# Patient Record
Sex: FEMALE | Race: WHITE | Employment: OTHER | URBAN - METROPOLITAN AREA
[De-identification: names, ages, dates, MRNs, and addresses within clinical notes are randomized per-mention and may not be internally consistent; named-entity substitution may affect disease eponyms.]

---

## 2017-01-01 ENCOUNTER — HOSPITAL ENCOUNTER (INPATIENT)
Age: 82
LOS: 50 days | End: 2017-02-21
Attending: INTERNAL MEDICINE | Admitting: INTERNAL MEDICINE

## 2017-01-01 VITALS
WEIGHT: 125.66 LBS | HEIGHT: 65 IN | DIASTOLIC BLOOD PRESSURE: 46 MMHG | RESPIRATION RATE: 12 BRPM | TEMPERATURE: 96.6 F | HEART RATE: 80 BPM | SYSTOLIC BLOOD PRESSURE: 90 MMHG | BODY MASS INDEX: 20.94 KG/M2

## 2017-01-01 DIAGNOSIS — M79.2 NEURALGIA AND NEURITIS: ICD-10-CM

## 2017-01-01 DIAGNOSIS — M48.061 SPINAL STENOSIS OF LUMBAR REGION: ICD-10-CM

## 2017-01-01 RX ORDER — MORPHINE SULFATE 100 MG/5ML
5 SOLUTION ORAL
Status: DISCONTINUED | OUTPATIENT
Start: 2017-01-01 | End: 2017-01-01

## 2017-01-01 RX ORDER — ACETAMINOPHEN 325 MG/1
650 TABLET ORAL
Status: DISCONTINUED | OUTPATIENT
Start: 2017-01-01 | End: 2017-01-01

## 2017-01-01 RX ORDER — CYCLOBENZAPRINE HCL 5 MG
5 TABLET ORAL
COMMUNITY

## 2017-01-01 RX ORDER — FENTANYL 100 UG/H
2 PATCH TRANSDERMAL
Status: DISCONTINUED | OUTPATIENT
Start: 2017-01-01 | End: 2017-01-01

## 2017-01-01 RX ORDER — LORAZEPAM 1 MG/1
1 TABLET ORAL
Status: DISCONTINUED | OUTPATIENT
Start: 2017-01-01 | End: 2017-01-01

## 2017-01-01 RX ORDER — DIPHENHYDRAMINE HCL 25 MG
25 CAPSULE ORAL
Status: DISCONTINUED | OUTPATIENT
Start: 2017-01-01 | End: 2017-01-01

## 2017-01-01 RX ORDER — FACIAL-BODY WIPES
10 EACH TOPICAL AS NEEDED
Status: DISCONTINUED | OUTPATIENT
Start: 2017-01-01 | End: 2017-01-01 | Stop reason: HOSPADM

## 2017-01-01 RX ORDER — SENNOSIDES 8.6 MG/1
1 TABLET ORAL 2 TIMES DAILY
Status: DISCONTINUED | OUTPATIENT
Start: 2017-01-01 | End: 2017-01-01

## 2017-01-01 RX ORDER — DIPHENHYDRAMINE HYDROCHLORIDE 50 MG/ML
25 INJECTION, SOLUTION INTRAMUSCULAR; INTRAVENOUS
Status: DISCONTINUED | OUTPATIENT
Start: 2017-01-01 | End: 2017-01-01 | Stop reason: HOSPADM

## 2017-01-01 RX ORDER — ACETAMINOPHEN 650 MG/1
650 SUPPOSITORY RECTAL
Status: DISCONTINUED | OUTPATIENT
Start: 2017-01-01 | End: 2017-01-01 | Stop reason: HOSPADM

## 2017-01-01 RX ORDER — TRAMADOL HYDROCHLORIDE 50 MG/1
50 TABLET ORAL
COMMUNITY

## 2017-01-01 RX ORDER — FENTANYL 25 UG/1
1 PATCH TRANSDERMAL
Status: DISCONTINUED | OUTPATIENT
Start: 2017-01-01 | End: 2017-01-01

## 2017-01-01 RX ORDER — FENTANYL 100 UG/H
2 PATCH TRANSDERMAL
Status: DISCONTINUED | OUTPATIENT
Start: 2017-01-01 | End: 2017-01-01 | Stop reason: HOSPADM

## 2017-01-01 RX ORDER — LORAZEPAM 2 MG/ML
1 INJECTION INTRAMUSCULAR
Status: DISCONTINUED | OUTPATIENT
Start: 2017-01-01 | End: 2017-01-01

## 2017-01-01 RX ORDER — IPRATROPIUM BROMIDE AND ALBUTEROL SULFATE 2.5; .5 MG/3ML; MG/3ML
3 SOLUTION RESPIRATORY (INHALATION)
Status: DISCONTINUED | OUTPATIENT
Start: 2017-01-01 | End: 2017-01-01 | Stop reason: HOSPADM

## 2017-01-01 RX ORDER — MORPHINE SULFATE 2 MG/ML
2 INJECTION, SOLUTION INTRAMUSCULAR; INTRAVENOUS
Status: DISCONTINUED | OUTPATIENT
Start: 2017-01-01 | End: 2017-01-01

## 2017-01-01 RX ORDER — HALOPERIDOL 1 MG/1
2 TABLET ORAL
Status: DISCONTINUED | OUTPATIENT
Start: 2017-01-01 | End: 2017-01-01

## 2017-01-01 RX ORDER — AMLODIPINE BESYLATE 5 MG/1
5 TABLET ORAL DAILY
COMMUNITY

## 2017-01-01 RX ORDER — FENTANYL 12.5 UG/1
1 PATCH TRANSDERMAL
Status: DISCONTINUED | OUTPATIENT
Start: 2017-01-01 | End: 2017-01-01

## 2017-01-01 RX ORDER — HALOPERIDOL 5 MG/ML
2 INJECTION INTRAMUSCULAR
Status: DISCONTINUED | OUTPATIENT
Start: 2017-01-01 | End: 2017-01-01 | Stop reason: HOSPADM

## 2017-01-01 RX ORDER — GLYCOPYRROLATE 0.2 MG/ML
0.2 INJECTION INTRAMUSCULAR; INTRAVENOUS
Status: DISCONTINUED | OUTPATIENT
Start: 2017-01-01 | End: 2017-01-01 | Stop reason: HOSPADM

## 2017-01-01 RX ORDER — MORPHINE SULFATE 10 MG/ML
5 INJECTION, SOLUTION INTRAMUSCULAR; INTRAVENOUS
Status: DISCONTINUED | OUTPATIENT
Start: 2017-01-01 | End: 2017-01-01

## 2017-01-01 RX ORDER — ALBUTEROL SULFATE 0.83 MG/ML
2.5 SOLUTION RESPIRATORY (INHALATION)
COMMUNITY

## 2017-01-01 RX ORDER — FENTANYL 75 UG/H
1 PATCH TRANSDERMAL
Status: DISCONTINUED | OUTPATIENT
Start: 2017-01-01 | End: 2017-01-01

## 2017-01-01 RX ORDER — FENTANYL 50 UG/1
1 PATCH TRANSDERMAL
Status: DISCONTINUED | OUTPATIENT
Start: 2017-01-01 | End: 2017-01-01

## 2017-01-01 RX ORDER — SENNOSIDES 8.6 MG/1
2 TABLET ORAL
COMMUNITY

## 2017-01-01 RX ORDER — MORPHINE SULFATE 4 MG/ML
INJECTION, SOLUTION INTRAMUSCULAR; INTRAVENOUS
Status: COMPLETED
Start: 2017-01-01 | End: 2017-01-01

## 2017-01-01 RX ORDER — MORPHINE SULFATE 4 MG/ML
4 INJECTION, SOLUTION INTRAMUSCULAR; INTRAVENOUS
Status: DISCONTINUED | OUTPATIENT
Start: 2017-01-01 | End: 2017-01-01

## 2017-01-01 RX ORDER — FENTANYL 100 UG/H
1 PATCH TRANSDERMAL
Status: DISCONTINUED | OUTPATIENT
Start: 2017-01-01 | End: 2017-01-01

## 2017-01-01 RX ORDER — BENZONATATE 100 MG/1
100 CAPSULE ORAL
COMMUNITY

## 2017-01-01 RX ORDER — FENTANYL 50 UG/1
1 PATCH TRANSDERMAL
Status: DISCONTINUED | OUTPATIENT
Start: 2017-01-01 | End: 2017-01-01 | Stop reason: HOSPADM

## 2017-01-01 RX ORDER — LORAZEPAM 2 MG/ML
1 INJECTION INTRAMUSCULAR
Status: DISCONTINUED | OUTPATIENT
Start: 2017-01-01 | End: 2017-01-01 | Stop reason: HOSPADM

## 2017-01-01 RX ORDER — MORPHINE SULFATE 4 MG/ML
8 INJECTION, SOLUTION INTRAMUSCULAR; INTRAVENOUS
Status: DISCONTINUED | OUTPATIENT
Start: 2017-01-01 | End: 2017-01-01 | Stop reason: HOSPADM

## 2017-01-01 RX ORDER — LORAZEPAM 1 MG/1
1 TABLET ORAL
Status: DISCONTINUED | OUTPATIENT
Start: 2017-01-01 | End: 2017-01-01 | Stop reason: HOSPADM

## 2017-01-01 RX ADMIN — HALOPERIDOL LACTATE 2 MG: 5 INJECTION, SOLUTION INTRAMUSCULAR at 22:53

## 2017-01-01 RX ADMIN — MORPHINE SULFATE 4 MG: 4 INJECTION, SOLUTION INTRAMUSCULAR; INTRAVENOUS at 07:50

## 2017-01-01 RX ADMIN — MORPHINE SULFATE 4 MG: 4 INJECTION, SOLUTION INTRAMUSCULAR; INTRAVENOUS at 22:28

## 2017-01-01 RX ADMIN — MORPHINE SULFATE 5 MG: 100 SOLUTION ORAL at 05:00

## 2017-01-01 RX ADMIN — HALOPERIDOL LACTATE 2 MG: 5 INJECTION, SOLUTION INTRAMUSCULAR at 18:28

## 2017-01-01 RX ADMIN — HALOPERIDOL LACTATE 2 MG: 5 INJECTION, SOLUTION INTRAMUSCULAR at 22:39

## 2017-01-01 RX ADMIN — MORPHINE SULFATE 4 MG: 4 INJECTION, SOLUTION INTRAMUSCULAR; INTRAVENOUS at 01:08

## 2017-01-01 RX ADMIN — HALOPERIDOL LACTATE 2 MG: 5 INJECTION, SOLUTION INTRAMUSCULAR at 17:43

## 2017-01-01 RX ADMIN — LORAZEPAM 1 MG: 2 INJECTION INTRAMUSCULAR; INTRAVENOUS at 03:06

## 2017-01-01 RX ADMIN — MORPHINE SULFATE 8 MG: 4 INJECTION, SOLUTION INTRAMUSCULAR; INTRAVENOUS at 20:56

## 2017-01-01 RX ADMIN — HALOPERIDOL LACTATE 2 MG: 5 INJECTION, SOLUTION INTRAMUSCULAR at 14:03

## 2017-01-01 RX ADMIN — MORPHINE SULFATE 2 MG: 2 INJECTION, SOLUTION INTRAMUSCULAR; INTRAVENOUS at 04:32

## 2017-01-01 RX ADMIN — MORPHINE SULFATE 4 MG: 4 INJECTION, SOLUTION INTRAMUSCULAR; INTRAVENOUS at 09:38

## 2017-01-01 RX ADMIN — MORPHINE SULFATE 4 MG: 4 INJECTION, SOLUTION INTRAMUSCULAR; INTRAVENOUS at 21:42

## 2017-01-01 RX ADMIN — MORPHINE SULFATE 2 MG: 2 INJECTION, SOLUTION INTRAMUSCULAR; INTRAVENOUS at 10:44

## 2017-01-01 RX ADMIN — MORPHINE SULFATE 4 MG: 4 INJECTION, SOLUTION INTRAMUSCULAR; INTRAVENOUS at 16:45

## 2017-01-01 RX ADMIN — MORPHINE SULFATE 8 MG: 4 INJECTION, SOLUTION INTRAMUSCULAR; INTRAVENOUS at 12:54

## 2017-01-01 RX ADMIN — MORPHINE SULFATE 8 MG: 4 INJECTION, SOLUTION INTRAMUSCULAR; INTRAVENOUS at 08:37

## 2017-01-01 RX ADMIN — MORPHINE SULFATE 4 MG: 4 INJECTION, SOLUTION INTRAMUSCULAR; INTRAVENOUS at 15:16

## 2017-01-01 RX ADMIN — MORPHINE SULFATE 4 MG: 4 INJECTION, SOLUTION INTRAMUSCULAR; INTRAVENOUS at 08:51

## 2017-01-01 RX ADMIN — MORPHINE SULFATE 8 MG: 4 INJECTION, SOLUTION INTRAMUSCULAR; INTRAVENOUS at 17:32

## 2017-01-01 RX ADMIN — MORPHINE SULFATE 4 MG: 4 INJECTION, SOLUTION INTRAMUSCULAR; INTRAVENOUS at 22:25

## 2017-01-01 RX ADMIN — MORPHINE SULFATE 2 MG: 2 INJECTION, SOLUTION INTRAMUSCULAR; INTRAVENOUS at 14:03

## 2017-01-01 RX ADMIN — MORPHINE SULFATE 2 MG: 2 INJECTION, SOLUTION INTRAMUSCULAR; INTRAVENOUS at 03:31

## 2017-01-01 RX ADMIN — LORAZEPAM 1 MG: 1 TABLET ORAL at 22:28

## 2017-01-01 RX ADMIN — HALOPERIDOL LACTATE 2 MG: 5 INJECTION, SOLUTION INTRAMUSCULAR at 10:41

## 2017-01-01 RX ADMIN — HALOPERIDOL LACTATE 2 MG: 5 INJECTION, SOLUTION INTRAMUSCULAR at 14:23

## 2017-01-01 RX ADMIN — MORPHINE SULFATE 4 MG: 4 INJECTION, SOLUTION INTRAMUSCULAR; INTRAVENOUS at 19:14

## 2017-01-01 RX ADMIN — MORPHINE SULFATE 4 MG: 4 INJECTION, SOLUTION INTRAMUSCULAR; INTRAVENOUS at 10:04

## 2017-01-01 RX ADMIN — HALOPERIDOL LACTATE 2 MG: 5 INJECTION, SOLUTION INTRAMUSCULAR at 06:58

## 2017-01-01 RX ADMIN — MORPHINE SULFATE 8 MG: 4 INJECTION, SOLUTION INTRAMUSCULAR; INTRAVENOUS at 09:53

## 2017-01-01 RX ADMIN — MORPHINE SULFATE 2 MG: 2 INJECTION, SOLUTION INTRAMUSCULAR; INTRAVENOUS at 08:09

## 2017-01-01 RX ADMIN — LORAZEPAM 1 MG: 2 INJECTION INTRAMUSCULAR; INTRAVENOUS at 23:27

## 2017-01-01 RX ADMIN — MORPHINE SULFATE 8 MG: 4 INJECTION, SOLUTION INTRAMUSCULAR; INTRAVENOUS at 21:09

## 2017-01-01 RX ADMIN — HALOPERIDOL LACTATE 2 MG: 5 INJECTION, SOLUTION INTRAMUSCULAR at 11:42

## 2017-01-01 RX ADMIN — HALOPERIDOL LACTATE 2 MG: 5 INJECTION, SOLUTION INTRAMUSCULAR at 14:16

## 2017-01-01 RX ADMIN — MORPHINE SULFATE 2 MG: 2 INJECTION, SOLUTION INTRAMUSCULAR; INTRAVENOUS at 05:06

## 2017-01-01 RX ADMIN — MORPHINE SULFATE 8 MG: 4 INJECTION, SOLUTION INTRAMUSCULAR; INTRAVENOUS at 18:15

## 2017-01-01 RX ADMIN — MORPHINE SULFATE 8 MG: 4 INJECTION, SOLUTION INTRAMUSCULAR; INTRAVENOUS at 16:34

## 2017-01-01 RX ADMIN — LORAZEPAM 1 MG: 2 INJECTION INTRAMUSCULAR; INTRAVENOUS at 00:33

## 2017-01-01 RX ADMIN — MORPHINE SULFATE 4 MG: 4 INJECTION, SOLUTION INTRAMUSCULAR; INTRAVENOUS at 18:08

## 2017-01-01 RX ADMIN — MORPHINE SULFATE 2 MG: 2 INJECTION, SOLUTION INTRAMUSCULAR; INTRAVENOUS at 11:35

## 2017-01-01 RX ADMIN — MORPHINE SULFATE 2 MG: 2 INJECTION, SOLUTION INTRAMUSCULAR; INTRAVENOUS at 17:03

## 2017-01-01 RX ADMIN — MORPHINE SULFATE 4 MG: 4 INJECTION, SOLUTION INTRAMUSCULAR; INTRAVENOUS at 04:38

## 2017-01-01 RX ADMIN — HALOPERIDOL LACTATE 2 MG: 5 INJECTION, SOLUTION INTRAMUSCULAR at 00:25

## 2017-01-01 RX ADMIN — MORPHINE SULFATE 4 MG: 4 INJECTION, SOLUTION INTRAMUSCULAR; INTRAVENOUS at 08:24

## 2017-01-01 RX ADMIN — MORPHINE SULFATE 4 MG: 4 INJECTION, SOLUTION INTRAMUSCULAR; INTRAVENOUS at 12:19

## 2017-01-01 RX ADMIN — MORPHINE SULFATE 2 MG: 2 INJECTION, SOLUTION INTRAMUSCULAR; INTRAVENOUS at 20:01

## 2017-01-01 RX ADMIN — LORAZEPAM 1 MG: 2 INJECTION INTRAMUSCULAR; INTRAVENOUS at 00:12

## 2017-01-01 RX ADMIN — HALOPERIDOL LACTATE 2 MG: 5 INJECTION, SOLUTION INTRAMUSCULAR at 11:48

## 2017-01-01 RX ADMIN — MORPHINE SULFATE 8 MG: 4 INJECTION, SOLUTION INTRAMUSCULAR; INTRAVENOUS at 16:02

## 2017-01-01 RX ADMIN — MORPHINE SULFATE 4 MG: 4 INJECTION, SOLUTION INTRAMUSCULAR; INTRAVENOUS at 15:25

## 2017-01-01 RX ADMIN — LORAZEPAM 1 MG: 1 TABLET ORAL at 06:31

## 2017-01-01 RX ADMIN — MORPHINE SULFATE 4 MG: 4 INJECTION, SOLUTION INTRAMUSCULAR; INTRAVENOUS at 20:58

## 2017-01-01 RX ADMIN — MORPHINE SULFATE 2 MG: 2 INJECTION, SOLUTION INTRAMUSCULAR; INTRAVENOUS at 05:01

## 2017-01-01 RX ADMIN — BISACODYL 10 MG: 10 SUPPOSITORY RECTAL at 10:05

## 2017-01-01 RX ADMIN — HALOPERIDOL LACTATE 2 MG: 5 INJECTION, SOLUTION INTRAMUSCULAR at 08:10

## 2017-01-01 RX ADMIN — HALOPERIDOL LACTATE 2 MG: 5 INJECTION, SOLUTION INTRAMUSCULAR at 17:54

## 2017-01-01 RX ADMIN — HALOPERIDOL LACTATE 2 MG: 5 INJECTION, SOLUTION INTRAMUSCULAR at 09:37

## 2017-01-01 RX ADMIN — MORPHINE SULFATE 4 MG: 4 INJECTION, SOLUTION INTRAMUSCULAR; INTRAVENOUS at 16:31

## 2017-01-01 RX ADMIN — MORPHINE SULFATE 4 MG: 4 INJECTION, SOLUTION INTRAMUSCULAR; INTRAVENOUS at 11:24

## 2017-01-01 RX ADMIN — MORPHINE SULFATE 4 MG: 4 INJECTION, SOLUTION INTRAMUSCULAR; INTRAVENOUS at 14:13

## 2017-01-01 RX ADMIN — MORPHINE SULFATE 4 MG: 4 INJECTION, SOLUTION INTRAMUSCULAR; INTRAVENOUS at 09:22

## 2017-01-01 RX ADMIN — MORPHINE SULFATE 4 MG: 4 INJECTION, SOLUTION INTRAMUSCULAR; INTRAVENOUS at 22:17

## 2017-01-01 RX ADMIN — MORPHINE SULFATE 8 MG: 4 INJECTION, SOLUTION INTRAMUSCULAR; INTRAVENOUS at 16:10

## 2017-01-01 RX ADMIN — HALOPERIDOL LACTATE 2 MG: 5 INJECTION, SOLUTION INTRAMUSCULAR at 21:42

## 2017-01-01 RX ADMIN — HALOPERIDOL LACTATE 2 MG: 5 INJECTION, SOLUTION INTRAMUSCULAR at 19:41

## 2017-01-01 RX ADMIN — MORPHINE SULFATE 4 MG: 4 INJECTION, SOLUTION INTRAMUSCULAR; INTRAVENOUS at 06:17

## 2017-01-01 RX ADMIN — HALOPERIDOL LACTATE 2 MG: 5 INJECTION, SOLUTION INTRAMUSCULAR at 09:17

## 2017-01-01 RX ADMIN — MORPHINE SULFATE 2 MG: 2 INJECTION, SOLUTION INTRAMUSCULAR; INTRAVENOUS at 19:25

## 2017-01-01 RX ADMIN — MORPHINE SULFATE 2 MG: 2 INJECTION, SOLUTION INTRAMUSCULAR; INTRAVENOUS at 08:51

## 2017-01-01 RX ADMIN — HALOPERIDOL LACTATE 2 MG: 5 INJECTION, SOLUTION INTRAMUSCULAR at 22:09

## 2017-01-01 RX ADMIN — MORPHINE SULFATE 2 MG: 2 INJECTION, SOLUTION INTRAMUSCULAR; INTRAVENOUS at 11:45

## 2017-01-01 RX ADMIN — MORPHINE SULFATE 4 MG: 4 INJECTION, SOLUTION INTRAMUSCULAR; INTRAVENOUS at 17:13

## 2017-01-01 RX ADMIN — MORPHINE SULFATE 2 MG: 2 INJECTION, SOLUTION INTRAMUSCULAR; INTRAVENOUS at 22:15

## 2017-01-01 RX ADMIN — MORPHINE SULFATE 4 MG: 4 INJECTION, SOLUTION INTRAMUSCULAR; INTRAVENOUS at 07:17

## 2017-01-01 RX ADMIN — MORPHINE SULFATE 8 MG: 4 INJECTION, SOLUTION INTRAMUSCULAR; INTRAVENOUS at 19:40

## 2017-01-01 RX ADMIN — MORPHINE SULFATE 4 MG: 4 INJECTION, SOLUTION INTRAMUSCULAR; INTRAVENOUS at 16:42

## 2017-01-01 RX ADMIN — MORPHINE SULFATE 4 MG: 4 INJECTION, SOLUTION INTRAMUSCULAR; INTRAVENOUS at 17:37

## 2017-01-01 RX ADMIN — MORPHINE SULFATE 2 MG: 2 INJECTION, SOLUTION INTRAMUSCULAR; INTRAVENOUS at 12:02

## 2017-01-01 RX ADMIN — LORAZEPAM 1 MG: 1 TABLET ORAL at 08:47

## 2017-01-01 RX ADMIN — MORPHINE SULFATE 4 MG: 4 INJECTION, SOLUTION INTRAMUSCULAR; INTRAVENOUS at 17:54

## 2017-01-01 RX ADMIN — HALOPERIDOL LACTATE 2 MG: 5 INJECTION, SOLUTION INTRAMUSCULAR at 08:24

## 2017-01-01 RX ADMIN — MORPHINE SULFATE 8 MG: 4 INJECTION, SOLUTION INTRAMUSCULAR; INTRAVENOUS at 06:07

## 2017-01-01 RX ADMIN — MORPHINE SULFATE 8 MG: 4 INJECTION, SOLUTION INTRAMUSCULAR; INTRAVENOUS at 07:32

## 2017-01-01 RX ADMIN — HALOPERIDOL LACTATE 2 MG: 5 INJECTION, SOLUTION INTRAMUSCULAR at 01:44

## 2017-01-01 RX ADMIN — MORPHINE SULFATE 2 MG: 2 INJECTION, SOLUTION INTRAMUSCULAR; INTRAVENOUS at 15:56

## 2017-01-01 RX ADMIN — MORPHINE SULFATE 4 MG: 4 INJECTION, SOLUTION INTRAMUSCULAR; INTRAVENOUS at 00:11

## 2017-01-01 RX ADMIN — HALOPERIDOL LACTATE 2 MG: 5 INJECTION, SOLUTION INTRAMUSCULAR at 22:17

## 2017-01-01 RX ADMIN — MORPHINE SULFATE 4 MG: 4 INJECTION, SOLUTION INTRAMUSCULAR; INTRAVENOUS at 22:53

## 2017-01-01 RX ADMIN — HALOPERIDOL LACTATE 2 MG: 5 INJECTION, SOLUTION INTRAMUSCULAR at 17:13

## 2017-01-01 RX ADMIN — HALOPERIDOL LACTATE 2 MG: 5 INJECTION, SOLUTION INTRAMUSCULAR at 12:50

## 2017-01-01 RX ADMIN — MORPHINE SULFATE 4 MG: 4 INJECTION, SOLUTION INTRAMUSCULAR; INTRAVENOUS at 20:39

## 2017-01-01 RX ADMIN — HALOPERIDOL LACTATE 2 MG: 5 INJECTION, SOLUTION INTRAMUSCULAR at 01:19

## 2017-01-01 RX ADMIN — MORPHINE SULFATE 4 MG: 4 INJECTION, SOLUTION INTRAMUSCULAR; INTRAVENOUS at 03:50

## 2017-01-01 RX ADMIN — MORPHINE SULFATE 8 MG: 4 INJECTION, SOLUTION INTRAMUSCULAR; INTRAVENOUS at 16:26

## 2017-01-01 RX ADMIN — MORPHINE SULFATE 2 MG: 2 INJECTION, SOLUTION INTRAMUSCULAR; INTRAVENOUS at 23:24

## 2017-01-01 RX ADMIN — MORPHINE SULFATE 4 MG: 4 INJECTION, SOLUTION INTRAMUSCULAR; INTRAVENOUS at 18:28

## 2017-01-01 RX ADMIN — HALOPERIDOL LACTATE 2 MG: 5 INJECTION, SOLUTION INTRAMUSCULAR at 10:22

## 2017-01-01 RX ADMIN — MORPHINE SULFATE 2 MG: 2 INJECTION, SOLUTION INTRAMUSCULAR; INTRAVENOUS at 13:40

## 2017-01-01 RX ADMIN — HALOPERIDOL LACTATE 2 MG: 5 INJECTION, SOLUTION INTRAMUSCULAR at 15:20

## 2017-01-01 RX ADMIN — MORPHINE SULFATE 2 MG: 2 INJECTION, SOLUTION INTRAMUSCULAR; INTRAVENOUS at 09:17

## 2017-01-01 RX ADMIN — HALOPERIDOL LACTATE 2 MG: 5 INJECTION, SOLUTION INTRAMUSCULAR at 04:01

## 2017-01-01 RX ADMIN — HALOPERIDOL LACTATE 2 MG: 5 INJECTION, SOLUTION INTRAMUSCULAR at 17:15

## 2017-01-01 RX ADMIN — MORPHINE SULFATE 2 MG: 2 INJECTION, SOLUTION INTRAMUSCULAR; INTRAVENOUS at 04:20

## 2017-01-01 RX ADMIN — HALOPERIDOL LACTATE 2 MG: 5 INJECTION, SOLUTION INTRAMUSCULAR at 05:01

## 2017-01-01 RX ADMIN — HALOPERIDOL LACTATE 2 MG: 5 INJECTION, SOLUTION INTRAMUSCULAR at 11:34

## 2017-01-01 RX ADMIN — MORPHINE SULFATE 8 MG: 4 INJECTION, SOLUTION INTRAMUSCULAR; INTRAVENOUS at 01:00

## 2017-01-01 RX ADMIN — HALOPERIDOL LACTATE 2 MG: 5 INJECTION, SOLUTION INTRAMUSCULAR at 12:15

## 2017-01-01 RX ADMIN — MORPHINE SULFATE 4 MG: 4 INJECTION, SOLUTION INTRAMUSCULAR; INTRAVENOUS at 09:18

## 2017-01-01 RX ADMIN — MORPHINE SULFATE 2 MG: 2 INJECTION, SOLUTION INTRAMUSCULAR; INTRAVENOUS at 22:02

## 2017-01-01 RX ADMIN — HALOPERIDOL LACTATE 2 MG: 5 INJECTION, SOLUTION INTRAMUSCULAR at 19:08

## 2017-01-01 RX ADMIN — MORPHINE SULFATE 2 MG: 2 INJECTION, SOLUTION INTRAMUSCULAR; INTRAVENOUS at 10:35

## 2017-01-01 RX ADMIN — MORPHINE SULFATE 4 MG: 4 INJECTION, SOLUTION INTRAMUSCULAR; INTRAVENOUS at 04:21

## 2017-01-01 RX ADMIN — MORPHINE SULFATE 4 MG: 4 INJECTION, SOLUTION INTRAMUSCULAR; INTRAVENOUS at 02:11

## 2017-01-01 RX ADMIN — MORPHINE SULFATE 4 MG: 4 INJECTION, SOLUTION INTRAMUSCULAR; INTRAVENOUS at 22:19

## 2017-01-01 RX ADMIN — MORPHINE SULFATE 8 MG: 4 INJECTION, SOLUTION INTRAMUSCULAR; INTRAVENOUS at 12:50

## 2017-01-01 RX ADMIN — MORPHINE SULFATE 4 MG: 4 INJECTION, SOLUTION INTRAMUSCULAR; INTRAVENOUS at 15:18

## 2017-01-01 RX ADMIN — MORPHINE SULFATE 2 MG: 2 INJECTION, SOLUTION INTRAMUSCULAR; INTRAVENOUS at 02:15

## 2017-01-01 RX ADMIN — HALOPERIDOL LACTATE 2 MG: 5 INJECTION, SOLUTION INTRAMUSCULAR at 20:56

## 2017-01-01 RX ADMIN — MORPHINE SULFATE 8 MG: 4 INJECTION, SOLUTION INTRAMUSCULAR; INTRAVENOUS at 15:39

## 2017-01-01 RX ADMIN — MORPHINE SULFATE 8 MG: 4 INJECTION, SOLUTION INTRAMUSCULAR; INTRAVENOUS at 11:28

## 2017-01-01 RX ADMIN — MORPHINE SULFATE 4 MG: 4 INJECTION, SOLUTION INTRAMUSCULAR; INTRAVENOUS at 06:02

## 2017-01-01 RX ADMIN — MORPHINE SULFATE 4 MG: 4 INJECTION, SOLUTION INTRAMUSCULAR; INTRAVENOUS at 12:50

## 2017-01-01 RX ADMIN — MORPHINE SULFATE 2 MG: 2 INJECTION, SOLUTION INTRAMUSCULAR; INTRAVENOUS at 20:12

## 2017-01-01 RX ADMIN — LORAZEPAM 1 MG: 2 INJECTION INTRAMUSCULAR; INTRAVENOUS at 08:36

## 2017-01-01 RX ADMIN — HALOPERIDOL LACTATE 2 MG: 5 INJECTION, SOLUTION INTRAMUSCULAR at 18:11

## 2017-01-01 RX ADMIN — MORPHINE SULFATE 4 MG: 4 INJECTION, SOLUTION INTRAMUSCULAR; INTRAVENOUS at 19:48

## 2017-01-01 RX ADMIN — MORPHINE SULFATE 4 MG: 4 INJECTION, SOLUTION INTRAMUSCULAR; INTRAVENOUS at 02:39

## 2017-01-01 RX ADMIN — HALOPERIDOL LACTATE 2 MG: 5 INJECTION, SOLUTION INTRAMUSCULAR at 23:02

## 2017-01-01 RX ADMIN — MORPHINE SULFATE 2 MG: 2 INJECTION, SOLUTION INTRAMUSCULAR; INTRAVENOUS at 11:24

## 2017-01-01 RX ADMIN — MORPHINE SULFATE 4 MG: 4 INJECTION, SOLUTION INTRAMUSCULAR; INTRAVENOUS at 10:32

## 2017-01-01 RX ADMIN — MORPHINE SULFATE 8 MG: 4 INJECTION, SOLUTION INTRAMUSCULAR; INTRAVENOUS at 08:22

## 2017-01-01 RX ADMIN — MORPHINE SULFATE 4 MG: 4 INJECTION, SOLUTION INTRAMUSCULAR; INTRAVENOUS at 10:09

## 2017-01-01 RX ADMIN — MORPHINE SULFATE 4 MG: 4 INJECTION, SOLUTION INTRAMUSCULAR; INTRAVENOUS at 07:48

## 2017-01-01 RX ADMIN — MORPHINE SULFATE 4 MG: 4 INJECTION, SOLUTION INTRAMUSCULAR; INTRAVENOUS at 14:23

## 2017-01-01 RX ADMIN — MORPHINE SULFATE 8 MG: 4 INJECTION, SOLUTION INTRAMUSCULAR; INTRAVENOUS at 17:41

## 2017-01-01 RX ADMIN — HALOPERIDOL LACTATE 2 MG: 5 INJECTION, SOLUTION INTRAMUSCULAR at 00:37

## 2017-01-01 RX ADMIN — MORPHINE SULFATE 4 MG: 4 INJECTION, SOLUTION INTRAMUSCULAR; INTRAVENOUS at 14:34

## 2017-01-01 RX ADMIN — MORPHINE SULFATE 4 MG: 4 INJECTION, SOLUTION INTRAMUSCULAR; INTRAVENOUS at 03:45

## 2017-01-01 RX ADMIN — MORPHINE SULFATE 8 MG: 4 INJECTION, SOLUTION INTRAMUSCULAR; INTRAVENOUS at 15:05

## 2017-01-01 RX ADMIN — MORPHINE SULFATE 8 MG: 4 INJECTION, SOLUTION INTRAMUSCULAR; INTRAVENOUS at 05:48

## 2017-01-01 RX ADMIN — HALOPERIDOL LACTATE 2 MG: 5 INJECTION, SOLUTION INTRAMUSCULAR at 22:24

## 2017-01-01 RX ADMIN — MORPHINE SULFATE 4 MG: 4 INJECTION, SOLUTION INTRAMUSCULAR; INTRAVENOUS at 00:29

## 2017-01-01 RX ADMIN — SENNOSIDES 8.6 MG: 8.6 TABLET, FILM COATED ORAL at 09:18

## 2017-01-01 RX ADMIN — MORPHINE SULFATE 4 MG: 4 INJECTION, SOLUTION INTRAMUSCULAR; INTRAVENOUS at 15:51

## 2017-01-01 RX ADMIN — MORPHINE SULFATE 8 MG: 4 INJECTION, SOLUTION INTRAMUSCULAR; INTRAVENOUS at 09:59

## 2017-01-01 RX ADMIN — MORPHINE SULFATE 2 MG: 2 INJECTION, SOLUTION INTRAMUSCULAR; INTRAVENOUS at 10:33

## 2017-01-01 RX ADMIN — MORPHINE SULFATE 4 MG: 4 INJECTION, SOLUTION INTRAMUSCULAR; INTRAVENOUS at 04:33

## 2017-01-01 RX ADMIN — MORPHINE SULFATE 4 MG: 4 INJECTION, SOLUTION INTRAMUSCULAR; INTRAVENOUS at 03:44

## 2017-01-01 RX ADMIN — MORPHINE SULFATE 2 MG: 2 INJECTION, SOLUTION INTRAMUSCULAR; INTRAVENOUS at 16:26

## 2017-01-01 RX ADMIN — MORPHINE SULFATE 2 MG: 2 INJECTION, SOLUTION INTRAMUSCULAR; INTRAVENOUS at 01:14

## 2017-01-01 RX ADMIN — MORPHINE SULFATE 4 MG: 4 INJECTION, SOLUTION INTRAMUSCULAR; INTRAVENOUS at 12:17

## 2017-01-01 RX ADMIN — HALOPERIDOL LACTATE 2 MG: 5 INJECTION, SOLUTION INTRAMUSCULAR at 07:16

## 2017-01-01 RX ADMIN — MORPHINE SULFATE 2 MG: 2 INJECTION, SOLUTION INTRAMUSCULAR; INTRAVENOUS at 23:43

## 2017-01-01 RX ADMIN — MORPHINE SULFATE 8 MG: 4 INJECTION, SOLUTION INTRAMUSCULAR; INTRAVENOUS at 06:05

## 2017-01-01 RX ADMIN — HALOPERIDOL LACTATE 2 MG: 5 INJECTION, SOLUTION INTRAMUSCULAR at 14:50

## 2017-01-01 RX ADMIN — HALOPERIDOL LACTATE 2 MG: 5 INJECTION, SOLUTION INTRAMUSCULAR at 14:53

## 2017-01-01 RX ADMIN — HALOPERIDOL LACTATE 2 MG: 5 INJECTION, SOLUTION INTRAMUSCULAR at 11:21

## 2017-01-01 RX ADMIN — LORAZEPAM 1 MG: 2 INJECTION INTRAMUSCULAR; INTRAVENOUS at 12:38

## 2017-01-01 RX ADMIN — MORPHINE SULFATE 8 MG: 4 INJECTION, SOLUTION INTRAMUSCULAR; INTRAVENOUS at 18:29

## 2017-01-01 RX ADMIN — MORPHINE SULFATE 8 MG: 4 INJECTION, SOLUTION INTRAMUSCULAR; INTRAVENOUS at 10:00

## 2017-01-01 RX ADMIN — HALOPERIDOL LACTATE 2 MG: 5 INJECTION, SOLUTION INTRAMUSCULAR at 04:25

## 2017-01-01 RX ADMIN — BISACODYL 10 MG: 10 SUPPOSITORY RECTAL at 10:55

## 2017-01-01 RX ADMIN — HALOPERIDOL LACTATE 2 MG: 5 INJECTION, SOLUTION INTRAMUSCULAR at 14:20

## 2017-01-01 RX ADMIN — HALOPERIDOL LACTATE 2 MG: 5 INJECTION, SOLUTION INTRAMUSCULAR at 04:44

## 2017-01-01 RX ADMIN — MORPHINE SULFATE 4 MG: 4 INJECTION, SOLUTION INTRAMUSCULAR; INTRAVENOUS at 22:23

## 2017-01-01 RX ADMIN — MORPHINE SULFATE 2 MG: 2 INJECTION, SOLUTION INTRAMUSCULAR; INTRAVENOUS at 10:03

## 2017-01-01 RX ADMIN — MORPHINE SULFATE 8 MG: 4 INJECTION, SOLUTION INTRAMUSCULAR; INTRAVENOUS at 07:55

## 2017-01-01 RX ADMIN — LORAZEPAM 1 MG: 1 TABLET ORAL at 20:22

## 2017-01-01 RX ADMIN — LORAZEPAM 1 MG: 1 TABLET ORAL at 02:39

## 2017-01-01 RX ADMIN — MORPHINE SULFATE 8 MG: 4 INJECTION, SOLUTION INTRAMUSCULAR; INTRAVENOUS at 04:20

## 2017-01-01 RX ADMIN — HALOPERIDOL LACTATE 2 MG: 5 INJECTION, SOLUTION INTRAMUSCULAR at 07:49

## 2017-01-01 RX ADMIN — MORPHINE SULFATE 2 MG: 2 INJECTION, SOLUTION INTRAMUSCULAR; INTRAVENOUS at 05:20

## 2017-01-01 RX ADMIN — HALOPERIDOL LACTATE 2 MG: 5 INJECTION, SOLUTION INTRAMUSCULAR at 10:32

## 2017-01-01 RX ADMIN — MORPHINE SULFATE 4 MG: 4 INJECTION, SOLUTION INTRAMUSCULAR; INTRAVENOUS at 15:56

## 2017-01-01 RX ADMIN — HALOPERIDOL LACTATE 2 MG: 5 INJECTION, SOLUTION INTRAMUSCULAR at 10:36

## 2017-01-01 RX ADMIN — MORPHINE SULFATE 2 MG: 2 INJECTION, SOLUTION INTRAMUSCULAR; INTRAVENOUS at 14:24

## 2017-01-01 RX ADMIN — MORPHINE SULFATE 4 MG: 4 INJECTION, SOLUTION INTRAMUSCULAR; INTRAVENOUS at 00:36

## 2017-01-01 RX ADMIN — MORPHINE SULFATE 2 MG: 2 INJECTION, SOLUTION INTRAMUSCULAR; INTRAVENOUS at 12:21

## 2017-01-01 RX ADMIN — MORPHINE SULFATE 8 MG: 4 INJECTION, SOLUTION INTRAMUSCULAR; INTRAVENOUS at 10:20

## 2017-01-01 RX ADMIN — HALOPERIDOL LACTATE 2 MG: 5 INJECTION, SOLUTION INTRAMUSCULAR at 10:44

## 2017-01-01 RX ADMIN — MORPHINE SULFATE 4 MG: 4 INJECTION, SOLUTION INTRAMUSCULAR; INTRAVENOUS at 11:11

## 2017-01-01 RX ADMIN — MORPHINE SULFATE 8 MG: 4 INJECTION, SOLUTION INTRAMUSCULAR; INTRAVENOUS at 04:52

## 2017-01-01 RX ADMIN — MORPHINE SULFATE 8 MG: 4 INJECTION, SOLUTION INTRAMUSCULAR; INTRAVENOUS at 06:57

## 2017-01-01 RX ADMIN — MORPHINE SULFATE 5 MG: 100 SOLUTION ORAL at 07:58

## 2017-01-01 RX ADMIN — MORPHINE SULFATE 4 MG: 4 INJECTION, SOLUTION INTRAMUSCULAR; INTRAVENOUS at 23:41

## 2017-01-01 RX ADMIN — HALOPERIDOL LACTATE 2 MG: 5 INJECTION, SOLUTION INTRAMUSCULAR at 20:06

## 2017-01-01 RX ADMIN — MORPHINE SULFATE 4 MG: 4 INJECTION, SOLUTION INTRAMUSCULAR; INTRAVENOUS at 03:35

## 2017-01-01 RX ADMIN — HALOPERIDOL LACTATE 2 MG: 5 INJECTION, SOLUTION INTRAMUSCULAR at 17:37

## 2017-01-01 RX ADMIN — MORPHINE SULFATE 4 MG: 4 INJECTION, SOLUTION INTRAMUSCULAR; INTRAVENOUS at 20:56

## 2017-01-01 RX ADMIN — MORPHINE SULFATE 4 MG: 4 INJECTION, SOLUTION INTRAMUSCULAR; INTRAVENOUS at 12:14

## 2017-01-01 RX ADMIN — HALOPERIDOL LACTATE 2 MG: 5 INJECTION, SOLUTION INTRAMUSCULAR at 21:20

## 2017-01-01 RX ADMIN — LORAZEPAM 1 MG: 2 INJECTION INTRAMUSCULAR; INTRAVENOUS at 03:45

## 2017-01-01 RX ADMIN — MORPHINE SULFATE 4 MG: 4 INJECTION, SOLUTION INTRAMUSCULAR; INTRAVENOUS at 04:10

## 2017-01-01 RX ADMIN — MORPHINE SULFATE 8 MG: 4 INJECTION, SOLUTION INTRAMUSCULAR; INTRAVENOUS at 15:15

## 2017-01-01 RX ADMIN — MORPHINE SULFATE 8 MG: 4 INJECTION, SOLUTION INTRAMUSCULAR; INTRAVENOUS at 06:18

## 2017-01-01 RX ADMIN — MORPHINE SULFATE 4 MG: 4 INJECTION, SOLUTION INTRAMUSCULAR; INTRAVENOUS at 10:22

## 2017-01-01 RX ADMIN — HALOPERIDOL LACTATE 2 MG: 5 INJECTION, SOLUTION INTRAMUSCULAR at 00:26

## 2017-01-01 RX ADMIN — HALOPERIDOL LACTATE 2 MG: 5 INJECTION, SOLUTION INTRAMUSCULAR at 18:08

## 2017-01-01 RX ADMIN — MORPHINE SULFATE 2 MG: 2 INJECTION, SOLUTION INTRAMUSCULAR; INTRAVENOUS at 18:57

## 2017-01-01 RX ADMIN — MORPHINE SULFATE 2 MG: 2 INJECTION, SOLUTION INTRAMUSCULAR; INTRAVENOUS at 19:03

## 2017-01-01 RX ADMIN — MORPHINE SULFATE 4 MG: 4 INJECTION, SOLUTION INTRAMUSCULAR; INTRAVENOUS at 21:20

## 2017-01-01 RX ADMIN — MORPHINE SULFATE 5 MG: 100 SOLUTION ORAL at 06:31

## 2017-01-01 RX ADMIN — HALOPERIDOL LACTATE 2 MG: 5 INJECTION, SOLUTION INTRAMUSCULAR at 08:33

## 2017-01-01 RX ADMIN — LORAZEPAM 1 MG: 2 INJECTION INTRAMUSCULAR; INTRAVENOUS at 09:50

## 2017-01-01 RX ADMIN — HALOPERIDOL LACTATE 2 MG: 5 INJECTION, SOLUTION INTRAMUSCULAR at 23:11

## 2017-01-01 RX ADMIN — MORPHINE SULFATE 4 MG: 4 INJECTION, SOLUTION INTRAMUSCULAR; INTRAVENOUS at 18:06

## 2017-01-01 RX ADMIN — MORPHINE SULFATE 4 MG: 4 INJECTION, SOLUTION INTRAMUSCULAR; INTRAVENOUS at 09:50

## 2017-01-01 RX ADMIN — HALOPERIDOL LACTATE 2 MG: 5 INJECTION, SOLUTION INTRAMUSCULAR at 11:58

## 2017-01-01 RX ADMIN — MORPHINE SULFATE 2 MG: 2 INJECTION, SOLUTION INTRAMUSCULAR; INTRAVENOUS at 02:21

## 2017-01-01 RX ADMIN — HALOPERIDOL LACTATE 2 MG: 5 INJECTION, SOLUTION INTRAMUSCULAR at 11:03

## 2017-01-01 RX ADMIN — HALOPERIDOL LACTATE 2 MG: 5 INJECTION, SOLUTION INTRAMUSCULAR at 03:44

## 2017-01-01 RX ADMIN — MORPHINE SULFATE 8 MG: 4 INJECTION, SOLUTION INTRAMUSCULAR; INTRAVENOUS at 18:24

## 2017-01-01 RX ADMIN — MORPHINE SULFATE 4 MG: 4 INJECTION, SOLUTION INTRAMUSCULAR; INTRAVENOUS at 23:12

## 2017-01-01 RX ADMIN — HALOPERIDOL LACTATE 2 MG: 5 INJECTION, SOLUTION INTRAMUSCULAR at 09:18

## 2017-01-01 RX ADMIN — MORPHINE SULFATE 8 MG: 4 INJECTION, SOLUTION INTRAMUSCULAR; INTRAVENOUS at 00:24

## 2017-01-01 RX ADMIN — MORPHINE SULFATE 4 MG: 4 INJECTION, SOLUTION INTRAMUSCULAR; INTRAVENOUS at 15:26

## 2017-01-01 RX ADMIN — HALOPERIDOL LACTATE 2 MG: 5 INJECTION, SOLUTION INTRAMUSCULAR at 04:21

## 2017-01-01 RX ADMIN — MORPHINE SULFATE 4 MG: 4 INJECTION, SOLUTION INTRAMUSCULAR; INTRAVENOUS at 04:00

## 2017-01-01 RX ADMIN — MORPHINE SULFATE 4 MG: 4 INJECTION, SOLUTION INTRAMUSCULAR; INTRAVENOUS at 13:50

## 2017-01-01 RX ADMIN — MORPHINE SULFATE 4 MG: 4 INJECTION, SOLUTION INTRAMUSCULAR; INTRAVENOUS at 17:43

## 2017-01-01 RX ADMIN — HALOPERIDOL LACTATE 2 MG: 5 INJECTION, SOLUTION INTRAMUSCULAR at 11:24

## 2017-01-01 RX ADMIN — MORPHINE SULFATE 2 MG: 2 INJECTION, SOLUTION INTRAMUSCULAR; INTRAVENOUS at 02:23

## 2017-01-01 RX ADMIN — MORPHINE SULFATE 4 MG: 4 INJECTION, SOLUTION INTRAMUSCULAR; INTRAVENOUS at 14:16

## 2017-01-01 RX ADMIN — MORPHINE SULFATE 8 MG: 4 INJECTION, SOLUTION INTRAMUSCULAR; INTRAVENOUS at 05:02

## 2017-01-01 RX ADMIN — MORPHINE SULFATE 8 MG: 4 INJECTION, SOLUTION INTRAMUSCULAR; INTRAVENOUS at 02:44

## 2017-01-01 RX ADMIN — HALOPERIDOL LACTATE 2 MG: 5 INJECTION, SOLUTION INTRAMUSCULAR at 20:39

## 2017-01-01 RX ADMIN — MORPHINE SULFATE 4 MG: 4 INJECTION, SOLUTION INTRAMUSCULAR; INTRAVENOUS at 14:53

## 2017-01-01 RX ADMIN — MORPHINE SULFATE 4 MG: 4 INJECTION, SOLUTION INTRAMUSCULAR; INTRAVENOUS at 10:41

## 2017-01-01 RX ADMIN — MORPHINE SULFATE 4 MG: 4 INJECTION, SOLUTION INTRAMUSCULAR; INTRAVENOUS at 13:38

## 2017-01-01 RX ADMIN — MORPHINE SULFATE 2 MG: 2 INJECTION, SOLUTION INTRAMUSCULAR; INTRAVENOUS at 00:14

## 2017-01-01 RX ADMIN — MORPHINE SULFATE 2 MG: 2 INJECTION, SOLUTION INTRAMUSCULAR; INTRAVENOUS at 19:38

## 2017-01-01 RX ADMIN — MORPHINE SULFATE 4 MG: 4 INJECTION, SOLUTION INTRAMUSCULAR; INTRAVENOUS at 23:03

## 2017-01-01 RX ADMIN — LORAZEPAM 1 MG: 1 TABLET ORAL at 04:02

## 2017-01-01 RX ADMIN — HALOPERIDOL LACTATE 2 MG: 5 INJECTION, SOLUTION INTRAMUSCULAR at 09:32

## 2017-01-01 RX ADMIN — MORPHINE SULFATE 4 MG: 4 INJECTION, SOLUTION INTRAMUSCULAR; INTRAVENOUS at 05:56

## 2017-01-01 RX ADMIN — MORPHINE SULFATE 4 MG: 4 INJECTION, SOLUTION INTRAMUSCULAR; INTRAVENOUS at 12:43

## 2017-01-01 RX ADMIN — LORAZEPAM 1 MG: 2 INJECTION INTRAMUSCULAR; INTRAVENOUS at 06:17

## 2017-01-01 RX ADMIN — MORPHINE SULFATE 8 MG: 4 INJECTION, SOLUTION INTRAMUSCULAR; INTRAVENOUS at 10:31

## 2017-01-01 RX ADMIN — MORPHINE SULFATE 2 MG: 2 INJECTION, SOLUTION INTRAMUSCULAR; INTRAVENOUS at 14:38

## 2017-01-01 RX ADMIN — MORPHINE SULFATE 4 MG: 4 INJECTION, SOLUTION INTRAMUSCULAR; INTRAVENOUS at 20:05

## 2017-01-01 RX ADMIN — HALOPERIDOL LACTATE 2 MG: 5 INJECTION, SOLUTION INTRAMUSCULAR at 20:01

## 2017-01-01 RX ADMIN — MORPHINE SULFATE 4 MG: 4 INJECTION, SOLUTION INTRAMUSCULAR; INTRAVENOUS at 10:43

## 2017-01-01 RX ADMIN — MORPHINE SULFATE 8 MG: 4 INJECTION, SOLUTION INTRAMUSCULAR; INTRAVENOUS at 17:19

## 2017-01-01 RX ADMIN — MORPHINE SULFATE 8 MG: 4 INJECTION, SOLUTION INTRAMUSCULAR; INTRAVENOUS at 11:48

## 2017-01-01 RX ADMIN — HALOPERIDOL LACTATE 2 MG: 5 INJECTION, SOLUTION INTRAMUSCULAR at 05:57

## 2017-01-01 RX ADMIN — HALOPERIDOL LACTATE 2 MG: 5 INJECTION, SOLUTION INTRAMUSCULAR at 09:33

## 2017-01-01 RX ADMIN — MORPHINE SULFATE 2 MG: 2 INJECTION, SOLUTION INTRAMUSCULAR; INTRAVENOUS at 11:29

## 2017-01-01 RX ADMIN — MORPHINE SULFATE 2 MG: 2 INJECTION, SOLUTION INTRAMUSCULAR; INTRAVENOUS at 08:47

## 2017-01-01 RX ADMIN — LORAZEPAM 1 MG: 2 INJECTION INTRAMUSCULAR; INTRAVENOUS at 18:06

## 2017-01-01 RX ADMIN — MORPHINE SULFATE 4 MG: 4 INJECTION, SOLUTION INTRAMUSCULAR; INTRAVENOUS at 19:09

## 2017-01-01 RX ADMIN — HALOPERIDOL LACTATE 2 MG: 5 INJECTION, SOLUTION INTRAMUSCULAR at 10:43

## 2017-01-01 RX ADMIN — HALOPERIDOL LACTATE 2 MG: 5 INJECTION, SOLUTION INTRAMUSCULAR at 01:14

## 2017-01-01 RX ADMIN — MORPHINE SULFATE 4 MG: 4 INJECTION, SOLUTION INTRAMUSCULAR; INTRAVENOUS at 11:18

## 2017-01-01 RX ADMIN — HALOPERIDOL LACTATE 2 MG: 5 INJECTION, SOLUTION INTRAMUSCULAR at 02:23

## 2017-01-01 RX ADMIN — HALOPERIDOL LACTATE 2 MG: 5 INJECTION, SOLUTION INTRAMUSCULAR at 15:49

## 2017-01-01 RX ADMIN — MORPHINE SULFATE 4 MG: 4 INJECTION, SOLUTION INTRAMUSCULAR; INTRAVENOUS at 01:44

## 2017-01-01 RX ADMIN — HALOPERIDOL LACTATE 2 MG: 5 INJECTION, SOLUTION INTRAMUSCULAR at 16:23

## 2017-01-01 RX ADMIN — MORPHINE SULFATE 8 MG: 4 INJECTION, SOLUTION INTRAMUSCULAR; INTRAVENOUS at 08:18

## 2017-01-01 RX ADMIN — MORPHINE SULFATE 4 MG: 4 INJECTION, SOLUTION INTRAMUSCULAR; INTRAVENOUS at 20:23

## 2017-01-01 RX ADMIN — HALOPERIDOL LACTATE 2 MG: 5 INJECTION, SOLUTION INTRAMUSCULAR at 21:08

## 2017-01-01 RX ADMIN — MORPHINE SULFATE 2 MG: 2 INJECTION, SOLUTION INTRAMUSCULAR; INTRAVENOUS at 18:10

## 2017-01-01 RX ADMIN — HALOPERIDOL LACTATE 2 MG: 5 INJECTION, SOLUTION INTRAMUSCULAR at 23:43

## 2017-01-01 RX ADMIN — MORPHINE SULFATE 4 MG: 4 INJECTION, SOLUTION INTRAMUSCULAR; INTRAVENOUS at 22:55

## 2017-01-01 RX ADMIN — MORPHINE SULFATE 4 MG: 4 INJECTION, SOLUTION INTRAMUSCULAR; INTRAVENOUS at 15:20

## 2017-01-01 RX ADMIN — MORPHINE SULFATE 4 MG: 4 INJECTION, SOLUTION INTRAMUSCULAR; INTRAVENOUS at 11:51

## 2017-01-01 RX ADMIN — MORPHINE SULFATE 2 MG: 2 INJECTION, SOLUTION INTRAMUSCULAR; INTRAVENOUS at 12:31

## 2017-01-01 RX ADMIN — MORPHINE SULFATE 8 MG: 4 INJECTION, SOLUTION INTRAMUSCULAR; INTRAVENOUS at 22:23

## 2017-01-01 RX ADMIN — MORPHINE SULFATE 2 MG: 2 INJECTION, SOLUTION INTRAMUSCULAR; INTRAVENOUS at 19:41

## 2017-01-01 RX ADMIN — MORPHINE SULFATE 8 MG: 4 INJECTION, SOLUTION INTRAMUSCULAR; INTRAVENOUS at 05:26

## 2017-01-01 RX ADMIN — MORPHINE SULFATE 4 MG: 4 INJECTION, SOLUTION INTRAMUSCULAR; INTRAVENOUS at 17:15

## 2017-01-01 RX ADMIN — MORPHINE SULFATE 2 MG: 2 INJECTION, SOLUTION INTRAMUSCULAR; INTRAVENOUS at 22:09

## 2017-01-01 RX ADMIN — MORPHINE SULFATE 8 MG: 4 INJECTION, SOLUTION INTRAMUSCULAR; INTRAVENOUS at 09:16

## 2017-01-01 RX ADMIN — GLYCOPYRROLATE 0.2 MG: 0.2 INJECTION INTRAMUSCULAR; INTRAVENOUS at 15:29

## 2017-01-01 RX ADMIN — MORPHINE SULFATE 4 MG: 4 INJECTION, SOLUTION INTRAMUSCULAR; INTRAVENOUS at 12:29

## 2017-01-01 RX ADMIN — MORPHINE SULFATE 4 MG: 4 INJECTION, SOLUTION INTRAMUSCULAR; INTRAVENOUS at 21:12

## 2017-01-01 RX ADMIN — MORPHINE SULFATE 4 MG: 4 INJECTION, SOLUTION INTRAMUSCULAR; INTRAVENOUS at 09:34

## 2017-01-01 RX ADMIN — MORPHINE SULFATE 4 MG: 4 INJECTION, SOLUTION INTRAMUSCULAR; INTRAVENOUS at 05:09

## 2017-01-01 RX ADMIN — MORPHINE SULFATE 2 MG: 2 INJECTION, SOLUTION INTRAMUSCULAR; INTRAVENOUS at 22:04

## 2017-01-01 RX ADMIN — MORPHINE SULFATE 4 MG: 4 INJECTION, SOLUTION INTRAMUSCULAR; INTRAVENOUS at 14:51

## 2017-01-01 RX ADMIN — MORPHINE SULFATE 4 MG: 4 INJECTION, SOLUTION INTRAMUSCULAR; INTRAVENOUS at 14:20

## 2017-01-01 RX ADMIN — MORPHINE SULFATE 4 MG: 4 INJECTION, SOLUTION INTRAMUSCULAR; INTRAVENOUS at 17:51

## 2017-01-01 RX ADMIN — BISACODYL 10 MG: 10 SUPPOSITORY RECTAL at 16:30

## 2017-01-01 RX ADMIN — HALOPERIDOL LACTATE 2 MG: 5 INJECTION, SOLUTION INTRAMUSCULAR at 09:58

## 2017-01-01 RX ADMIN — HALOPERIDOL LACTATE 2 MG: 5 INJECTION, SOLUTION INTRAMUSCULAR at 22:02

## 2017-01-01 RX ADMIN — MORPHINE SULFATE 4 MG: 4 INJECTION, SOLUTION INTRAMUSCULAR; INTRAVENOUS at 04:24

## 2017-01-01 RX ADMIN — MORPHINE SULFATE 2 MG: 2 INJECTION, SOLUTION INTRAMUSCULAR; INTRAVENOUS at 11:20

## 2017-01-01 RX ADMIN — HALOPERIDOL LACTATE 2 MG: 5 INJECTION, SOLUTION INTRAMUSCULAR at 22:28

## 2017-01-01 RX ADMIN — LORAZEPAM 1 MG: 2 INJECTION INTRAMUSCULAR; INTRAVENOUS at 13:41

## 2017-01-01 RX ADMIN — MORPHINE SULFATE 2 MG: 2 INJECTION, SOLUTION INTRAMUSCULAR; INTRAVENOUS at 00:25

## 2017-01-01 RX ADMIN — HALOPERIDOL LACTATE 2 MG: 5 INJECTION, SOLUTION INTRAMUSCULAR at 12:32

## 2017-01-01 RX ADMIN — MORPHINE SULFATE 2 MG: 2 INJECTION, SOLUTION INTRAMUSCULAR; INTRAVENOUS at 14:52

## 2017-01-01 RX ADMIN — MORPHINE SULFATE 5 MG: 100 SOLUTION ORAL at 22:27

## 2017-01-01 RX ADMIN — MORPHINE SULFATE 4 MG: 4 INJECTION, SOLUTION INTRAMUSCULAR; INTRAVENOUS at 03:09

## 2017-01-01 RX ADMIN — GLYCOPYRROLATE 0.2 MG: 0.2 INJECTION INTRAMUSCULAR; INTRAVENOUS at 02:43

## 2017-01-01 RX ADMIN — HALOPERIDOL LACTATE 2 MG: 5 INJECTION, SOLUTION INTRAMUSCULAR at 00:11

## 2017-01-01 RX ADMIN — MORPHINE SULFATE 4 MG: 4 INJECTION, SOLUTION INTRAMUSCULAR; INTRAVENOUS at 08:17

## 2017-01-01 RX ADMIN — MORPHINE SULFATE 2 MG: 2 INJECTION, SOLUTION INTRAMUSCULAR; INTRAVENOUS at 12:06

## 2017-01-01 RX ADMIN — HALOPERIDOL LACTATE 2 MG: 5 INJECTION, SOLUTION INTRAMUSCULAR at 12:43

## 2017-01-01 RX ADMIN — MORPHINE SULFATE 4 MG: 4 INJECTION, SOLUTION INTRAMUSCULAR; INTRAVENOUS at 18:30

## 2017-01-01 RX ADMIN — MORPHINE SULFATE 8 MG: 4 INJECTION, SOLUTION INTRAMUSCULAR; INTRAVENOUS at 23:01

## 2017-01-01 RX ADMIN — MORPHINE SULFATE 4 MG: 4 INJECTION, SOLUTION INTRAMUSCULAR; INTRAVENOUS at 14:19

## 2017-01-01 RX ADMIN — HALOPERIDOL LACTATE 2 MG: 5 INJECTION, SOLUTION INTRAMUSCULAR at 09:59

## 2017-01-01 RX ADMIN — MORPHINE SULFATE 8 MG: 4 INJECTION, SOLUTION INTRAMUSCULAR; INTRAVENOUS at 13:32

## 2017-01-01 RX ADMIN — MORPHINE SULFATE 2 MG: 2 INJECTION, SOLUTION INTRAMUSCULAR; INTRAVENOUS at 02:55

## 2017-01-01 RX ADMIN — MORPHINE SULFATE 4 MG: 4 INJECTION, SOLUTION INTRAMUSCULAR; INTRAVENOUS at 18:11

## 2017-01-01 RX ADMIN — MORPHINE SULFATE 4 MG: 4 INJECTION, SOLUTION INTRAMUSCULAR; INTRAVENOUS at 16:23

## 2017-01-01 RX ADMIN — MORPHINE SULFATE 8 MG: 4 INJECTION, SOLUTION INTRAMUSCULAR; INTRAVENOUS at 19:25

## 2017-01-01 RX ADMIN — MORPHINE SULFATE 8 MG: 4 INJECTION, SOLUTION INTRAMUSCULAR; INTRAVENOUS at 19:31

## 2017-01-01 RX ADMIN — LORAZEPAM 1 MG: 2 INJECTION INTRAMUSCULAR; INTRAVENOUS at 14:06

## 2017-01-01 RX ADMIN — HALOPERIDOL LACTATE 2 MG: 5 INJECTION, SOLUTION INTRAMUSCULAR at 14:39

## 2017-01-01 RX ADMIN — MORPHINE SULFATE 2 MG: 2 INJECTION, SOLUTION INTRAMUSCULAR; INTRAVENOUS at 04:44

## 2017-01-01 RX ADMIN — HALOPERIDOL LACTATE 2 MG: 5 INJECTION, SOLUTION INTRAMUSCULAR at 21:35

## 2017-01-01 RX ADMIN — MORPHINE SULFATE 4 MG: 4 INJECTION, SOLUTION INTRAMUSCULAR; INTRAVENOUS at 08:33

## 2017-01-01 RX ADMIN — LORAZEPAM 1 MG: 2 INJECTION INTRAMUSCULAR; INTRAVENOUS at 05:06

## 2017-01-01 RX ADMIN — HALOPERIDOL LACTATE 2 MG: 5 INJECTION, SOLUTION INTRAMUSCULAR at 02:50

## 2017-01-01 RX ADMIN — LORAZEPAM 1 MG: 1 TABLET ORAL at 13:49

## 2017-01-01 RX ADMIN — MORPHINE SULFATE 5 MG: 100 SOLUTION ORAL at 11:22

## 2017-01-01 RX ADMIN — MORPHINE SULFATE 2 MG: 2 INJECTION, SOLUTION INTRAMUSCULAR; INTRAVENOUS at 12:44

## 2017-01-01 RX ADMIN — LORAZEPAM 1 MG: 1 TABLET ORAL at 10:43

## 2017-01-01 RX ADMIN — MORPHINE SULFATE 5 MG: 100 SOLUTION ORAL at 13:53

## 2017-01-01 RX ADMIN — MORPHINE SULFATE 8 MG: 4 INJECTION, SOLUTION INTRAMUSCULAR; INTRAVENOUS at 18:37

## 2017-01-01 RX ADMIN — HALOPERIDOL LACTATE 2 MG: 5 INJECTION, SOLUTION INTRAMUSCULAR at 12:16

## 2017-01-01 RX ADMIN — MORPHINE SULFATE 4 MG: 4 INJECTION, SOLUTION INTRAMUSCULAR; INTRAVENOUS at 15:50

## 2017-01-01 RX ADMIN — HALOPERIDOL 2 MG: 1 TABLET ORAL at 03:47

## 2017-01-01 RX ADMIN — MORPHINE SULFATE 4 MG: 4 INJECTION, SOLUTION INTRAMUSCULAR; INTRAVENOUS at 12:38

## 2017-01-01 RX ADMIN — MORPHINE SULFATE 8 MG: 4 INJECTION, SOLUTION INTRAMUSCULAR; INTRAVENOUS at 04:25

## 2017-01-01 RX ADMIN — HALOPERIDOL LACTATE 2 MG: 5 INJECTION, SOLUTION INTRAMUSCULAR at 00:14

## 2017-01-01 RX ADMIN — MORPHINE SULFATE 8 MG: 4 INJECTION, SOLUTION INTRAMUSCULAR; INTRAVENOUS at 13:31

## 2017-01-01 RX ADMIN — HALOPERIDOL LACTATE 2 MG: 5 INJECTION, SOLUTION INTRAMUSCULAR at 17:03

## 2017-01-01 RX ADMIN — HALOPERIDOL LACTATE 2 MG: 5 INJECTION, SOLUTION INTRAMUSCULAR at 12:22

## 2017-01-01 RX ADMIN — GLYCOPYRROLATE 0.2 MG: 0.2 INJECTION INTRAMUSCULAR; INTRAVENOUS at 08:40

## 2017-01-01 RX ADMIN — MORPHINE SULFATE 8 MG: 4 INJECTION, SOLUTION INTRAMUSCULAR; INTRAVENOUS at 04:43

## 2017-01-01 RX ADMIN — MORPHINE SULFATE 4 MG: 4 INJECTION, SOLUTION INTRAMUSCULAR; INTRAVENOUS at 06:58

## 2017-01-01 RX ADMIN — HALOPERIDOL LACTATE 2 MG: 5 INJECTION, SOLUTION INTRAMUSCULAR at 01:09

## 2017-01-01 RX ADMIN — HALOPERIDOL LACTATE 2 MG: 5 INJECTION, SOLUTION INTRAMUSCULAR at 04:39

## 2017-01-01 RX ADMIN — HALOPERIDOL LACTATE 2 MG: 5 INJECTION, SOLUTION INTRAMUSCULAR at 13:09

## 2017-01-01 RX ADMIN — MORPHINE SULFATE 2 MG: 2 INJECTION, SOLUTION INTRAMUSCULAR; INTRAVENOUS at 23:42

## 2017-01-01 RX ADMIN — MORPHINE SULFATE 8 MG: 4 INJECTION, SOLUTION INTRAMUSCULAR; INTRAVENOUS at 21:29

## 2017-01-01 RX ADMIN — HALOPERIDOL LACTATE 2 MG: 5 INJECTION, SOLUTION INTRAMUSCULAR at 11:18

## 2017-01-01 RX ADMIN — MORPHINE SULFATE 2 MG: 2 INJECTION, SOLUTION INTRAMUSCULAR; INTRAVENOUS at 22:17

## 2017-01-01 RX ADMIN — HALOPERIDOL LACTATE 2 MG: 5 INJECTION, SOLUTION INTRAMUSCULAR at 19:38

## 2017-01-01 RX ADMIN — HALOPERIDOL LACTATE 2 MG: 5 INJECTION, SOLUTION INTRAMUSCULAR at 08:53

## 2017-01-01 RX ADMIN — MORPHINE SULFATE 4 MG: 4 INJECTION, SOLUTION INTRAMUSCULAR; INTRAVENOUS at 10:38

## 2017-01-01 RX ADMIN — HALOPERIDOL LACTATE 2 MG: 5 INJECTION, SOLUTION INTRAMUSCULAR at 16:42

## 2017-01-01 RX ADMIN — MORPHINE SULFATE 2 MG: 2 INJECTION, SOLUTION INTRAMUSCULAR; INTRAVENOUS at 13:08

## 2017-01-01 RX ADMIN — MORPHINE SULFATE 4 MG: 4 INJECTION, SOLUTION INTRAMUSCULAR; INTRAVENOUS at 09:17

## 2017-01-01 RX ADMIN — MORPHINE SULFATE 2 MG: 2 INJECTION, SOLUTION INTRAMUSCULAR; INTRAVENOUS at 21:50

## 2017-01-01 RX ADMIN — MORPHINE SULFATE 4 MG: 4 INJECTION, SOLUTION INTRAMUSCULAR; INTRAVENOUS at 12:23

## 2017-01-01 RX ADMIN — HALOPERIDOL LACTATE 2 MG: 5 INJECTION, SOLUTION INTRAMUSCULAR at 02:26

## 2017-01-01 RX ADMIN — MORPHINE SULFATE 4 MG: 4 INJECTION, SOLUTION INTRAMUSCULAR; INTRAVENOUS at 02:45

## 2017-01-01 RX ADMIN — HALOPERIDOL 2 MG: 1 TABLET ORAL at 09:17

## 2017-01-01 RX ADMIN — MORPHINE SULFATE 4 MG: 4 INJECTION, SOLUTION INTRAMUSCULAR; INTRAVENOUS at 09:59

## 2017-01-01 RX ADMIN — MORPHINE SULFATE 4 MG: 4 INJECTION, SOLUTION INTRAMUSCULAR; INTRAVENOUS at 21:40

## 2017-01-01 RX ADMIN — LORAZEPAM 1 MG: 2 INJECTION INTRAMUSCULAR; INTRAVENOUS at 01:00

## 2017-01-01 RX ADMIN — MORPHINE SULFATE 4 MG: 4 INJECTION, SOLUTION INTRAMUSCULAR; INTRAVENOUS at 14:10

## 2017-01-01 RX ADMIN — HALOPERIDOL LACTATE 2 MG: 5 INJECTION, SOLUTION INTRAMUSCULAR at 16:45

## 2017-01-01 RX ADMIN — MORPHINE SULFATE 4 MG: 4 INJECTION, SOLUTION INTRAMUSCULAR; INTRAVENOUS at 12:24

## 2017-01-01 RX ADMIN — MORPHINE SULFATE 4 MG: 4 INJECTION, SOLUTION INTRAMUSCULAR; INTRAVENOUS at 21:02

## 2017-01-01 RX ADMIN — MORPHINE SULFATE 4 MG: 4 INJECTION, SOLUTION INTRAMUSCULAR; INTRAVENOUS at 18:21

## 2017-01-02 PROBLEM — J96.01 ACUTE RESPIRATORY FAILURE WITH HYPOXIA (HCC): Status: ACTIVE | Noted: 2017-01-01

## 2017-01-02 NOTE — H&P
History and Physical    Patient: Zandra Land MRN: [id number]  SSN: [id number]    YOB: 1933  Age: 80 y.o. Sex: female      Subjective:      Zandra Land is a 80 y.o. female who was admitted to the hospital with acute hypoxic respiratory failure. She has a past medical history of lumbar stenosis, arthritis, and multiple falls. She was a resident at a rehab facility due to multiple falls and developed HCAP. She was admitted to the hospital with HCAP and SIRS with acute hypoxic respiratory failure. She was treated aggressively and then developed renal insufficiency, CHF and delirium. Blood cultures were negative for growth and urine culture was positive for pseudomonas. She has been unable to take po medications, food or fluids. Due to her recent decline, her family has elected to forgo further medical treatment and pursue comfort measures with hospice care. Patient admitted with acute hypoxic respiratory failure for management of pain, dyspnea and delirium.     Past Medical History   Diagnosis Date    Anemia 8/5/2016    Arthritis 10/8/2016    Atrial fibrillation ([address] 8/5/2016    Benign hypertensive heart and kidney disease 8/5/2016    Benign skin lesion of multiple sites     Bilateral shoulder pain     Candidiasis of breast     Cerumen impaction     CHF (congestive heart failure) ([address] 8/5/2016    Chronic kidney disease 8/5/2016    Corns and callosities     CRI (chronic renal insufficiency) 8/5/2016    Decreased circulation 10/8/2016    Deficiency, lipoprotein 8/5/2016    Dyspnea     Edema     Effusion of knee, left     Elevated sedimentation rate     Encounter for long-term (current) use of medications 8/5/2016    Gout 8/5/2016    High level of uric acid in blood     Hip pain     Hyperlipidemia 8/5/2016    Hypertension 8/5/2016    Hypomagnesemia     Inflamed skin tag     Knee pain 8/5/2016    Leg pain     Leg swelling     Menopausal disorder     Myalgia     Mycotic toenails     Myopathy, toxic     Neuralgia and neuritis 8/5/2016    Neuropathy in diabetes ([facility] 8/5/2016    Obesity 8/5/2016    Osteoarthritis of hip 11/17/2016    Osteoarthritis of shoulder 8/5/2016    Positive D dimer     Primary osteoarthritis of knee 8/5/2016    Seborrheic keratosis     Type 2 diabetes mellitus with hyperosmolar nonketotic hyperglycemia ([facility] 8/5/2016    Uncontrolled diabetes mellitus type 2 without complications ([facility] 7/1/0782    Unspecified injury of right quadriceps muscle, fascia and tendon, initial encounter 10/8/2016    Venous stasis     Wears dentures      upper and lower dentures    Wears glasses      Past Surgical History   Procedure Laterality Date    Hx orthopaedic Bilateral      foot surgery    Hx mastectomy Right      total    Hx appendectomy      Hx cataract removal Bilateral     Hx hysterectomy       BSO    Hx cholecystectomy      Hx knee replacement Right      total      Family History   Problem Relation Age of Onset    Cancer Mother      Colon    Coronary Artery Disease Father     Heart Attack Father     Heart Disease Father     Heart Disease Sister     Breast Cancer Sister     Stroke Sister     Cancer Brother      Colon    Diabetes Paternal Grandmother     Cancer Other      2 sisters with cancer-maybe colon    Diabetes Son      Social History   Substance Use Topics    Smoking status: Never Smoker    Smokeless tobacco: Not on file    Alcohol use No      Prior to Admission medications    Medication Sig Start Date End Date Taking? Authorizing Provider   traMADol (ULTRAM) 50 mg tablet Take 50 mg by mouth every six (6) hours as needed for Pain. Yes Historical Provider   senna (SENOKOT) 8.6 mg tablet Take 2 Tabs by mouth nightly. Yes Historical Provider   cyclobenzaprine (FLEXERIL) 5 mg tablet Take 5 mg by mouth two (2) times daily as needed for Muscle Spasm(s).    Yes Historical Provider   cyclobenzaprine (FLEXERIL) 5 mg tablet Take 5 mg by mouth nightly. Yes Historical Provider   amLODIPine (NORVASC) 5 mg tablet Take 5 mg by mouth daily. Yes Historical Provider   albuterol (PROVENTIL VENTOLIN) 2.5 mg /3 mL (0.083 %) nebulizer solution 2.5 mg by Nebulization route every four (4) hours as needed for Wheezing. Yes Historical Provider   benzonatate (TESSALON PERLES) 100 mg capsule Take 100 mg by mouth four (4) times daily as needed for Cough. Yes Historical Provider   simvastatin (ZOCOR) 40 mg tablet Take 40 mg by mouth daily. Yes Historical Provider   PEN NEEDLE, DIABETIC (BD INSULIN PEN NEEDLE UF SHORT) 31G x 8MM, 1 misc as directed   Yes Historical Provider   glipiZIDE (GLUCOTROL) 10 mg tablet Take 10 mg by mouth two (2) times a day. Yes Historical Provider   metoprolol tartrate (LOPRESSOR) 50 mg tablet Take 50 mg by mouth two (2) times a day. Yes Historical Provider   allopurinol (ZYLOPRIM) 100 mg tablet Take 100 mg by mouth. 2 tablets daily   Yes Historical Provider   metFORMIN ER (GLUCOPHAGE XR) 500 mg tablet Take 500 mg by mouth. 2 tablet ER 2 times daily   Yes Historical Provider   DOCOSAHEXANOIC ACID/EPA (FISH OIL PO) Take 1,200 mg by mouth. Burp-Less, 1 capsule 2 times daily   Yes Historical Provider   CALCIUM CARBONATE (CALCIUM 600 PO) Take 600 mg by mouth. 1 tablet daily   Yes Historical Provider   cholecalciferol (VITAMIN D3) 400 unit tab tablet Take 400 Units by mouth. 2 tablet daily   Yes Historical Provider   acetaminophen (TYLENOL) 500 mg tablet Take 500 mg by mouth every four (4) hours as needed for Pain. 1 tablet up to 6 a day   Indications: ARTHRITIC PAIN   Yes Historical Provider   magnesium oxide (MAG-OX) 400 mg tablet MagOx 400 400 (241.3 Mg), 1 tablet 2 times daily   Yes Historical Provider   lisinopril (PRINIVIL, ZESTRIL) 20 mg tablet Take 20 mg by mouth daily. Yes Historical Provider   CYANOCOBALAMIN, VITAMIN B-12, (VITAMIN B-12 PO) Take 1,000 mcg by mouth.  1 capsule daily   Yes Historical Provider   INSULIN DETEMIR (LEVEMIR FLEXPEN SC) 10 Units by SubCUTAneous route nightly. 100 unit/ml, 25 units at bedtime    Yes Historical Provider   aspirin 81 mg chewable tablet Take 81 mg by mouth daily. Yes Historical Provider   furosemide (LASIX) 40 mg tablet Take 40 mg by mouth daily. Historical Provider   spironolactone (ALDACTONE) 25 mg tablet Take 25 mg by mouth daily. Historical Provider        Allergies   Allergen Reactions    Lortab [Hydrocodone-Acetaminophen] Unknown (comments) and Nausea and Vomiting       Review of Systems:  Review of systems not obtained due to patient factors. Objective:     Vitals:    01/02/17 1235 01/02/17 1353   BP:  115/56   Pulse:  91   Resp:  19   Temp:  98.4 °F (36.9 °C)   Weight: 57 kg (125 lb 10.6 oz)    Height: 5' 5\" (1.651 m)         Physical Exam:  GENERAL: mild distress, appears stated age  LUNG: Coarse, diminished breath sounds with labored respirations  HEART: regular rate and rhythm, S1, S2 normal, no murmur, click, rub or gallop  ABDOMEN: soft, distended, non-tender. Bowel sounds normal. No masses,  no organomegaly  : Prater catheter with manuel urine. EXTREMITIES:  extremities normal with + pedal pulses. Moderate generalized edema. SKIN: Normal. Warm to touch. NEUROLOGIC: Eyes open, nonverbal. Unable to follow commands or answer questions.     Assessment:     Hospital Problems  Date Reviewed: 1/2/2017          Codes Class Noted POA    * (Principal)Acute respiratory failure with hypoxia [facility]) ICD-10-CM: J96.01  ICD-9-CM: 518.81  1/2/2017 Unknown              Plan:     Current Facility-Administered Medications   Medication Dose Route Frequency    morphine injection 2 mg  2 mg SubCUTAneous Q20MIN PRN    Or    morphine injection 2 mg  2 mg IntraVENous Q20MIN PRN    haloperidol lactate (HALDOL) injection 2 mg  2 mg SubCUTAneous Q1H PRN    haloperidol (HALDOL) tablet 2 mg  2 mg Oral Q1H PRN    diphenhydrAMINE (BENADRYL) capsule 25 mg  25 mg Oral Q6H PRN    diphenhydrAMINE (BENADRYL) injection 25 mg  25 mg IntraMUSCular Q6H PRN    acetaminophen (TYLENOL) suppository 650 mg  650 mg Rectal Q3H PRN    acetaminophen (TYLENOL) tablet 650 mg  650 mg Oral Q4H PRN    LORazepam (ATIVAN) tablet 1 mg  1 mg Oral Q4H PRN    LORazepam (ATIVAN) injection 1 mg  1 mg IntraMUSCular Q4H PRN    senna (SENOKOT) tablet 8.6 mg  1 Tab Oral BID    bisacodyl (DULCOLAX) suppository 10 mg  10 mg Rectal PRN    haloperidol (HALDOL) tablet 2 mg  2 mg Oral Q1H PRN    haloperidol lactate (HALDOL) injection 2 mg  2 mg SubCUTAneous Q1H PRN    albuterol-ipratropium (DUO-NEB) 2.5 MG-0.5 MG/3 ML  3 mL Nebulization Q4H PRN    glycopyrrolate (ROBINUL) injection 0.2 mg  0.2 mg SubCUTAneous Q4H PRN    morphine (ROXANOL) concentrated oral syringe 5 mg  5 mg Oral Q30MIN PRN    Or    morphine (ROXANOL) concentrated oral syringe 5 mg  5 mg SubLINGual Q30MIN PRN       Admitted with acute hypoxic respiratory failure for management of dyspnea and delirium. 1. Dyspnea: Morphine as ordered. Glycopyrrolate prn secretions. Duonebs prn.     2. Delirium: Haldol and Ativan as ordered. 3. Family/Pt Support: No family at bedside during exam. Medications and plan of care discussed with nursing staff. Will continue to monitor for symptoms and adjust medications as needed to maintain patient comfort. PPS 20%. Case discussed with Dr. Renetta Whalen and in [facility] meeting today. Patient is having difficulty with swallowing thickened liquids and sounds like she is aspirating per nursing staff.     Signed By: Alex Cain NP     January 3, 2017

## 2017-01-02 NOTE — PROGRESS NOTES
Chaplain Sara Brown made initial spiritual assessment visit this day, shortly after patient arrived at [facility]. Situation:  Patient in bed, eyes open but not focusing and patient not speaking. Patient did not respond verbally or move head or eyes when  spoke, but patient's breath did seem to slow in response to quiet words and gentle touch to upper arm. Patient's daughter and son-in-law present at bedside and active in encounter. Background:  As stated above, patient arrive at [facility] shortly before this encounter. [facility] staff alerted  to patient's arrival.  Josr Peralta learned from patient's daughter that patient comes to [facility] from [facility], and that condition had worsened drastically in the last several days.  offered to pray for patient and family, having been informed by staff prior to visit that patient and family are [employer]. Patient's daughter accepted offer of prayer, but wanted to first share some information with  about patient's recent physical challenges and spiritual history. Patient's son-in-law stated that the patient \"is an old saint,\" and went on to share, along with patient's daughter, patient's salvation story as well as the testimony of how the patient had led the patient's daughter to the [employer] when the patient's daughter was 6years old.  thanked the family for sharing the testimonies of mother and daughter, and then prayed for patient and family. Prior to end of encounter, family also spoke of having had other family members who spent their last days at [facility], and of family's belief that the patient \"is in the right place. \"    Plan of care:  Spiritual/emotional support to be provided as needed, requested, or referred.  anticipates Routine/Low bereavement care needs.

## 2017-01-02 NOTE — PROGRESS NOTES
Pt arrived via EMS. Pt moaned upon movement, but did not open eyes or follow commands. Respirations even and unlabored, breath sounds coarse bilaterally with no distress noted at this time. O2 @3L via NC. Abdomen soft, distended, but non tender, bowel sounds active in all quadrants, pt had large BM upon arrival.  Susana care provided. Prater in place draining yellow, cloudy urine. Skin tear noted to left elbow and scattered ecchymosis to all extremities. S1S2 auscultated with regular rhythm. Bed low and locked, side rails up X3, call light within reach. Daughter arrived shortly after EMS, reviewed and signed consents, as well as provided emotional support. She was grateful to have her mother here and agreed with comfort measures only at this time.

## 2017-01-03 NOTE — HSPC IDG MASTER NOTE
Hospice Interdisciplinary Group Collaborative  Date: 01/03/17  Time: 10:43 AM    ___________________    Patient: Carmen Schneider  ___________________    Diagnoses: There were no encounter diagnoses.     Current Medications:    Current Facility-Administered Medications:     morphine injection 2 mg, 2 mg, SubCUTAneous, Q20MIN PRN, 2 mg at 01/03/17 1145 **OR** morphine injection 2 mg, 2 mg, IntraVENous, Q20MIN PRN, Balbir Bun, NP    haloperidol lactate (HALDOL) injection 2 mg, 2 mg, SubCUTAneous, Q1H PRN, Balbir Bun, NP    haloperidol (HALDOL) tablet 2 mg, 2 mg, Oral, Q1H PRN, Balbir Bun, NP    diphenhydrAMINE (BENADRYL) capsule 25 mg, 25 mg, Oral, Q6H PRN, Balbir Bun, NP    diphenhydrAMINE (BENADRYL) injection 25 mg, 25 mg, IntraMUSCular, Q6H PRN, Balbir Bun, NP    acetaminophen (TYLENOL) suppository 650 mg, 650 mg, Rectal, Q3H PRN, Balbir Bun, NP    acetaminophen (TYLENOL) tablet 650 mg, 650 mg, Oral, Q4H PRN, Balbir Bun, NP    LORazepam (ATIVAN) tablet 1 mg, 1 mg, Oral, Q4H PRN, Balbir Bun, NP, 1 mg at 01/03/17 0631    LORazepam (ATIVAN) injection 1 mg, 1 mg, IntraMUSCular, Q4H PRN, Balbir Bun, NP    [employer]) tablet 8.6 mg, 1 Tab, Oral, BID, Balbir Bun, NP, 8.6 mg at 01/03/17 0677    bisacodyl (DULCOLAX) suppository 10 mg, 10 mg, Rectal, PRN, Balbir Bun, NP    haloperidol (HALDOL) tablet 2 mg, 2 mg, Oral, Q1H PRN, Balbir Bun, NP, 2 mg at 01/03/17 0917    haloperidol lactate (HALDOL) injection 2 mg, 2 mg, SubCUTAneous, Q1H PRN, Balbir Bun, NP, 2 mg at 01/03/17 1142    albuterol-ipratropium (DUO-NEB) 2.5 MG-0.5 MG/3 ML, 3 mL, Nebulization, Q4H PRN, Balbir Bun, NP    glycopyrrolate (ROBINUL) injection 0.2 mg, 0.2 mg, SubCUTAneous, Q4H PRN, Balbir Bun, NP    morphine (ROXANOL) concentrated oral syringe 5 mg, 5 mg, Oral, Q30MIN PRN, 5 mg at 01/03/17 0631 **OR** morphine (ROXANOL) concentrated oral syringe 5 mg, 5 mg, SubLINGual, Q30MIN PRN, Arna Karen, NP    Orders: Allergies: Allergies   Allergen Reactions    Lortab [Hydrocodone-Acetaminophen] Unknown (comments) and Nausea and Vomiting       ___________________    Care Team Notes          POC/IDG Notes      [facility] IDG  Notes by Jules Maza at 01/03/17 1150  Version 1 of 1    Author:  Jules Maza Service:  Jim Orellana Author Type:      Filed:  01/03/17 1217 Date of Service:  01/03/17 1150 Status:  Signed    :  Jules Maza ()           Patient: Hasmukh Wilson    Date: 01/03/17  Time: 11:50 AM    [facility]  Notes  LMSW will visit to complete the initial assessment. The family was curious if they needed to cancel the LTC bed at [facility]. LMSW advised the family to not give up a bed for long term just yet. The family will be calling the facility to check on the patient's belonging's.   She was getting rehab before going to the hospital.         Signed by: [facility] IDG Nurse Notes by Blair Mascorro at 01/03/17 1205  Version 1 of 1    Author:  Blair Mascorro Service:  Jim Orellana Author Type:  Registered Nurse    Filed:  01/03/17 1211 Date of Service:  01/03/17 1205 Status:  Signed    :  Blair Mascorro (Registered Nurse)           Patient: Hasmukh Wilson    Date: 01/03/17  Time: 12:05 PM    [facility] Nurse Notes    1st IDG since GIP admission to [facility] yesterday; patient minimally responsive with the exception of yes/no questions and observations of talking with beings unable to be seen by staff about how she is \"ready to go to heaven\"; noted aspiration with oral intake despite thickened and crushed medications; patient also noted to be dyspneic with speech; receiving PRN haldol for agitation/delirium and PRN morphine for dyspnea - both alternating between crushed and injectables; SW assessment pending    Goals of care: effectiveness of symptom management continuously evaluated to achieve optimum comfort        Signed by: Marsha Marte       [address] IDG  Notes by Haydee Frost at 01/03/17 1038  Version 1 of 1    Author:  Haydee Frost Service:  Spiritual Care Author Type:  Pastoral Care    Filed:  01/03/17 1200 Date of Service:  01/03/17 1038 Status:  Signed    :  Haydee Frost (Pastoral Care)           Patient: Lu Rodriguez    Date: 01/03/17  Time: 10:38 AM    [facility]  Notes    Spiritual Care assessment completed on January 2, 2017 by Alexis Swanson. Patient was initially at [facility] and when she became ill was hospitalized with pneumonia and sepsis. She is now GIP level of care. Patient is of R.RWolfgang Gallagher. During 's visit family shared a touching story of how patient led her daughter to Danielle Bradleyet is very important to this family. Family expressed feelings of peace with decisions for patient to be at [facility].  provide support with active listening, compassion and prayer. [facility] to follow up. Signed by: Haydee Frsot       [address] IDG Volunteer Notes by Levar Berry at 01/03/17 1124  Version 1 of 1    Author:  Levar Berry Service:  Dina Pandey Author Type:  Hospice Volunteer/    Filed:  01/03/17 1124 Date of Service:  01/03/17 1124 Status:  Signed    :  Levar Berry (Hospice Volunteer/)               [facility] Interdisciplinary Plan of Care Review     Status Codes I = Initiated C=Continued R=Revised RS = Resolved     I Volunteer     Goal: Hospice house volunteer (s) enhances the quality of remaining life while patient is at the hospice house. Interventions: Jackelin Dillard Volunteer (s) will provide companionship to the patient and/or family by visiting at the hospice house       . Jackelin Dillard Volunteer (s) will provide respite as needed when requested by patient and/or family. Bambi Mani Linward [place]  Volunteer will provide activities such as music, reading, pet therapy, etc. as requested. Rayshawn Pickerel Sissy Bound  Comfort bag delivered. Any other special requests or information regarding volunteer services:     No further needs identified at this time. These notes have been discussed in [facility] meeting.

## 2017-01-03 NOTE — HSPC IDG NURSE NOTES
Patient: Mounika Rey    Date: 01/03/17  Time: 12:05 PM    [facility] Nurse Notes    1st IDG since [facility]; patient minimally responsive with the exception of yes/no questions and observations of talking with beings unable to be seen by staff about how she is \"ready to go to heaven\"; noted aspiration with oral intake despite thickened and crushed medications; patient also noted to be dyspneic with speech; receiving PRN haldol for agitation/delirium and PRN morphine for dyspnea - both alternating between crushed and injectables; SW assessment pending    Goals of care: effectiveness of symptom management continuously evaluated to achieve optimum comfort        Signed by: Jeanne Cano

## 2017-01-03 NOTE — HSPC IDG VOLUNTEER NOTES
[address] Review     Status Codes I = Initiated C=Continued R=Revised RS = Resolved     I Volunteer     Goal: Hospice house volunteer (s) enhances the quality of remaining life while patient is at the hospice house. Interventions: Danielle Carmona Volunteer (s) will provide companionship to the patient and/or family by visiting at the hospice house       . Danielle Carmona Volunteer (s) will provide respite as needed when requested by patient and/or family. Danielle Bee  Volunteer will provide activities such as music, reading, pet therapy, etc. as requested. Danielle Bee  Comfort bag delivered. Any other special requests or information regarding volunteer services:     No further needs identified at this time. These notes have been discussed in [facility] meeting.

## 2017-01-03 NOTE — PROGRESS NOTES
The patient continues to rest with no signs of distress observed. The family continues to stay at the bedside. Patient has required no other medications this shift.

## 2017-01-03 NOTE — PROGRESS NOTES
Patient is restless, having dyspnea and asking for water. Ativan, morphine prn po given and thicken water given.

## 2017-01-03 NOTE — HSPC IDG CHAPLAIN NOTES
Patient: Malick Triana    Date: 01/03/17  Time: 10:38 AM    [facility]  Notes    Spiritual Care assessment completed on January 2, 2017 by Mitchell Nettles. Patient was initially at [facility] and when she became ill was hospitalized with pneumonia and sepsis. She is now GIP level of care. Patient is of R.RWolfgang Lioney. During 's visit family shared a touching story of how patient led her daughter to [employer] is very important to this family. Family expressed feelings of peace with decisions for patient to be at [facility].  provide support with active listening, compassion and prayer. [employer] to follow up.          Signed by: Mariaa Mullins

## 2017-01-03 NOTE — HSPC IDG SOCIAL WORKER NOTES
Patient: Lars Tucker    Date: 01/03/17  Time: 11:50 AM    [facility]  Notes  LMSW will visit to complete the initial assessment. The family was curious if they needed to cancel the LTC bed at [facility]. LMSW advised the family to not give up a bed for long term just yet. The family will be calling the facility to check on the patient's belonging's.   She was getting rehab before going to the hospital.         Signed by: Missy Cortes

## 2017-01-03 NOTE — PROGRESS NOTES
LMSW visited the pt in her room with a few family members present. Chino Kincaid the daughter and the grandson from [place]. Chino Kincaid is very concerned about giving up the Medicaid bed at [facility]. Pt was there receiving Rehab before going to the hospital.  The family was in the process of applying for Medicaid and moving her to a LTC bed in the facility. LMSW and the daughter had a lorelei conversation today about disease progress and the application for Medicaid and the time requirements needed for approval.  The daughter is focusing her attention on the facility and the bed/ medicaid in order to not have to feel what is happening to her mom. We discussed that aspect of her coping. LMSW provided emotional support and active listening. Family will be using [facility] mortuary in [place]. Pt identifies as Sabianist, but does not have a home Nond[employer]ominational.

## 2017-01-03 NOTE — PROGRESS NOTES
The shift change rounds completed with the off going RN and report was taken. The patient was identified by name and date of birth. The patient is resting quietly in the bed with no signs of distress observed. Pain =0, no signs of anxiety, SOB or other distress observed at this time. The bed is low and locked and the side rails are up times 2 for safety. Family is sleeping on cots in the room. Will continue to monitor.

## 2017-01-04 NOTE — PROGRESS NOTES
End of shift report. Pt. Is lethargic and responds to simple questions, is disoriented. Family was at bedside all night. Early morning pt. Started complaining that she couldn't breath, gave two doses of haldol and one dose of morphine SQ, pt. Was still saying she couldn't breath, but she wasn't in distress, and is now resting with her eyes closed. Prater present draining 700ml of yellow urine. Bed low and locked with siderails up X2.

## 2017-01-04 NOTE — PROGRESS NOTES
Received report from night shift RN. Patient sleeping. Identified by name and date of birth on armband. No s/sx pain, agitation, dyspnea, or N/V. Bed low and locked, side rails x2, tab alerts on, call light within reach, and door closed with daughter at bedside.

## 2017-01-04 NOTE — PROGRESS NOTES
provided a safe place for patient's daughter to tell their recent journey. As she began to open up  learned much more than what the patient had gone through. I learned that the family has had many deaths. Patient is one of 10 siblings. She and only one other are left. Patient's  is . Patient has a daughter who is . In addition to the stress of  patient's grand son in law has a elizabeth tumor. Their most resent turn of events has been the patient's decline. Patients daughter has done all that she knew possible to help her mom improve since she fell in October of last year. The patient went to rehab but while there continued the downward spiral. Daughter was in hopes of getting her mom to a spinal doctor. 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 Her organs began to be compromised and she continued to decline until it was determined she needed comfort rather than curative care. Family is at peace with patient being at Hospice. They are assured of her eternal destination. During this visit  was able to listen and affirm daughters wonderful care, affirm her clifford and offer prayer for patient and family's journey including the grand son in law who has a brain tumor. Following the visit  updated our Bereavement Coordinator, Adelaide Medina. Div of the family's additional stressors.  to visit 2x per week or as needed, requested or referred.

## 2017-01-04 NOTE — PROGRESS NOTES
Pt. Is GIP. Pt. Is lethargic, she will answer short questions and is disoriented. Family is at bedside. Prater is present draining manuel urine. Pt. Has ST on left elbow. TABS alert is on, bed low and locked, no distress noted at this time.

## 2017-01-05 NOTE — PROGRESS NOTES
ID, bedside report. She is restful, eyes closed and her family report her to be restful. FLACC  0. RR 14-16, oxygen on.

## 2017-01-05 NOTE — PROGRESS NOTES
Progress Note    Patient: Radha Timmons MRN: [id number]  SSN: [id number]    YOB: 1933  Age: 80 y.o. Sex: female      Admit Date: 1/2/2017    LOS: 2 days     Subjective:     Obtunded. Unresponsive to pain. Had 120ml of thickened liquid this am per daughter. Has required Morphine SQ x 4 for pain, Haldol x 2 po and x 3 SQ for agitation. Family at bedside. Review of Systems:  Review of systems not obtained due to patient factors. Objective:     Vitals:    01/03/17 1645 01/04/17 0723 01/04/17 1224 01/04/17 1700   BP: 121/56 (!) 112/37     Pulse: 87 88     Resp: 19 15     Temp: 97.9 °F (36.6 °C) 98.6 °F (37 °C) 99.1 °F (37.3 °C) (P) 99 °F (37.2 °C)   Weight:       Height:            Intake and Output:  Current Shift:    Last three shifts: 01/03 0701 - 01/04 1900  In: 0   Out: 1300 [Urine:1300]    Physical Exam:   GENERAL: Obtunded. LUNG: Coarse, diminished breath sounds with labored respirations  HEART: regular rate and rhythm, S1, S2 normal, no murmur, click, rub or gallop  ABDOMEN: soft, distended, non-tender. Bowel sounds normal. No masses, no organomegaly  : Pratre catheter with manuel urine. EXTREMITIES: extremities normal with + pedal pulses. Moderate generalized edema. SKIN: Normal. Warm to touch. NEUROLOGIC: Obtunded. Lab/Data Review:  No new labs resulted in the last 24 hours.     Assessment:     Principal Problem:    Acute respiratory failure with hypoxia ([facility] [address] (1/2/2017)        Plan:     Current Facility-Administered Medications   Medication Dose Route Frequency    morphine injection 2 mg  2 mg SubCUTAneous Q20MIN PRN    haloperidol lactate (HALDOL) injection 2 mg  2 mg SubCUTAneous Q1H PRN    haloperidol (HALDOL) tablet 2 mg  2 mg Oral Q1H PRN    diphenhydrAMINE (BENADRYL) capsule 25 mg  25 mg Oral Q6H PRN    diphenhydrAMINE (BENADRYL) injection 25 mg  25 mg IntraMUSCular Q6H PRN    acetaminophen (TYLENOL) suppository 650 mg  650 mg Rectal Q3H PRN    acetaminophen (TYLENOL) tablet 650 mg  650 mg Oral Q4H PRN    LORazepam (ATIVAN) tablet 1 mg  1 mg Oral Q4H PRN    LORazepam (ATIVAN) injection 1 mg  1 mg IntraMUSCular Q4H PRN    bisacodyl (DULCOLAX) suppository 10 mg  10 mg Rectal PRN    haloperidol (HALDOL) tablet 2 mg  2 mg Oral Q1H PRN    haloperidol lactate (HALDOL) injection 2 mg  2 mg SubCUTAneous Q1H PRN    albuterol-ipratropium (DUO-NEB) 2.5 MG-0.5 MG/3 ML  3 mL Nebulization Q4H PRN    glycopyrrolate (ROBINUL) injection 0.2 mg  0.2 mg SubCUTAneous Q4H PRN    morphine (ROXANOL) concentrated oral syringe 5 mg  5 mg Oral Q30MIN PRN    Or    morphine (ROXANOL) concentrated oral syringe 5 mg  5 mg SubLINGual Q30MIN PRN     Admitted with acute hypoxic respiratory failure for management of dyspnea and delirium.     1. Dyspnea: Morphine as ordered. Glycopyrrolate prn secretions. Duonebs prn.      2. Delirium: Haldol and Ativan as ordered.     3. Family/Pt Support: Family at bedside during exam. Medications and plan of care discussed with nursing staff and family. Will continue to monitor for symptoms and adjust medications as needed to maintain patient comfort. PPS 20%. Case discussed with Dr. Quita Salmon.      Signed By: Reynaldo Rider NP     January 4, 2017

## 2017-01-05 NOTE — PROGRESS NOTES
Pt medicated for pain x2,dyspnea x1, and agitation x3 overnight. Pt talking to daughter overnight at times. Pt unable to tolerate oral intake overnight. Repositioned throughout the night. No BM this night,beard with adequate urine output. Daughter at bedside. Bed in low and locked position with side rails up x2 and call light in reach.

## 2017-01-05 NOTE — PROGRESS NOTES
Restless, moaning and at times cringing, yelling out.  + FLACC grimace, moaning, extremity tension. She does not answer questions, only says \"I love you\" to her daughter. Medicated, repositioned, oral care by her daughter. Rr 20, oxygen on.

## 2017-01-05 NOTE — PROGRESS NOTES
Restless, yelling out, cringing at times. RR 20. Repositioned, without decrease in evidence of pain and is medicated. Room remains quiet, dark. Daughter at the bedside expresses appreciation for her mom's care.

## 2017-01-05 NOTE — PROGRESS NOTES
Assessment, ESAS, fall risk and b raden completed. She does not assist or resist.  She responds minimally with eye opening to persistent verbal stimulation. Repositioned. Family educated on medications available, symptoms we treat, FLACC scale and plan for the day. They ask appropriate questions, use good eye contact and participate in conversation, care. All considerations for level III fall risk initiated/continue. SRs up x 2. Call bell within reach. x__Assess for environmental safety  _x_Ensure frequently used items are in reach of the patient  _x_Place bed in lowest position with wheel locked  __Check footwear for slip resistance and proper fit  _x_Assess effect to patient with medication changes, diuretic and narcotic use  x__Assess need for equipment: bedside commode, urinal, bedpan, etc.  x__Assess and managed pain and symptoms  _x_Assess patients cognition and ability to follow instructions. Byron patient to surroundings.   x__Educate patient and family related to safety techniques  _x_Provide lighting at all times  __Use floor mats while patient in bed  x__Use tab or bed alarm at all times  _x_Position patient for comfort  _x_Assess fall trends  x__Assess for need of physical therapy  _x_Assess need of volunteer or family to sit with patient  x__Evaluate need for change in medications and other treatments  x__Encourage family to provide close supervision while transferring, ambulation, toileting, etc and to request assistance as needed  _x_Assess the need for patient to be close to nurses station  _x_Leave patient door open while unattended by family or staff  _x_Educate patient / family of no restraint policy and interventions to promote safety

## 2017-01-05 NOTE — PROGRESS NOTES
01/04/17 2001   Symptom Management   Anxiety Counseling not adequate to calm patient  (see MAR)   Nausea/Vomiting/Diarrhea (no signs/symptoms at this time)   Pain Control (no signs/symptoms at this time)   Respiratory Failure/Dyspnea/Cough Dyspnea uncontrolled or associated tachypnea and anxiety  (see MAR)   Abdominal    Last Bowel Movement Date 01/02/17

## 2017-01-05 NOTE — PROGRESS NOTES
FLACC 0. Face flushed, skin warm, dry. Family reports her to be restful. Her daughter states she will be talking with her family later today to make a decision about stopping her oxygen. She asks appropriate questions, is supportive of her mom. She is tearful, but uses good eye contact and again verbalizes appreciation for her care.

## 2017-01-05 NOTE — PROGRESS NOTES
Pt yelling \"help me\" in feeble voice. She denies pain but her facial expression is frightful and she jumps yelling out \"help\" at times. Medicated. Daughter is very verbally supportive and interactive.

## 2017-01-05 NOTE — PROGRESS NOTES
Progress Note    Patient: Nicki Velarde MRN: [id number]  SSN: [id number]    YOB: 1933  Age: 80 y.o. Sex: female      Admit Date: 1/2/2017    LOS: 3 days     Subjective:     Obtunded. Able to answer yes when asked if still having pain as RN had just previously medicated patient approx 10 minutes prior. Periods of high pitched squealing and calling out but settles with verbal calming. Dtr and son in law at bedside expressing concern about \"bed hold\" at [facility] and [address] [place] [address]. They also report that pt has expressed her desire to \"go home to [facility]\" and be \"buried with her \". In another more delirious state, she cried out to \"open the lu and let her in\". Patient has required PRN morphine and haldol for pain and agitation. Per nursing, drank small sips of thickened liquids yesterday. Review of Systems:  Review of systems not obtained due to patient factors. Objective:     Vitals:    01/03/17 1645 01/04/17 0723 01/04/17 1224 01/04/17 1700   BP: 121/56 (!) 112/37     Pulse: 87 88     Resp: 19 15     Temp: 97.9 °F (36.6 °C) 98.6 °F (37 °C) 99.1 °F (37.3 °C) 99 °F (37.2 °C)   Weight:       Height:            Intake and Output:  Current Shift:    Last three shifts: 01/03 1901 - 01/05 0700  In: 0   Out: 1250 [Urine:1250]    Physical Exam:   GENERAL: cooperative, delirious, severe distress, appears stated age, moderately obese  LUNG: rhonchi coarse throughout all lung fields. HEART: regular rate and rhythm, S1, S2 normal, no murmur, click, rub or gallop  ABDOMEN: soft, non-tender. Bowel sounds normal. No masses,  no organomegaly  EXTREMITIES: edema to all extremities with some weeping. + pedal pulses. SKIN: Normal. and no rash or abnormalities  NEUROLOGIC: Obtunded at rest.   PSYCHIATRIC: agitated    Lab/Data Review:  No new labs resulted in the last 24 hours.     Assessment:     Principal Problem:    Acute respiratory failure with hypoxia ([address] (1/2/2017)        Plan:     Current Facility-Administered Medications   Medication Dose Route Frequency    fentaNYL (DURAGESIC) 12 mcg/hr patch 1 Patch  1 Patch TransDERmal Q72H    morphine injection 2 mg  2 mg SubCUTAneous Q20MIN PRN    haloperidol lactate (HALDOL) injection 2 mg  2 mg SubCUTAneous Q1H PRN    haloperidol (HALDOL) tablet 2 mg  2 mg Oral Q1H PRN    diphenhydrAMINE (BENADRYL) capsule 25 mg  25 mg Oral Q6H PRN    diphenhydrAMINE (BENADRYL) injection 25 mg  25 mg IntraMUSCular Q6H PRN    acetaminophen (TYLENOL) suppository 650 mg  650 mg Rectal Q3H PRN    acetaminophen (TYLENOL) tablet 650 mg  650 mg Oral Q4H PRN    LORazepam (ATIVAN) tablet 1 mg  1 mg Oral Q4H PRN    LORazepam (ATIVAN) injection 1 mg  1 mg IntraMUSCular Q4H PRN    bisacodyl (DULCOLAX) suppository 10 mg  10 mg Rectal PRN    haloperidol (HALDOL) tablet 2 mg  2 mg Oral Q1H PRN    haloperidol lactate (HALDOL) injection 2 mg  2 mg SubCUTAneous Q1H PRN    albuterol-ipratropium (DUO-NEB) 2.5 MG-0.5 MG/3 ML  3 mL Nebulization Q4H PRN    glycopyrrolate (ROBINUL) injection 0.2 mg  0.2 mg SubCUTAneous Q4H PRN    morphine (ROXANOL) concentrated oral syringe 5 mg  5 mg Oral Q30MIN PRN    Or    morphine (ROXANOL) concentrated oral syringe 5 mg  5 mg SubLINGual Q30MIN PRN     1. Admitted with acute hypoxic respiratory failure for management of dyspnea, delirium, family/pt support.      2. Dyspnea: Morphine as ordered. Glycopyrrolate PRN secretions. Duonebs PRN. Added Fentanyl 12 mcg/hr due to significant PRN morphine use.       3. Delirium: Haldol and Ativan as ordered PRN.     4. Family/Pt Support: Family at bedside during exam. Ongoing plan of care including medications discussed with primary RN, Dr. Janette Cruz, and family. Discussed with family that bed hold likely not needed as pt has continued to decline and will likely pass within the next 3-5 days given her current clinical presentation. SW advised of family concern and advised them of same.  Continue to monitor and palliate symptoms as they arise.  PPS 20%.        Signed By: Angel Moe NP     January 5, 2017

## 2017-01-05 NOTE — PROGRESS NOTES
Remains restless, moaning and at times yelling out. She does tell her daughter her arms both hurt, but is not able to provide any further information. Drops of water are provided in her mouth, but she lets it roll out.   RR 20,

## 2017-01-06 NOTE — PROGRESS NOTES
Report taken from off going nurse, safety round completed, patient identified by name and . Patient resting in bed, no s/s of pain, dyspnea, agitation or n/v. Family at bed side, Fentanyl patch intact, tab alarm on, call light within reach, bed lowered and locked, continue monitoring.

## 2017-01-06 NOTE — [facility] IDG CHAPLAIN NOTES
Patient: Yefri Art    Date: 17  Time: 12:34 PM    Patient admitted on 17. Chaplain Kristy Nuñez and Zakia Powell have both provided spiritual care for patient/family. Se documentation below for Chaplain Frausto's most recent report:     provided a safe place for patient's daughter to tell their recent journey. As she began to open up  learned much more than what the patient had gone through. I learned that the family has had many deaths. Patient is one of 10 siblings. She and only one other are left. Patient's  is . Patient has a daughter who is . In addition to the stress of patient's grand son in law has a elizabeth tumor. Their most resent turn of events has been the patient's decline. Patients daughter has done all that she knew possible to help her mom improve since she fell in October of last year. The patient went to rehab but while there continued the downward spiral. Daughter was in hopes of getting her mom to a spinal doctor. 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 Her organs began to be compromised and she continued to decline until it was determined she needed comfort rather than curative care. Family is at peace with patient being at Hospice. They are assured of her eternal destination. During this visit  was able to listen and affirm daughters wonderful care, affirm her clifford and offer prayer for patient and family's journey including the grand son in law who has a brain tumor.      Following the visit  updated our Bereavement Coordinator, ARIANA Rivero Div of the family's additional stressors.        Signed by: Mirella Spangler

## 2017-01-06 NOTE — [facility] IDG SOCIAL WORKER NOTES
Patient: Rahda Timmons    Date: 01/06/17  Time: 12:31 PM    [facility]  Notes  Spoke to family about giving the \"LTC\" Bed  Up at [facility] [place]. Family is ok with that. Daughter is feeling a little better with the decline of mom.           Signed by: Chato Rasmussen

## 2017-01-06 NOTE — [facility] IDG VOLUNTEER NOTES
[address] Review     Status Codes I = Initiated C=Continued R=Revised RS = Resolved     I Volunteer     Goal: Hospice house volunteer (s) enhances the quality of remaining life while patient is at the hospice house. Interventions: Salam Skelton Volunteer (s) will provide companionship to the patient and/or family by visiting at the hospice house       . Salma Skelton Volunteer (s) will provide respite as needed when requested by patient and/or family. Salma Maravilla  Volunteer will provide activities such as music, reading, pet therapy, etc. as requested. Salma Maravilla  Comfort bag delivered. Any other special requests or information regarding volunteer services: Three visits recorded for prayer and companionship. No further needs identified at this time. These notes have been discussed in [address] [facility] meeting.

## 2017-01-06 NOTE — PROGRESS NOTES
01/05/17 2345   Pain 1   Pain Scale 1 Adult Nonverbal Pain Scale   Pain Intervention(s) 1 Medication (see MAR)   Adult Nonverbal Pain Scale   Face 1   Activity (Movement) 1   Guarding 1   Physiology (Vital Signs) 0   Respiratory 1   Total Score 4

## 2017-01-06 NOTE — [facility] IDG NURSE NOTES
Patient: Joseph Wong    Date: 01/06/17  Time: 12:34 PM    \[facility]\"" Nurse Notes    UPDATE: increased frequency of morphine required more for pain as opposed to dyspnea since the last IDG; fentanyl patch added and PRN morphine continues to be administered to manage pain and dyspnea; delirium presenting more requiring PRN haldol to manage    Goals of care: effectiveness of symptom management continuously evaluated to achieve optimum comfort      Signed by: Sukhwinder Trejo

## 2017-01-06 NOTE — PROGRESS NOTES
Patient resting in bed with eyes closed, FLACC=0, no s/s of dyspnea, agitation or n/v. Tab alarm on, call light within reach, bed lowered and locked, continue monitoring.

## 2017-01-06 NOTE — PROGRESS NOTES
Follow up to PRN, patient resting in bed with eyes closed, FLACC=0, no s/s of dyspnea, agitation or n/v, continue monitoring.

## 2017-01-06 NOTE — [facility] IDG MASTER NOTE
[facility]ospice Interdisciplinary Group Collaborative  Date: 01/09/17  Time: 11:12 AM    ___________________    Patient: Lars Tucker    ___________________    Diagnoses: There were no encounter diagnoses.     Current Medications:    Current Facility-Administered Medications:     fentaNYL (DURAGESIC) 12 mcg/hr patch 1 Patch, 1 Patch, TransDERmal, Q72H, Amberly Castellanos, NP, 1 Patch at 01/08/17 1140    morphine injection 2 mg, 2 mg, SubCUTAneous, Q20MIN PRN, 2 mg at 01/09/17 0501 **OR** [DISCONTINUED] morphine injection 2 mg, 2 mg, IntraVENous, Q20MIN PRN, Alveria Curet, NP    haloperidol lactate (HALDOL) injection 2 mg, 2 mg, SubCUTAneous, Q1H PRN, Alveria Curet, NP, 2 mg at 01/08/17 2217    haloperidol (HALDOL) tablet 2 mg, 2 mg, Oral, Q1H PRN, Alveria Curet, NP    diphenhydrAMINE (BENADRYL) capsule 25 mg, 25 mg, Oral, Q6H PRN, Alveria Curet, NP    diphenhydrAMINE (BENADRYL) injection 25 mg, 25 mg, IntraMUSCular, Q6H PRN, Alveria Curet, NP    acetaminophen (TYLENOL) suppository 650 mg, 650 mg, Rectal, Q3H PRN, Alveria Curet, NP    acetaminophen (TYLENOL) tablet 650 mg, 650 mg, Oral, Q4H PRN, Alveria Curet, NP    LORazepam (ATIVAN) tablet 1 mg, 1 mg, Oral, Q4H PRN, Alveria Curet, NP, 1 mg at 01/03/17 0631    LORazepam (ATIVAN) injection 1 mg, 1 mg, IntraMUSCular, Q4H PRN, Alveria Curet, NP, 1 mg at 01/07/17 2327    bisacodyl (DULCOLAX) suppository 10 mg, 10 mg, Rectal, PRN, Alveria Curet, NP    haloperidol (HALDOL) tablet 2 mg, 2 mg, Oral, Q1H PRN, Alveria Curet, NP, 2 mg at 01/04/17 0347    haloperidol lactate (HALDOL) injection 2 mg, 2 mg, SubCUTAneous, Q1H PRN, Alveria Curet, NP, 2 mg at 01/09/17 0501    albuterol-ipratropium (DUO-NEB) 2.5 MG-0.5 MG/3 ML, 3 mL, Nebulization, Q4H PRN, Alveria Curet, NP    glycopyrrolate (ROBINUL) injection 0.2 mg, 0.2 mg, SubCUTAneous, Q4H PRN, Alveria Curet, NP    morphine (ROXANOL) concentrated oral syringe 5 mg, 5 mg, Oral, Q30MIN PRN, 5 mg at 01/03/17 0631 **OR** morphine (ROXANOL) concentrated oral syringe 5 mg, 5 mg, SubLINGual, Q30MIN PRN, Courtney Denis NP    Orders: Allergies: Allergies   Allergen Reactions    Lortab [Hydrocodone-Acetaminophen] Unknown (comments) and Nausea and Vomiting       ___________________    Care Team Notes          POC/IDG Notes      [facility] IDG Volunteer Notes by Yasir Chow at 01/06/17 1301  Version 1 of 1    Author:  Yasir Chow Service:  Ignacio Farah Author Type:  Hospice Volunteer/    Filed:  01/06/17 1301 Date of Service:  01/06/17 1301 Status:  Signed    :  Yasir Chow (Hospice Volunteer/)               [facility] Interdisciplinary Plan of Care Review     Status Codes I = Initiated C=Continued R=Revised RS = Resolved     I Volunteer     Goal: Hospice house volunteer (s) enhances the quality of remaining life while patient is at the hospice house. Interventions: Issa Porras Volunteer (s) will provide companionship to the patient and/or family by visiting at the hospice house       . Issa Porras Volunteer (s) will provide respite as needed when requested by patient and/or family. Issa Goodson  Volunteer will provide activities such as music, reading, pet therapy, etc. as requested. Issa Goodson  Comfort bag delivered. Any other special requests or information regarding volunteer services: Three visits recorded for prayer and companionship. No further needs identified at this time. These notes have been discussed in [facility] meeting.        [facility] IDG Nurse Notes by Kayla Carlson at 01/06/17 1234  Version 1 of 1    Author:  Kayla Carlson Service:  Ignacio Farah Author Type:  Registered Nurse    Filed:  01/06/17 2209 Date of Service:  01/06/17 1232 Status:  Signed    :  Kayla Carlson (Registered Nurse)           Patient: Mark Moore    Date: 01/06/17  Time: 12:34 PM    [facility] Nurse Notes    UPDATE: increased frequency of morphine required more for pain as opposed to dyspnea since the last IDG; fentanyl patch added and PRN morphine continues to be administered to manage pain and dyspnea; delirium presenting more requiring PRN haldol to manage    Goals of care: effectiveness of symptom management continuously evaluated to achieve optimum comfort      Signed by: Peggy MONET [facility] IDG  Notes by Mitchell Gallo at 17 1234  Version 1 of 1    Author:  Mitchell Gallo Service:  Claire Malik Author Type:  Pastoral Care    Filed:  17 1235 Date of Service:  17 1234 Status:  Signed    :  Mitchell Gallo ([address])           Patient: Kevin Garcia    Date: 17  Time: 12:34 PM    Patient admitted on 17.  Barbara Negro and Aracely Nagy have both provided spiritual care for patient/family. Se documentation below for richmond Frausto's most recent report:     provided a safe place for patient's daughter to tell their recent journey. As she began to open up  learned much more than what the patient had gone through. I learned that the family has had many deaths. Patient is one of 10 siblings. She and only one other are left. Patient's  is . Patient has a daughter who is . In addition to the stress of patient's grand son in law has a elizabeth tumor. Their most resent turn of events has been the patient's decline. Patients daughter has done all that she knew possible to help her mom improve since she fell in October of last year. The patient went to rehab but while there continued the downward spiral. Daughter was in hopes of getting her mom to a spinal doctor. 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 Her organs began to be compromised and she continued to decline until it was determined she needed comfort rather than curative care. Family is at peace with patient being at Hospice.  They are assured of her eternal destination. During this visit  was able to listen and affirm daughters wonderful care, affirm her clifford and offer prayer for patient and family's journey including the grand son in law who has a brain tumor.      Following the visit  updated our Bereavement Coordinator, ARIANA Rome Div of the family's additional stressors. Signed by: Cha Arce       [facility] IDG  Notes by Pushpa Willett at 01/06/17 1231  Version 1 of 1    Author:  Pushpa Willett Service:  Ledon Kocher Author Type:      Filed:  01/06/17 1233 Date of Service:  01/06/17 1231 Status:  Signed    :  Pushpa Willett ()           Patient: Myron Freeman    Date: 01/06/17  Time: 12:31 PM    hospitals  Notes  Spoke to family about giving the \"LTC\" Bed  Up at [facility]. Family is ok with that. Daughter is feeling a little better with the decline of mom. Signed by: Pushpa Willett       [facility] IDG  Notes by Pushpa Willett at 01/03/17 1150  Version 1 of 1    Author:  Pushpa Willett Service:  Ledon Kocher Author Type:      Filed:  01/03/17 1217 Date of Service:  01/03/17 1150 Status:  Signed    :  Pushpa Willett ()           Patient: Myron Freeman    Date: 01/03/17  Time: 11:50 AM    hospitals  Notes  LMSW will visit to complete the initial assessment. The family was curious if they needed to cancel the LTC bed at [facility]. LMSW advised the family to not give up a bed for long term just yet. The family will be calling the facility to check on the patient's belonging's.   She was getting rehab before going to the hospital.         Signed by: Pushpa Willett       [facility] IDG Nurse Notes by Emanuel Rivers at 01/03/17 1205  Version 1 of 1    Author:  Emanuel Rivers Service:  Ledon Kocher Author Type:  Registered Nurse    Filed:  01/03/17 1211 Date of Service: 01/03/17 1205 Status:  Signed    :  Jeanne Cano (Registered Nurse)           Patient: Mounika Rey    Date: 01/03/17  Time: 12:05 PM    [address] Nurse Notes    1st IDG since [facility] admission to [facility] yesterday; patient minimally responsive with the exception of yes/no questions and observations of talking with beings unable to be seen by staff about how she is \"ready to go to heaven\"; noted aspiration with oral intake despite thickened and crushed medications; patient also noted to be dyspneic with speech; receiving PRN haldol for agitation/delirium and PRN morphine for dyspnea - both alternating between crushed and injectables; SW assessment pending    Goals of care: effectiveness of symptom management continuously evaluated to achieve optimum comfort        Signed by: Jeanne Cano       [address] [facility]  Notes by Karyle Richard at 01/03/17 1038  Version 1 of 1    Author:  Karyle Richard Service:  Spiritual Care Author Type:  Pastoral Care    Filed:  01/03/17 1200 Date of Service:  01/03/17 1038 Status:  Signed    :  Karyle Richard (Pastoral Care)           Patient: Mounika Rey    Date: 01/03/17  Time: 10:38 AM    [facility]  Notes    Spiritual Care assessment completed on January 2, 2017 by Albert Richards. Patient was initially at [facility] and when she became ill was hospitalized with pneumonia and sepsis. She is now [facility] level of care. Patient is of [employer]. During 's visit family shared a touching story of how patient led her daughter to [facility] [place] is very important to this family. Family expressed feelings of peace with decisions for patient to be at [facility].  provide support with active listening, compassion and prayer. [employer] to follow up.          Signed by: Karyle Richard       [address] ID Volunteer Notes by Tata Richmond at 01/03/17 1124  Version 1 of 1    Author:  Tata Richmond Service:  HOSPICE Author Type:  Hospice Volunteer/    Filed:  01/03/17 1124 Date of Service:  01/03/17 1124 Status:  Signed    :  Courtney Mcgrath (Hospice Volunteer/)               [facility] Interdisciplinary Plan of Care Review     Status Codes I = Initiated C=Continued R=Revised RS = Resolved     I Volunteer     Goal: Hospice house volunteer (s) enhances the quality of remaining life while patient is at the hospice house. Interventions: Katlin Sommer Volunteer (s) will provide companionship to the patient and/or family by visiting at the hospice house       . Katlin Sommer Volunteer (s) will provide respite as needed when requested by patient and/or family. Lianne Barrio Leisa Barthel  Volunteer will provide activities such as music, reading, pet therapy, etc. as requested. Lianne Barrio Leisa Barthel  Comfort bag delivered. Any other special requests or information regarding volunteer services:     No further needs identified at this time. These notes have been discussed in [facility] meeting.

## 2017-01-06 NOTE — FAMILY MEETING
LMSW spoke to the pt's daughter again about the bed hold at the facility. LMSW advised her that we thought she could let the bed go. Pt is declining and we have no plans for discharge at this time. Daughter seemed to relax a little bit having a definite answer.

## 2017-01-07 NOTE — PROGRESS NOTES
FLACC 0. Skin warm, dry. Family report she has had no oral intake today, \"only wiping her mouth out with sponges\". They deny questions/concerns. I verify that a minimum of hourly rounds have been provided for this patient during this shift. Meds:  Morphine, haldol x 1 with excellent symptom control. Given SQ    Family/Education:  Family can enumerate changes over the past days and decline. They are tearful but report \"she's ready and so are we\". Oral intake: None    New problems/issues: None    Summary/general care: Remains total care, no oral intake. Good symptom management with occasional injectable medication.

## 2017-01-07 NOTE — PROGRESS NOTES
FLACC 0.  REstful and quiet. Family report her to be \"resting comfortably\". Rr 14-16, easy and even.

## 2017-01-07 NOTE — PROGRESS NOTES
ID, bedside report rec'd. Family at the bedside report her to be restful. FLACC 0.  RR 16, easy. Oxygen remains on.

## 2017-01-07 NOTE — PROGRESS NOTES
Progress Note    Patient: Nicki Velarde MRN: [id number]  SSN: [id number]    YOB: 1933  Age: 80 y.o. Sex: female      Admit Date: 1/2/2017    LOS: 5 days     Subjective:     Obtunded. Nods head inconsistently to questions asked. Patient has required PRN morphine for pain, ativan for anxiety, and haldol for agitation. Per nursing, drank small amount of thickened liquids today. Family at bedside. Review of Systems:  Review of systems not obtained due to patient factors. Objective:     Vitals:    01/05/17 1629 01/06/17 0935 01/06/17 1626 01/07/17 0552   BP: 131/54 122/43 118/44 142/65   Pulse: 85 86 88 83   Resp: 16 16 17 22   Temp: 95.9 °F (35.5 °C) 97.8 °F (36.6 °C) 97.4 °F (36.3 °C) 97.8 °F (36.6 °C)   Weight:       Height:            Intake and Output:  Current Shift:    Last three shifts: 01/05 1901 - 01/07 0700  In: -   Out: 700 [Urine:700]    Physical Exam:   GENERAL: cooperative, delirious, no distress, appears stated age, moderately obese  LUNG: rhonchi coarse throughout all lung fields. HEART: regular rate and rhythm, S1, S2 normal, no murmur, click, rub or gallop  ABDOMEN: soft, non-tender. Bowel sounds normal. No masses,  no organomegaly  EXTREMITIES: edema to all extremities with some weeping. + pedal pulses. SKIN: Normal. and no rash or abnormalities  NEUROLOGIC: Obtunded at rest.   PSYCHIATRIC: non-focal    Lab/Data Review:  No new labs resulted in the last 24 hours.     Assessment:     Principal Problem:    Acute respiratory failure with hypoxia (HCC) (1/2/2017)        Plan:     Current Facility-Administered Medications   Medication Dose Route Frequency    fentaNYL (DURAGESIC) 12 mcg/hr patch 1 Patch  1 Patch TransDERmal Q72H    morphine injection 2 mg  2 mg SubCUTAneous Q20MIN PRN    haloperidol lactate (HALDOL) injection 2 mg  2 mg SubCUTAneous Q1H PRN    haloperidol (HALDOL) tablet 2 mg  2 mg Oral Q1H PRN    diphenhydrAMINE (BENADRYL) capsule 25 mg  25 mg Oral Q6H PRN    diphenhydrAMINE (BENADRYL) injection 25 mg  25 mg IntraMUSCular Q6H PRN    acetaminophen (TYLENOL) suppository 650 mg  650 mg Rectal Q3H PRN    acetaminophen (TYLENOL) tablet 650 mg  650 mg Oral Q4H PRN    LORazepam (ATIVAN) tablet 1 mg  1 mg Oral Q4H PRN    LORazepam (ATIVAN) injection 1 mg  1 mg IntraMUSCular Q4H PRN    bisacodyl (DULCOLAX) suppository 10 mg  10 mg Rectal PRN    haloperidol (HALDOL) tablet 2 mg  2 mg Oral Q1H PRN    haloperidol lactate (HALDOL) injection 2 mg  2 mg SubCUTAneous Q1H PRN    albuterol-ipratropium (DUO-NEB) 2.5 MG-0.5 MG/3 ML  3 mL Nebulization Q4H PRN    glycopyrrolate (ROBINUL) injection 0.2 mg  0.2 mg SubCUTAneous Q4H PRN    morphine (ROXANOL) concentrated oral syringe 5 mg  5 mg Oral Q30MIN PRN    Or    morphine (ROXANOL) concentrated oral syringe 5 mg  5 mg SubLINGual Q30MIN PRN     1. Admitted with acute hypoxic respiratory failure for management of dyspnea, delirium, family/pt support.      2. Dyspnea: Morphine as ordered. Glycopyrrolate PRN secretions. Duonebs PRN. Continue Fentanyl 12 mcg/hr and appears to be managing pain well.       3. Delirium: Haldol and Ativan as ordered PRN.     4. Family/Pt Support: Family at bedside during exam. Ongoing plan of care including medications discussed with primary RN. Continue to monitor and palliate symptoms as they arise.  PPS 20%.        Signed By: Edu Cano NP     January 7, 2017

## 2017-01-07 NOTE — PROGRESS NOTES
FLACC 0. She occasionally moans while we are talking. Family reports when the room is quiet she doesn't do this, and decline medication. RR 16, easy and even. Skin warm, dry.

## 2017-01-07 NOTE — PROGRESS NOTES
Report received from off going RN. Patient round. Patient identified by name and . Patient resting in bed with eyes open. No s/sx of pain. No distress noted. RR even and unlabored. Bed low and locked. Family at the bedside. Call bell within reach.

## 2017-01-07 NOTE — PROGRESS NOTES
Restless. Attempted to reposition but FLACC remains + grimace, moaning and she is medicated. Room quiet, dark. Family remains at the bedside.

## 2017-01-07 NOTE — PROGRESS NOTES
ASsessment, ESAS, fall risk and ana completed. She does not assist or resist.  She moans on occasion. She does attempt to verbally answer when her family encourage her. Repositioned, oral care provided. All considerations for level III fall risk initiated/continue. SRs up x 2. Call bell within reach. x__Assess for environmental safety  _x_Ensure frequently used items are in reach of the patient  _x_Place bed in lowest position with wheel locked  __Check footwear for slip resistance and proper fit  _x_Assess effect to patient with medication changes, diuretic and narcotic use  x__Assess need for equipment: bedside commode, urinal, bedpan, etc.  x__Assess and managed pain and symptoms  _x_Assess patients cognition and ability to follow instructions. [place] patient to surroundings.   x__Educate patient and family related to safety techniques  _x_Provide lighting at all times  __Use floor mats while patient in bed  x__Use tab or bed alarm at all times  _x_Position patient for comfort  _x_Assess fall trends  x__Assess for need of physical therapy  _x_Assess need of volunteer or family to sit with patient  x__Evaluate need for change in medications and other treatments  x__Encourage family to provide close supervision while transferring, ambulation, toileting, etc and to request assistance as needed  _x_Assess the need for patient to be close to nurses station  _x_Leave patient door open while unattended by family or staff  _x_Educate patient / family of no restraint policy and interventions to promote safety

## 2017-01-08 NOTE — PROGRESS NOTES
Report received from off going RN. Patient round. Patient identified by name and . Patient with s/sx of pain and agitated. Will medicate as ordered. Bed low and locked. Family at the bedside. Call bell within reach.

## 2017-01-08 NOTE — PROGRESS NOTES
Multiple visitors at bedside. Pt resting quietly with eyes closed, asked her if she needed pain medication. States she does not.

## 2017-01-08 NOTE — PROGRESS NOTES
Report received from off-going nurse, visual identification made, assumed care of pt. Pt resting quietly with eyes closed, no agitation or restlessness, no grimacing or groaning. Pt respirations unlabored. Tab alert in place, rails up x 2, bed in lowest position, safety maintained. FLACC 0.

## 2017-01-09 NOTE — [facility] IDG SOCIAL WORKER NOTES
Patient: Malick Triana    Date: 01/09/17  Time: 2:41 PM    [facility]  Notes  LMSW continues to offer emotional support. No community resources have been needed for the family or the pt. Pt continues to decline.          Signed by: Constantino Mc

## 2017-01-09 NOTE — [facility] IDG NURSE NOTES
Patient: Ira Saldivar    Date: 01/09/17  Time: 2:42 PM    [facility] Nurse Notes    UPDATE: patient not eating although taking sips at times; continues to see people in the room and asking for them to \"open the gate\"; continues to require PRN morphine injectables for breakthrough pain in addition to fentanyl 12mcg patch; receiving 3-4 doses of PRN haldol injectables daily; ongoing support for patient and family    Goals of care: effectiveness of symptom management continuously evaluated to achieve optimum comfort and ultimately a peaceful death        Signed by: Sebastian Arboleda

## 2017-01-09 NOTE — PROGRESS NOTES
Progress Note    Patient: Keila Sandoval MRN: [id number]  SSN: [id number]    YOB: 1933  Age: 80 y.o. Sex: female      Admit Date: 1/2/2017    LOS: 7 days     Subjective:     Obtunded. Patient has required PRN morphine for pain, ativan for anxiety, and haldol for agitation. Per nursing, drank scant amount of thickened liquids and ate a few bites today. Family at bedside. Review of Systems:  Review of systems not obtained due to patient factors. Objective:     Vitals:    01/08/17 0634 01/08/17 1612 01/09/17 0626 01/09/17 1620   BP: 134/55  121/56 130/60   Pulse: 80  77 77   Resp: 16  19 18   Temp: 98 °F (36.7 °C) 97.7 °F (36.5 °C) 98 °F (36.7 °C) 98 °F (36.7 °C)   Weight:       Height:            Intake and Output:  Current Shift: 01/09 0701 - 01/09 1900  In: -   Out: 400 [Urine:400]  Last three shifts: 01/07 1901 - 01/09 0700  In: -   Out: 1200 [Urine:1200]    Physical Exam:   GENERAL: cooperative, delirious, no distress, appears stated age, moderately obese  LUNG: rhonchi coarse throughout all lung fields. HEART: regular rate and rhythm, S1, S2 normal, no murmur, click, rub or gallop  ABDOMEN: soft, non-tender. Bowel sounds normal. No masses,  no organomegaly  EXTREMITIES: edema to all extremities with some weeping. + pedal pulses. SKIN: Normal. and no rash or abnormalities  NEUROLOGIC: Obtunded at rest.   PSYCHIATRIC: non-focal    Lab/Data Review:  No new labs resulted in the last 24 hours.     Assessment:     Principal Problem:    Acute respiratory failure with hypoxia (HCC) (1/2/2017)        Plan:     Current Facility-Administered Medications   Medication Dose Route Frequency    fentaNYL (DURAGESIC) 12 mcg/hr patch 1 Patch  1 Patch TransDERmal Q72H    morphine injection 2 mg  2 mg SubCUTAneous Q20MIN PRN    haloperidol lactate (HALDOL) injection 2 mg  2 mg SubCUTAneous Q1H PRN    haloperidol (HALDOL) tablet 2 mg  2 mg Oral Q1H PRN    diphenhydrAMINE (BENADRYL) capsule 25 mg  25 mg Oral Q6H PRN    diphenhydrAMINE (BENADRYL) injection 25 mg  25 mg IntraMUSCular Q6H PRN    acetaminophen (TYLENOL) suppository 650 mg  650 mg Rectal Q3H PRN    acetaminophen (TYLENOL) tablet 650 mg  650 mg Oral Q4H PRN    LORazepam (ATIVAN) tablet 1 mg  1 mg Oral Q4H PRN    LORazepam (ATIVAN) injection 1 mg  1 mg IntraMUSCular Q4H PRN    bisacodyl (DULCOLAX) suppository 10 mg  10 mg Rectal PRN    haloperidol (HALDOL) tablet 2 mg  2 mg Oral Q1H PRN    haloperidol lactate (HALDOL) injection 2 mg  2 mg SubCUTAneous Q1H PRN    albuterol-ipratropium (DUO-NEB) 2.5 MG-0.5 MG/3 ML  3 mL Nebulization Q4H PRN    glycopyrrolate (ROBINUL) injection 0.2 mg  0.2 mg SubCUTAneous Q4H PRN    morphine (ROXANOL) concentrated oral syringe 5 mg  5 mg Oral Q30MIN PRN    Or    morphine (ROXANOL) concentrated oral syringe 5 mg  5 mg SubLINGual Q30MIN PRN     1. Admitted with acute hypoxic respiratory failure for management of dyspnea, delirium, family/pt support.      2. Dyspnea: Morphine as ordered. Glycopyrrolate PRN secretions. Duonebs PRN. Continue Fentanyl 12 mcg/hr and appears to be managing pain well.       3. Delirium: Haldol and Ativan as ordered PRN.     4. Family/Pt Support: Family at bedside during exam. Ongoing plan of care including medications discussed with primary RN. Continue to monitor and palliate symptoms as they arise.  PPS 20%.        Signed By: Cleo Parra NP     January 9, 2017

## 2017-01-09 NOTE — [facility] IDG MASTER NOTE
[facility]  Date: 01/10/17  Time: 12:13 PM    ___________________    Patient: Heriberto Curtis    ___________________    Diagnoses: There were no encounter diagnoses.     Current Medications:    Current Facility-Administered Medications:     fentaNYL (DURAGESIC) 12 mcg/hr patch 1 Patch, 1 Patch, TransDERmal, Q72H, Yolette Robles, LORENZO, 1 Patch at 01/08/17 1140    morphine injection 2 mg, 2 mg, SubCUTAneous, Q20MIN PRN, 2 mg at 01/10/17 0420 **OR** [DISCONTINUED] morphine injection 2 mg, 2 mg, IntraVENous, Q20MIN PRN, Steven Bodily, NP    haloperidol lactate (HALDOL) injection 2 mg, 2 mg, SubCUTAneous, Q1H PRN, Steven Bodily, NP, 2 mg at 01/08/17 2217    haloperidol (HALDOL) tablet 2 mg, 2 mg, Oral, Q1H PRN, Steven Bodily, NP    diphenhydrAMINE (BENADRYL) capsule 25 mg, 25 mg, Oral, Q6H PRN, Steven Bodily, NP    diphenhydrAMINE (BENADRYL) injection 25 mg, 25 mg, IntraMUSCular, Q6H PRN, Steven Bodily, NP    acetaminophen (TYLENOL) suppository 650 mg, 650 mg, Rectal, Q3H PRN, Steven Bodily, NP    acetaminophen (TYLENOL) tablet 650 mg, 650 mg, Oral, Q4H PRN, Steven Bodily, NP    LORazepam (ATIVAN) tablet 1 mg, 1 mg, Oral, Q4H PRN, Steven Bodily, NP, 1 mg at 01/03/17 0631    LORazepam (ATIVAN) injection 1 mg, 1 mg, IntraMUSCular, Q4H PRN, Steven Bodily, NP, 1 mg at 01/07/17 2327    bisacodyl (DULCOLAX) suppository 10 mg, 10 mg, Rectal, PRN, Steven Bodily, NP    haloperidol (HALDOL) tablet 2 mg, 2 mg, Oral, Q1H PRN, Steven Bodily, NP, 2 mg at 01/04/17 0347    haloperidol lactate (HALDOL) injection 2 mg, 2 mg, SubCUTAneous, Q1H PRN, Steven Bodily, NP, 2 mg at 01/10/17 0026    albuterol-ipratropium (DUO-NEB) 2.5 MG-0.5 MG/3 ML, 3 mL, Nebulization, Q4H PRN, Steven Bodily, NP    glycopyrrolate (ROBINUL) injection 0.2 mg, 0.2 mg, SubCUTAneous, Q4H PRN, Steven Bodily, NP    morphine (ROXANOL) concentrated oral syringe 5 mg, 5 mg, Oral, Q30MIN PRN, 5 mg at 01/03/17 0631 **OR** morphine (ROXANOL) concentrated oral syringe 5 mg, 5 mg, SubLINGual, Q30MIN PRN, Stevenson Zaidi NP    Orders: Allergies: Allergies   Allergen Reactions    Lortab [Hydrocodone-Acetaminophen] Unknown (comments) and Nausea and Vomiting       ___________________    Care Team Notes          POC/IDG Notes      [facility] IDG  Notes by Vladimir Miller at 17  Version 1 of 1    Author:  Vladimir Mliler Service:  Spiritual Care Author Type:  Pastoral Care    Filed:  17 1447 Date of Service:  17 Status:  Signed    :  Vladimir Miller (Pastoral Care)           Patient: Maria Fernanda Ceballos    Date: 17  Time: 9:44 AM    [facility]  Notes   provided a safe place for patient's daughter to tell their recent journey. As she began to open up  learned much more than what the patient had gone through. This  family has had many deaths. Patient is one of 10 siblings. She and only one other are left. Patient's  is . Patient has a daughter who is . In addition to the stress of patient's grand son in law has a elizabeth tumor.  offered support with compassion, empathy, listening, reflection and prayer.  also spoke to bereavement coordinator about the various grief issues this family has faced.  to continued to provide support throughout patient's stay at [facility].         Signed by: Vladimir Miller       [facility] IDG Nurse Notes by Pattie Fields at 17  Version 1 of 1    Author:  Pattie Fields Service:  Sherie White Author Type:  Registered Nurse    Filed:  17 4966 Date of Service:  17 Status:  Signed    :  Pattie Fields (Registered Nurse)           Patient: Maria Fernanda Ceballos    Date: 17  Time: 2:42 PM    [facility] Nurse Notes    UPDATE: patient not eating although taking sips at times; continues to see people in the room and asking for them to \"open the gate\"; continues to require PRN morphine injectables for breakthrough pain in addition to fentanyl 12mcg patch; receiving 3-4 doses of PRN haldol injectables daily; ongoing support for patient and family    Goals of care: effectiveness of symptom management continuously evaluated to achieve optimum comfort and ultimately a peaceful death        Signed by: Sascha MONET [facility] IDG  Notes by Yajaira Allen at 01/09/17 1441  Version 1 of 1    Author:  Yajaira Allen Service:  Pallavi Mejia Author Type:      Filed:  01/09/17 1444 Date of Service:  01/09/17 1441 Status:  Signed    :  Yajaira Allen ()           Patient: Melissa Edmonds    Date: 01/09/17  Time: 2:41 PM    [facility]  Notes  LMSW continues to offer emotional support. No community resources have been needed for the family or the pt. Pt continues to decline. Signed by: Nivela       [facility] IDG Volunteer Notes by Franki Gottlieb at 01/06/17 1301  Version 1 of 1    Author:  Franki Gottlieb Service:  Pallavi Mejia Author Type:  Hospice Volunteer/    Filed:  01/06/17 1301 Date of Service:  01/06/17 1301 Status:  Signed    :  Franki Gottlieb (Hospice Volunteer/)               [address] Review     Status Codes I = Initiated C=Continued R=Revised RS = Resolved     I Volunteer     Goal: Hospice house volunteer (s) enhances the quality of remaining life while patient is at the hospice house. Interventions: Salma Skelton Volunteer (s) will provide companionship to the patient and/or family by visiting at the hospice house       . Salma Skelton Volunteer (s) will provide respite as needed when requested by patient and/or family. Salma Maravilla  Volunteer will provide activities such as music, reading, pet therapy, etc. as requested. Salma Maravilla  Comfort bag delivered.         Any other special requests or information regarding volunteer services: Three visits recorded for prayer and companionship. No further needs identified at this time. These notes have been discussed in [facility] meeting. [address] [place] [address] Nurse Notes by Cornel Hardy at 17 1234  Version 1 of 1    Author:  Cornel Hardy Service:  Steff Vincent Author Type:  Registered Nurse    Filed:  17 1237 Date of Service:  17 1234 Status:  Signed    :  Cornel Hardy (Registered Nurse)           Patient: Angelito Gomez    Date: 17  Time: 12:34 PM    [address] [place] [address] Nurse Notes    UPDATE: increased frequency of morphine required more for pain as opposed to dyspnea since the last IDG; fentanyl patch added and PRN morphine continues to be administered to manage pain and dyspnea; delirium presenting more requiring PRN haldol to manage    Goals of care: effectiveness of symptom management continuously evaluated to achieve optimum comfort      Signed by: Cornel Hardy       [address] [facility]  Notes by Pineda Fernando at 17 1234  Version 1 of 1    Author:  Pineda Fernando Service:  Steff Vincent Author Type:  Pastoral Care    Filed:  17 1235 Date of Service:  17 1234 Status:  Signed    :  Pineda Fernando (Pastoral Care)           Patient: Angelito Gomez    Date: 17  Time: 12:34 PM    Patient admitted on 17. richmond Payton and Melinda Pollard have both provided spiritual care for patient/family. Se documentation below for Chaplain Frausto's most recent report:     provided a safe place for patient's daughter to tell their recent journey. As she began to open up  learned much more than what the patient had gone through. I learned that the family has had many deaths. Patient is one of 10 siblings. She and only one other are left. Patient's  is . Patient has a daughter who is . In addition to the stress of patient's grand son in law has a elizabeth tumor. Their most resent turn of events has been the patient's decline.  Patients daughter has done all that she knew possible to help her mom improve since she fell in October of last year. The patient went to rehab but while there continued the downward spiral. Daughter was in hopes of getting her mom to a spinal doctor. 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 Her organs began to be compromised and she continued to decline until it was determined she needed comfort rather than curative care. Family is at peace with patient being at Hospice. They are assured of her eternal destination. During this visit  was able to listen and affirm daughters wonderful care, affirm her clifford and offer prayer for patient and family's journey including the grand son in law who has a brain tumor.      Following the visit  updated our Bereavement Coordinator, ARIANA Chau Div of the family's additional stressors. Signed by: Rita Ontiveros       [facility] ANA [facility]UL[facility]  Notes by Jordin Marcano at 01/06/17 1231  Version 1 of 1    Author:  Jordin Marcano Service:  Mary Tucker Author Type:      Filed:  01/06/17 1233 Date of Service:  01/06/17 1231 Status:  Signed    :  Jordin Marcano ()           Patient: Randall Evans    Date: 01/06/17  Time: 12:31 PM    [facility]  Notes  Spoke to family about giving the \"LTC\" Bed  Up at [facility]. Family is ok with that. Daughter is feeling a little better with the decline of mom.           S[facility]: [facility]  Notes by Jordin Marcano at 01/03/17 1150  Version 1 of 1    Author:  Jordin Marcano Service:  Mray Tucker Author Type:      Filed:  01/03/17 1217 Date of Service:  01/03/17 1150 Status:  Signed    :  Jordin Marcano ()           Patient: Randall Evans    Date: 01/03/17  Time: 11:50 AM    [facility]  Notes  LMSW will visit to complete the initial assessment. The family was curious if they needed to cancel the LTC bed at [facility]. LMSW advised the family to not give up a bed for long term just yet. The family will be calling the facility to check on the patient's belonging's. She was getting rehab before going to the hospital.         Signed by: Leland Wong       [facility] IDG Nurse Notes by Pattie Fields at 01/03/17 1205  Version 1 of 1    Author:  Pattie Fields Service:  Sherie White Author Type:  Registered Nurse    Filed:  01/03/17 1211 Date of Service:  01/03/17 1205 Status:  Signed    :  Pattie Fields (Registered Nurse)           Patient: Maria Fernanda Ceballos    Date: 01/03/17  Time: 12:05 PM    [facility] Nurse Notes    1st IDG since [facility] admission [facility]; patient minimally responsive with the exception of yes/no questions and observations of talking with beings unable to be seen by staff about how she is \"ready to go to heaven\"; noted aspiration with oral intake despite thickened and crushed medications; patient also noted to be dyspneic with speech; receiving PRN haldol for agitation/delirium and PRN morphine for dyspnea - both alternating between crushed and injectables; SW assessment pending    Goals of care: effectiveness of symptom management continuously evaluated to achieve optimum comfort        Signed by: Pattie Fields       [facility] IDG  Notes by Vladimir Miller at 01/03/17 1038  Version 1 of 1    Author:  Vladimir Miller Service:  Spiritual Care Author Type:  Pastoral Care    Filed:  01/03/17 1200 Date of Service:  01/03/17 1038 Status:  Signed    :  Vladimir Miller (Pastoral Care)           Patient: Maria Fernanda Ceballos    Date: 01/03/17  Time: 10:38 AM    [facility]  Notes    Spiritual Care assessment completed on January 2, 2017 by Corry oMjica. Patient was initially at [facility] and when she became ill was hospitalized with pneumonia and sepsis.  She is now [facility] level of care. Patient is of R.RWolfgang Gallagher. During 's visit family shared a touching story of how patient led her daughter to Diane Crook is very important to this family. Family expressed feelings of peace with decisions for patient to be at [facility].  provide support with active listening, compassion and prayer. [employer] to follow up. Signed by: Yoli Camacho       [facility] IDG Volunteer Notes by Danny Parker at 01/03/17 1124  Version 1 of 1    Author:  Danny Parker Service:  Tashia Jacobson Author Type:  Hospice Volunteer/    Filed:  01/03/17 1124 Date of Service:  01/03/17 1124 Status:  Signed    :  Danny Parker (Hospice Volunteer/)               [address] [place] [address] Interdisciplinary Plan of Care Review     Status Codes I = Initiated C=Continued R=Revised RS = Resolved     I Volunteer     Goal: Hospice house volunteer (s) enhances the quality of remaining life while patient is at the hospice house. Interventions: Starlyn Plough Starlyn Plough Arland Ahumada Volunteer (s) will provide companionship to the patient and/or family by visiting at the hospice house       . Starlyn Plough Arland Ahumada Volunteer (s) will provide respite as needed when requested by patient and/or family. Chico Grossman  Volunteer will provide activities such as music, reading, pet therapy, etc. as requested. Chico Grossman  Comfort bag delivered. Any other special requests or information regarding volunteer services:     No further needs identified at this time. These notes have been discussed in [facility] meeting.

## 2017-01-09 NOTE — [facility] IDG CHAPLAIN NOTES
Patient: Ana M Duckworth    Date: 17  Time: 9:44 AM    [facility]  Notes   provided a safe place for patient's daughter to tell their recent journey. As she began to open up  learned much more than what the patient had gone through. This  family has had many deaths. Patient is one of 10 siblings. She and only one other are left. Patient's  is . Patient has a daughter who is . In addition to the stress of patient's grand son in law has a elizabeth tumor.  offered support with compassion, empathy, listening, reflection and prayer.  also spoke to bereavement coordinator about the various grief issues this family has faced.  to continued to provide support throughout patient's stay at [facility].         Signed by: Radha Bender

## 2017-01-10 NOTE — PROGRESS NOTES
Pt medicated x2 for pain during the shift. And one time for agitation with PRN haldol. Pt  Remained alert and was able to voice some of her needs. Pt requested wet washcloths to her forehead several time during the shift. .Pt is now resting comfortably in bed. Family at bedside.

## 2017-01-10 NOTE — PROGRESS NOTES
Report taken from off-going RN. Patient resting in bed. Pt minimally lethargic but able to voice some of her needs. C/o pain at this time. Resp even and unlabored. No SOB noted. Patient identified by name and  via ID bracelet. Safety measures in place. Call bell nearby. Reminded to call for assist as needed. Verbalized understanding. Family at bedside.

## 2017-01-10 NOTE — PROGRESS NOTES
Received report from night shift RN. Patient sleeping. Verified Fentanyl 12mcg patch right upper chest with off-going RN. Identified by name and date of birth on armband. No s/sx pain, agitation, dyspnea, or N/V. Bed low and locked, side rails x2, tab alerts on, call light within reach, and door open for continuous monitoring.

## 2017-01-11 NOTE — PROGRESS NOTES
Report received and assumed care of patient. Patient has Fentanyl 12mcg patch on right upper chest. Family at bedside. Patient states she is not in any pain at this time. Prater catheter patent and draining.

## 2017-01-11 NOTE — PROGRESS NOTES
Physical assessment completed. Pt drowsy but awoke when pedal pulse was palpated. Follow up on prn medication, pt resting in bed with eyes closed, FLACC =0, no s/sx of dyspnea, agitation or n/v. Will continue to monitor.

## 2017-01-11 NOTE — PROGRESS NOTES
Report received from off-going nurse, visual identification made, assumed care of pt. Pt resting quietly with eyes closed, no agitation or restlessness, no grimacing or groaning. Pt respirations unlabored. Tab alert in place, rails up x 2, bed in lowest position, safety maintained. FLACC 0.  Daughter at bedside, states she had a bad night but has finally settled down

## 2017-01-11 NOTE — PROGRESS NOTES
Progress Note    Patient: Dragan Cornejo MRN: [id number]  SSN: [id number]    YOB: 1933  Age: 80 y.o. Sex: female      Admit Date: 1/2/2017    LOS: 9 days     Subjective:     Eating bites and sips at meals. Alert to person and denies pain, nausea or dyspnea. Has required Morphine PO x 1 and SQ x 4 for dyspnea, Haldol x 1 po and x 2 SQ for agitation. Family at bedside. Review of Systems:  See HPI    Objective:     Vitals:    01/09/17 2300 01/10/17 0507 01/11/17 0643 01/11/17 1630   BP: 120/52 147/67 137/57 173/69   Pulse: 63 83 82 78   Resp: 17 17 19 16   Temp: 98.9 °F (37.2 °C) 97.6 °F (36.4 °C) 96.5 °F (35.8 °C) 96.9 °F (36.1 °C)   Weight:       Height:            Intake and Output:  Current Shift: 01/11 0701 - 01/11 1900  In: -   Out: 700 [Urine:700]  Last three shifts: 01/09 1901 - 01/11 0700  In: -   Out: 1400 [Urine:1400]    Physical Exam:   GENERAL: Alert, cooperative, no distress  LUNG: Coarse, diminished breath sounds with unlabored respirations  HEART: regular rate and rhythm, S1, S2 normal, no murmur, click, rub or gallop  ABDOMEN: soft, distended, non-tender. Bowel sounds normal. No masses, no organomegaly  : Prater catheter with cloudy manuel urine. EXTREMITIES: extremities normal with + pedal pulses. Moderate generalized edema. SKIN: Normal. Warm to touch. NEUROLOGIC: Alert to person. Recognizes her daughter. Able to answer some questions. Lab/Data Review:  No new labs resulted in the last 24 hours.     Assessment:     Principal Problem:    Acute respiratory failure with hypoxia (HCC) (1/2/2017)        Plan:     Current Facility-Administered Medications   Medication Dose Route Frequency    fentaNYL (DURAGESIC) 12 mcg/hr patch 1 Patch  1 Patch TransDERmal Q72H    morphine injection 2 mg  2 mg SubCUTAneous Q20MIN PRN    haloperidol lactate (HALDOL) injection 2 mg  2 mg SubCUTAneous Q1H PRN    haloperidol (HALDOL) tablet 2 mg  2 mg Oral Q1H PRN    diphenhydrAMINE (BENADRYL) capsule 25 mg  25 mg Oral Q6H PRN    diphenhydrAMINE (BENADRYL) injection 25 mg  25 mg IntraMUSCular Q6H PRN    acetaminophen (TYLENOL) suppository 650 mg  650 mg Rectal Q3H PRN    acetaminophen (TYLENOL) tablet 650 mg  650 mg Oral Q4H PRN    LORazepam (ATIVAN) tablet 1 mg  1 mg Oral Q4H PRN    LORazepam (ATIVAN) injection 1 mg  1 mg IntraMUSCular Q4H PRN    bisacodyl (DULCOLAX) suppository 10 mg  10 mg Rectal PRN    haloperidol (HALDOL) tablet 2 mg  2 mg Oral Q1H PRN    haloperidol lactate (HALDOL) injection 2 mg  2 mg SubCUTAneous Q1H PRN    albuterol-ipratropium (DUO-NEB) 2.5 MG-0.5 MG/3 ML  3 mL Nebulization Q4H PRN    glycopyrrolate (ROBINUL) injection 0.2 mg  0.2 mg SubCUTAneous Q4H PRN    morphine (ROXANOL) concentrated oral syringe 5 mg  5 mg Oral Q30MIN PRN    Or    morphine (ROXANOL) concentrated oral syringe 5 mg  5 mg SubLINGual Q30MIN PRN     Admitted with acute hypoxic respiratory failure for management of dyspnea and delirium.     1. Dyspnea: Morphine as ordered. Glycopyrrolate prn secretions. Duonebs prn. Fentanyl as ordered.     2. Delirium: Haldol and Ativan as ordered.     3. Family/Pt Support: Family at bedside during exam. Medications and plan of care discussed with nursing staff and family. Will continue to monitor for symptoms and adjust medications as needed to maintain patient comfort. PPS 20%. Case discussed with Dr. Marcelina Lanes.      Signed By: Courtney Denis NP     January 11, 2017

## 2017-01-11 NOTE — PROGRESS NOTES
Pt groaning states she has pain in back, legs and states she feels short of breath, administered morphine and haldol for pain and agitation.

## 2017-01-11 NOTE — PROGRESS NOTES
Pt resting quietly with eyes closed, unlabored breathing, no agitation or restlessness, no grimacing or groaning.

## 2017-01-11 NOTE — PROGRESS NOTES
Summary Note- Patient is drowsy, yet responds appropriately to questions. Required PRN medications this shift for pain x1 and nausea x1. Tolerating a few bites and sips or ordered diet. Fentanyl 12mcg patch on right upper chest verified with on-coming RN. Patient safety maintained through hourly rounding: bed low and locked, side rails x2, tab alerts on, call light within reach, and door open for continuous monitoring except when family is present at bedside.

## 2017-01-12 NOTE — PROGRESS NOTES
Report received from off-going nurse, visual identification made, assumed care of pt. Pt resting quietly with eyes closed, no agitation or restlessness, no grimacing or groaning. Pt respirations unlabored. Tab alert in place, rails up x 2, bed in lowest position, safety maintained. FLACC 0. Daughter at bedside.

## 2017-01-12 NOTE — PROGRESS NOTES
Pt had less pain medication today then she had the previous day with only two prn morphine doses. She was more alert and answered questions appropriately for dr and NP. Pt has given a suppository and was disimpacted by the student nurse for a large hard stool. Daughter was at bedside throughout the day.

## 2017-01-12 NOTE — PROGRESS NOTES
Follow up on prn medication, pt resting in bed with eyes closed, FLACC =0, no s/sx of dyspnea, agitation or n/v. Will continue to monitor. family at bedside

## 2017-01-12 NOTE — PROGRESS NOTES
Received report and assumed care of patient. Patient's family at bedside. Patient states she is not in any pain at this time. Fentanyl patch to left chest 12 mcg. Prater cath patent and draining. Call light within reach and bed low and locked.

## 2017-01-12 NOTE — PROGRESS NOTES
01/11/17 2343   Symptom Management   Agitation Emotional liability;Moaning;Pulling at devices; Restlessness   Anxiety Counseling not adequate to calm patient   Pain Control Intractable pain requiring frequent PRN's   Delirium Hallucinations, fear, or anger   PRN given,. Please see MAR for details,.

## 2017-01-12 NOTE — PROGRESS NOTES
Patient was awake and engaging in conversation. It was great to see her smiling. Her son in law was at bedside. We had begun conversation when Shea Aguilar, LORENZO  came in and began her exam. Sarah Rader excused herself.  to continue routine visits.

## 2017-01-12 NOTE — PROGRESS NOTES
Progress Note    Patient: Ana M Duckworth MRN: [id number]  SSN: [id number]    YOB: 1933  Age: 80 y.o. Sex: female      Admit Date: 1/2/2017    LOS: 10 days     Subjective:     Eating bites and sips at meals. Alert to person and year. Denies nausea or dyspnea. Has required Morphine SQ x 8 for pain/dyspnea, Haldol x 3 SQ for agitation. Family at bedside. Review of Systems:  See HPI    Objective:     Vitals:    01/10/17 0507 01/11/17 0643 01/11/17 1630 01/12/17 0746   BP: 147/67 137/57 173/69 189/74   Pulse: 83 82 78 81   Resp: 17 19 16 16   Temp: 97.6 °F (36.4 °C) 96.5 °F (35.8 °C) 96.9 °F (36.1 °C) 97.2 °F (36.2 °C)   Weight:       Height:            Intake and Output:  Current Shift: 01/12 0701 - 01/12 1900  In: -   Out: 200 [Urine:200]  Last three shifts: 01/10 1901 - 01/12 0700  In: -   Out: 2100 [Urine:2100]    Physical Exam:   GENERAL: Alert, cooperative, no distress  LUNG: Coarse, diminished breath sounds with unlabored respirations  HEART: regular rate and rhythm, S1, S2 normal, no murmur, click, rub or gallop  ABDOMEN: soft, distended, non-tender. Bowel sounds normal. No masses, no organomegaly  : Prater catheter with cloudy manuel urine. EXTREMITIES: extremities normal with + pedal pulses. Moderate generalized edema. SKIN: Normal. Warm to touch. NEUROLOGIC: Alert to person and year. Recognizes her son-in-law. Able to answer some questions. Lab/Data Review:  No new labs resulted in the last 24 hours.     Assessment:     Principal Problem:    Acute respiratory failure with hypoxia (HCC) (1/2/2017)        Plan:     Current Facility-Administered Medications   Medication Dose Route Frequency    fentaNYL (DURAGESIC) 25 mcg/hr patch 1 Patch  1 Patch TransDERmal Q72H    morphine injection 2 mg  2 mg SubCUTAneous Q20MIN PRN    haloperidol lactate (HALDOL) injection 2 mg  2 mg SubCUTAneous Q1H PRN    haloperidol (HALDOL) tablet 2 mg  2 mg Oral Q1H PRN    diphenhydrAMINE (BENADRYL) capsule 25 mg  25 mg Oral Q6H PRN    diphenhydrAMINE (BENADRYL) injection 25 mg  25 mg IntraMUSCular Q6H PRN    acetaminophen (TYLENOL) suppository 650 mg  650 mg Rectal Q3H PRN    acetaminophen (TYLENOL) tablet 650 mg  650 mg Oral Q4H PRN    LORazepam (ATIVAN) tablet 1 mg  1 mg Oral Q4H PRN    LORazepam (ATIVAN) injection 1 mg  1 mg IntraMUSCular Q4H PRN    bisacodyl (DULCOLAX) suppository 10 mg  10 mg Rectal PRN    haloperidol (HALDOL) tablet 2 mg  2 mg Oral Q1H PRN    haloperidol lactate (HALDOL) injection 2 mg  2 mg SubCUTAneous Q1H PRN    albuterol-ipratropium (DUO-NEB) 2.5 MG-0.5 MG/3 ML  3 mL Nebulization Q4H PRN    glycopyrrolate (ROBINUL) injection 0.2 mg  0.2 mg SubCUTAneous Q4H PRN    morphine (ROXANOL) concentrated oral syringe 5 mg  5 mg Oral Q30MIN PRN    Or    morphine (ROXANOL) concentrated oral syringe 5 mg  5 mg SubLINGual Q30MIN PRN     Admitted with acute hypoxic respiratory failure for management of pain, dyspnea and delirium.     1. Pain: Increase Fentanyl to 25mcg/hour via TD patch. Morphine as ordered. 2.. Dyspnea: Morphine as ordered. Glycopyrrolate prn secretions. Duonebs prn.      3. Delirium: Haldol and Ativan as ordered.     4. Family/Pt Support: Family at bedside during exam. Medications and plan of care discussed with nursing staff and family. Will continue to monitor for symptoms and adjust medications as needed to maintain patient comfort. PPS 20%. Case discussed with Dr. Ozzie Bence.      Signed By: Marilu Silva NP     January 12, 2017

## 2017-01-12 NOTE — [facility] NOTES
Patient: Radha Timmons    Date: 01/12/17  Time: 4:41 PM    [facility]  Notes     was quite surprised with my last encounter on 11/12/17. Patient was alert and quite able to communicate. She was oriented to herself, family and surroundings. She is grateful to be awake. She says the pain medicine causes her to sleep but she knows she needs that sleep. Visit was interrupted by Forest Siddiqi NP. Forest Siddiqi wanted  to keep talking while she examined her but  felt it was better to return at a later date.  will continue to be available for support to patient and family during patient's time at [facility]   According to Forest Siddiqi NP patient has changed today and is very sleepy again with little response.          Signed by: Ava Calvillo

## 2017-01-13 NOTE — PROGRESS NOTES
Progress Note    Patient: Ana M Duckworth MRN: [id number]  SSN: [id number]    YOB: 1933  Age: 80 y.o. Sex: female      Admit Date: 1/2/2017    LOS: 11 days     Subjective:     Eating bites and sips per family. Lethargic and nonverbal. Has required Morphine PO x 1 and SQ x 5 for pain/dyspnea, Haldol x 1 SQ and Ativan po x 1 for agitation. Family at bedside. Review of Systems:  Not obtained due to patient factors. Objective:     Vitals:    01/11/17 1630 01/12/17 0746 01/12/17 1626 01/13/17 0633   BP: 173/69 189/74 147/65 (!) 60/24   Pulse: 78 81 79    Resp: 16 16 16 12   Temp: 96.9 °F (36.1 °C) 97.2 °F (36.2 °C) 96.2 °F (35.7 °C) 96.1 °F (35.6 °C)   Weight:       Height:            Intake and Output:  Current Shift:    Last three shifts: 01/11 1901 - 01/13 0700  In: -   Out: 1600 [Urine:1600]    Physical Exam:   GENERAL: Lethargic, nonverbal, no distress  LUNG: Coarse, diminished breath sounds with unlabored respirations  HEART: regular rate and rhythm, S1, S2 normal, no murmur, click, rub or gallop  ABDOMEN: soft, distended, non-tender. Bowel sounds hypoactive. : Prater catheter with cloudy manuel urine. EXTREMITIES: extremities normal with + pedal pulses. Moderate generalized edema. SKIN: Normal. Warm to touch. NEUROLOGIC: Lethargic, nonverbal.    Lab/Data Review:  No new labs resulted in the last 24 hours.     Assessment:     Principal Problem:    Acute respiratory failure with hypoxia ([facility] [address] (1/2/2017)        Plan:     Current Facility-Administered Medications   Medication Dose Route Frequency    LORazepam (ATIVAN) tablet 1 mg  1 mg SubLINGual Q4H PRN    fentaNYL (DURAGESIC) 25 mcg/hr patch 1 Patch  1 Patch TransDERmal Q72H    morphine injection 2 mg  2 mg SubCUTAneous Q20MIN PRN    haloperidol lactate (HALDOL) injection 2 mg  2 mg SubCUTAneous Q1H PRN    diphenhydrAMINE (BENADRYL) injection 25 mg  25 mg IntraMUSCular Q6H PRN    acetaminophen (TYLENOL) suppository 650 mg  650 mg Rectal Q3H PRN    LORazepam (ATIVAN) injection 1 mg  1 mg IntraMUSCular Q4H PRN    bisacodyl (DULCOLAX) suppository 10 mg  10 mg Rectal PRN    haloperidol lactate (HALDOL) injection 2 mg  2 mg SubCUTAneous Q1H PRN    albuterol-ipratropium (DUO-NEB) 2.5 MG-0.5 MG/3 ML  3 mL Nebulization Q4H PRN    glycopyrrolate (ROBINUL) injection 0.2 mg  0.2 mg SubCUTAneous Q4H PRN    morphine (ROXANOL) concentrated oral syringe 5 mg  5 mg Oral Q30MIN PRN    Or    morphine (ROXANOL) concentrated oral syringe 5 mg  5 mg SubLINGual Q30MIN PRN     Admitted with acute hypoxic respiratory failure for management of pain, dyspnea and delirium.     1. Pain: Fentanyl as ordered. Morphine as ordered. 2.. Dyspnea: Morphine as ordered. Glycopyrrolate prn secretions. Duonebs prn.      3. Delirium: Haldol and Ativan as ordered.     4. Family/Pt Support: Family at bedside during exam. Medications and plan of care discussed with nursing staff and family. Will continue to monitor for symptoms and adjust medications as needed to maintain patient comfort. PPS 20%. Case discussed with Dr. Luciana Whyte and [facility]      Signed By: Jaden Chan NP     January 13, 2017

## 2017-01-13 NOTE — [facility] IDG MASTER NOTE
[facility] Collabor[facility]  Date: 01/16/17  Time: 11:18 AM    ___________________    Patient: Lars Tucker    ___________________    Diagnoses: There were no encounter diagnoses. Current Medications:    Current Facility-Administered Medications:     morphine injection 4 mg, 4 mg, SubCUTAneous, Q30MIN PRN, 4 mg at 01/16/17 0239 **OR** [DISCONTINUED] morphine injection 2 mg, 2 mg, IntraVENous, Q20MIN PRN, Alveria Curet, NP    fentaNYL (DURAGESIC) 12 mcg/hr patch 1 Patch, 1 Patch, TransDERmal, Q72H, 1 Patch at 01/14/17 1221 **AND** fentaNYL (DURAGESIC) 25 mcg/hr patch 1 Patch, 1 Patch, TransDERmal, Q72H, John Hernandez MD, 1 Patch at 01/14/17 1221    LORazepam (ATIVAN) tablet 1 mg, 1 mg, SubLINGual, Q4H PRN, Alveria Curet, NP, 1 mg at 01/16/17 0239    haloperidol lactate (HALDOL) injection 2 mg, 2 mg, SubCUTAneous, Q1H PRN, Alveria Curet, NP, 2 mg at 01/16/17 1124    diphenhydrAMINE (BENADRYL) injection 25 mg, 25 mg, IntraMUSCular, Q6H PRN, Alveria Curet, NP    acetaminophen (TYLENOL) suppository 650 mg, 650 mg, Rectal, Q3H PRN, Alveria Curet, NP    LORazepam (ATIVAN) injection 1 mg, 1 mg, IntraMUSCular, Q4H PRN, Alveria Curet, NP, 1 mg at 01/07/17 2327    bisacodyl (DULCOLAX) suppository 10 mg, 10 mg, Rectal, PRN, Alveria Curet, NP    haloperidol lactate (HALDOL) injection 2 mg, 2 mg, SubCUTAneous, Q1H PRN, Alveria Curet, NP, 2 mg at 01/15/17 2228    albuterol-ipratropium (DUO-NEB) 2.5 MG-0.5 MG/3 ML, 3 mL, Nebulization, Q4H PRN, Alveria Curet, NP    glycopyrrolate (ROBINUL) injection 0.2 mg, 0.2 mg, SubCUTAneous, Q4H PRN, Alveria Curet, NP, 0.2 mg at 01/16/17 0840    morphine (ROXANOL) concentrated oral syringe 5 mg, 5 mg, Oral, Q30MIN PRN, 5 mg at 01/10/17 1353 **OR** morphine (ROXANOL) concentrated oral syringe 5 mg, 5 mg, SubLINGual, Q30MIN PRN, Alveria Curet, NP, 5 mg at 01/16/17 1122    Orders: Allergies:   Allergies   Allergen Reactions    Lortab [Hydrocodone-Acetaminophen] Unknown (comments) and Nausea and Vomiting       ___________________    Care Team Notes          POC/IDG Notes      [facility] IDG  Notes by Magda Flower at 01/13/17 1229  Version 1 of 1    Author:  Magda Flower Service:  Claire Malik Author Type:      Filed:  01/13/17 1235 Date of Service:  01/13/17 1229 Status:  Signed    :  Magda Flower ()           Patient: Kevin Garcia    Date: 01/13/17  Time: 12:29 PM    [facility]  Notes  LMSW will continue to provide emotional support. Pt was alert and oriented yesterday. Today she is barely opening her eyes. Family is usually at bedside. No financial concerns for family around final arrangements. Extra support to the daughter.         Signed by: Magda Flower       [facility] IDG Nurse Notes by Peggy Mcnamara at 01/13/17 1230  Version 1 of 1    Author:  Peggy Mcnamara Service:  Claire Malik Author Type:  Registered Nurse    Filed:  01/13/17 1234 Date of Service:  01/13/17 1230 Status:  Signed    :  Peggy Mcnamara (Registered Nurse)           Patient: Kevin Garcia    Date: 01/13/17  Time: 12:30 PM    [address] Nurse Notes    UPDATE: fentanyl patch increased to 25 mcg yesterday; since increase in patch, patient has required less but still requiring PRN morphine injectables for pain; continues to only take bites and sips; significantly less responsive overall than earlier this week; BP down into the 60's this morning; continues to receive PRN haldol injectables for agitation    Goals of care: will discontinue oral medications at this time; effectiveness of symptom management continuously evaluated to achieve optimum comfort and ultimately a peaceful death        Signed by: Peggy Mcnaamra       [address] ID  Notes by Manohar Stanley at 01/12/17 0131  Version 1 of 1    Author:  Manohar Stanley Service:  Spiritual Care Author Type:  Pastoral Care    Filed:  01/13/17 1233 Date of Service:  01/12/17 1641 Status:  Signed    :  Matilde Moreau (Pastoral Care)           Patient: Nicki Velarde    Date: 01/12/17  Time: 4:41 PM    [facility]  Notes     was quite surprised with my last encounter on 11/12/17. Patient was alert and quite able to communicate. She was oriented to herself, family and surroundings. She is grateful to be awake. She says the pain medicine causes her to sleep but she knows she needs that sleep. Visit was interrupted by [facility], NP. [facility] wanted  to keep talking while she examined her but  felt it was better to return at a later date.  will continue to be available for support to patient and family during patient's time at [facility]   According to [facility], NP patient has changed today and is very sleepy again with little response. Signed by: Matilde Moreau       [facility] ID Volunteer Notes by Adelina Vee at 01/13/17 1157  Version 1 of 1    Author:  Adelina Vee Service:  Stevie Heller Author Type:  Hospice Volunteer/    Filed:  01/13/17 1158 Date of Service:  01/13/17 1157 Status:  Signed    :  Adelina Vee (Hospice Volunteer/)               [facility] Interdisciplinary Plan of Care Review     Status Codes I = Initiated C=Continued R=Revised RS = Resolved     C Volunteer     Goal: Hospice house volunteer (s) enhances the quality of remaining life while patient is at the hospice house. Interventions: Amina Evans Proffer Volunteer (s) will provide companionship to the patient and/or family by visiting at the hospice house       . Amina Simmsody Proffer Volunteer (s) will provide respite as needed when requested by patient and/or family. Amina Javier Shane  Volunteer will provide activities such as music, reading, pet therapy, etc. as requested. Amina Javier Shane  Comfort bag delivered.         Any other special requests or information regarding volunteer services:   Six visits recorded for companionship, prayer, visits with family. Daughter enjoys company, per team. Volunteers notified. No further needs identified at this time. These notes have been discussed in [facility] meeting. [address] [place] [address]  Notes by Hal Mckeon at 17 6345  Version 1 of 1    Author:  Hal Mckeon Service:  Spiritual Care Author Type:  Pastoral Care    Filed:  17 1447 Date of Service:  1744 Status:  Signed    :  Hal Mckeon (Pastoral Care)           Patient: Gisell Limon    Date: 17  Time: 9:44 AM    [facility]  Notes   provided a safe place for patient's daughter to tell their recent journey. As she began to open up  learned much more than what the patient had gone through. This  family has had many deaths. Patient is one of 10 siblings. She and only one other are left. Patient's  is . Patient has a daughter who is . In addition to the stress of patient's grand son in law has a elizabeth tumor.  offered support with compassion, empathy, listening, reflection and prayer.  also spoke to bereavement coordinator about the various grief issues this family has faced.  to continued to provide support throughout patient's stay at [facility].         Signed by: Hal Mckeon       [address] [place] [address] Nurse Notes by Marilyn Elmore at 17 1442  Version 1 of 1    Author:  Marilyn Elmore Service:  Suman Vital Author Type:  Registered Nurse    Filed:  17 1445 Date of Service:  17 144 Status:  Signed    :  Marilyn Elmore (Registered Nurse)           Patient: Gisell Limon    Date: 17  Time: 2:42 PM    [facility] Nurse Notes    UPDATE: patient not eating although taking sips at times; continues to see people in the room and asking for them to \"open the gate\"; continues to require PRN morphine injectables for breakthrough pain in addition to fentanyl 12mcg patch; receiving 3-4 doses of PRN haldol injectables daily; ongoing support for patient and family    Goals of care: effectiveness of symptom management continuously evaluated to achieve optimum comfort and ultimately a peaceful death        Signed by: Marcos MONET [facility] IDG  Notes by Ekaterina Boucher at 01/09/17 1441  Version 1 of 1    Author:  Ekaterina Boucher Service:  Baldo Moran Author Type:      Filed:  01/09/17 1444 Date of Service:  01/09/17 1441 Status:  Signed    :  Ekaterina Boucher ()           Patient: Savannah Gil    Date: 01/09/17  Time: 2:41 PM    [facility]  Notes  LMSW continues to offer emotional support. No community resources have been needed for the family or the pt. Pt continues to decline. Signed by: Ekaterina Boucher       [facility] IDG Volunteer Notes by Duncan Tesfaye at 01/06/17 1301  Version 1 of 1    Author:  Duncan Tesfaye Service:  Baldo Moran Author Type:  Hospice Volunteer/    Filed:  01/06/17 1301 Date of Service:  01/06/17 1301 Status:  Signed    :  Duncan Tesfaye (Hospice Volunteer/)               [address] Review     Status Codes I = Initiated C=Continued R=Revised RS = Resolved     I Volunteer     Goal: Hospice house volunteer (s) enhances the quality of remaining life while patient is at the hospice house. Interventions: Ankita Union Ankita Union Tess Segovia Volunteer (s) will provide companionship to the patient and/or family by visiting at the hospice house       . [facility] Tess Segovia Volunteer (s) will provide respite as needed when requested by patient and/or family. [facility] [place] [facility]  Volunteer will provide activities such as music, reading, pet therapy, etc. as requested. [facility] [place] [facility]  Comfort bag delivered. Any other special requests or information regarding volunteer services: Three visits recorded for prayer and companionship. No further needs identified at this time.       These notes have been discussed in 888 [facility] meeting. [address] [place] [address] Nurse Notes by Emanuel Rivers at 17 1234  Version 1 of 1    Author:  Emanuel Rivers Service:  Ledon Kocher Author Type:  Registered Nurse    Filed:  17 1237 Date of Service:  17 1234 Status:  Signed    :  Emanuel Rivers (Registered Nurse)           Patient: Myron Freeman    Date: 17  Time: 12:34 PM    [address] [place] [address] Nurse Notes    UPDATE: increased frequency of morphine required more for pain as opposed to dyspnea since the last IDG; fentanyl patch added and PRN morphine continues to be administered to manage pain and dyspnea; delirium presenting more requiring PRN haldol to manage    Goals of care: effectiveness of symptom management continuously evaluated to achieve optimum comfort      Signed by: Emanuel Rivers       [address] [place] [address]  Notes by Cha Arce at 17 1234  Version 1 of 1    Author:  Cha Arce Service:  Ledon Kocher Author Type:  Pastoral Care    Filed:  17 1235 Date of Service:  17 1234 Status:  Signed    :  Cha Arce (Pastoral Care)           Patient: Myron Freeman    Date: 17  Time: 12:34 PM    Patient admitted on 17. Chaplain Kari Johnson and King Overton have both provided spiritual care for patient/family. Se documentation below for Chaplain Frausto's most recent report:     provided a safe place for patient's daughter to tell their recent journey. As she began to open up  learned much more than what the patient had gone through. I learned that the family has had many deaths. Patient is one of 10 siblings. She and only one other are left. Patient's  is . Patient has a daughter who is . In addition to the stress of patient's grand son in law has a elizabeth tumor. Their most resent turn of events has been the patient's decline. Patients daughter has done all that she knew possible to help her mom improve since she fell in October of last year.  The patient went to rehab but while there continued the downward spiral. Daughter was in hopes of getting her mom to a spinal doctor. 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 Her organs began to be compromised and she continued to decline until it was determined she needed comfort rather than curative care. Family is at peace with patient being at Hospice. They are assured of her eternal destination. During this visit  was able to listen and affirm daughters wonderful care, affirm her clifford and offer prayer for patient and family's journey including the grand son in law who has a brain tumor.      Following the visit  updated our Bereavement Coordinator, ARIANA Rolon Div of the family's additional stressors. Signed by: Jannette Virgen       [facility]  Notes by Simona Hurtado at 01/06/17 1231  Version 1 of 1    Author:  Simona Hurtado Service:  Tashia Jacobson Author Type:      Filed:  01/06/17 1233 Date of Service:  01/06/17 1231 Status:  Signed    :  Simona Hurtado ()           Patient: Ira Saldivar    Date: 01/06/17  Time: 12:31 PM    [facility]  Notes  Spoke to family about giving the \"LTC\" Bed  Up at [facility]. Family is ok with that. Daughter is feeling a little better with the decline of mom. Signed by: [facility]  Notes by Simona Hurtado at 01/03/17 1150  Version 1 of 1    Author:  Simona Hurtado Service:  Tashia Jacobson Author Type:      Filed:  01/03/17 1217 Date of Service:  01/03/17 1150 Status:  Signed    :  Simona Hurtado ()           Patient: Ira Saldivar    Date: 01/03/17  Time: 11:50 AM    [facility]  Notes  LMSW will visit to complete the initial assessment. The family was curious if they needed to cancel the LTC bed at [facility]. LMSW advised the family to not give up a bed for long term just yet. The family will be calling the facility to check on the patient's belonging's. She was getting rehab before going to the hospital.         Signed by: Komal Almaraz       [address] [place] [address] Nurse Notes by Marsha Marte at 01/03/17 1205  Version 1 of 1    Author:  Marsha Marte Service:  Dina Pandey Author Type:  Registered Nurse    Filed:  01/03/17 1211 Date of Service:  01/03/17 1205 Status:  Signed    :  Marsha Marte (Registered Nurse)           Patient: Lu Rodriguez    Date: 01/03/17  Time: 12:05 PM    [address] Nurse Notes    1st IDG since [facility] admission to [facility] yesterday; patient minimally responsive with the exception of yes/no questions and observations of talking with beings unable to be seen by staff about how she is \"ready to go to heaven\"; noted aspiration with oral intake despite thickened and crushed medications; patient also noted to be dyspneic with speech; receiving PRN haldol for agitation/delirium and PRN morphine for dyspnea - both alternating between crushed and injectables; SW assessment pending    Goals of care: effectiveness of symptom management continuously evaluated to achieve optimum comfort        Signed by: Marsha Marte       [address] [place] [address]  Notes by Haydee Frost at 01/03/17 1038  Version 1 of 1    Author:  Haydee Frost Service:  Spiritual Care Author Type:  Pastoral Care    Filed:  01/03/17 1200 Date of Service:  01/03/17 1038 Status:  Signed    :  Haydee Frost (Pastoral Care)           Patient: Lu Rodriguez    Date: 01/03/17  Time: 10:38 AM    [facility]  Notes    Spiritual Care assessment completed on January 2, 2017 by Alexis Swanson. Patient was initially at [facility] [place] and when she became ill was hospitalized with pneumonia and sepsis. She is now [facility] level of care. Patient is of [employer].   During 's visit family shared a touching story of how patient led her daughter to Ryan Peter is very important to this family. Family expressed feelings of peace with decisions for patient to be at [facility].  provide support with active listening, compassion and prayer. [employer] to follow up. Signed by: Morgan MONET [facility] IDG Volunteer Notes by Rafat Barber at 01/03/17 1124  Version 1 of 1    Author:  Rafat Barber Service:  Chucky Dewitt Author Type:  Hospice Volunteer/    Filed:  01/03/17 1124 Date of Service:  01/03/17 1124 Status:  Signed    :  Rafat Barber (Hospice Volunteer/)               [facility] Interdisciplinary Plan of Care Review     Status Codes I = Initiated C=Continued R=Revised RS = Resolved     I Volunteer     Goal: Hospice house volunteer (s) enhances the quality of remaining life while patient is at the hospice house. Interventions: Rayshawn Petersl Rayshawn Pickerel [facility] Volunteer (s) will provide companionship to the patient and/or family by visiting at the hospice house       . Rayshawn Pickerel [facility] Volunteer (s) will provide respite as needed when requested by patient and/or family. Rayshawn Pickerel Sissy Bound  Volunteer will provide activities such as music, reading, pet therapy, etc. as requested. Rayshawn Pickerel Sissy Bound  Comfort bag delivered. Any other special requests or information regarding volunteer services:     No further needs identified at this time. These notes have been discussed in 888 [facility] meeting.

## 2017-01-13 NOTE — [facility] IDG VOLUNTEER NOTES
[address] Review     Status Codes I = Initiated C=Continued R=Revised RS = Resolved     C Volunteer     Goal: Hospice house volunteer (s) enhances the quality of remaining life while patient is at the hospice house. Interventions: Pilar Rosenberg Peter Volunteer (s) will provide companionship to the patient and/or family by visiting at the hospice house       . Pilar Green Volunteer (s) will provide respite as needed when requested by patient and/or family. Pilar Gonsalves  Volunteer will provide activities such as music, reading, pet therapy, etc. as requested. Pilar Gonsalves  Comfort bag delivered. Any other special requests or information regarding volunteer services:   Six visits recorded for companionship, prayer, visits with family. Daughter enjoys company, per team. Volunteers notified. No further needs identified at this time. These notes have been discussed in 888 [facility] meeting.

## 2017-01-13 NOTE — [facility] IDG SOCIAL WORKER NOTES
Patient: Dragan Cornejo    Date: 01/13/17  Time: 12:29 PM    \[facility]\""  Notes  LMSW will continue to provide emotional support. Pt was alert and oriented yesterday. Today she is barely opening her eyes. Family is usually at bedside. No financial concerns for family around final arrangements. Extra support to the daughter.         Signed by: Dusty

## 2017-01-13 NOTE — PROGRESS NOTES
pts daughter report that pt has been asleep since medication was given , pt appears to have no pain .  flacc 0/10

## 2017-01-13 NOTE — PROGRESS NOTES
Report taken from off going rn. Pt identified. Pt in bed with eyes closed. Pt displaying no signs or symptoms of pain, dyspnea, agitation,nausea, or vomiting. Flacc 0/10. Bed locked and low, side rails up, tabs/bed alarm in place, call light with in reach, and door opened for continued monitoring.

## 2017-01-13 NOTE — [facility] IDG NURSE NOTES
Patient: Hasmukh Wilson    Date: 01/13/17  Time: 12:30 PM    [facility] Nurse Notes    UPDATE: fentanyl patch increased to 25 mcg yesterday; since increase in patch, patient has required less but still requiring PRN morphine injectables for pain; continues to only take bites and sips; significantly less responsive overall than earlier this week; BP down into the 60's this morning; continues to receive PRN haldol injectables for agitation    Goals of care: will discontinue oral medications at this time; effectiveness of symptom management continuously evaluated to achieve optimum comfort and ultimately a peaceful death        Signed by: Blair Mascorro

## 2017-01-13 NOTE — PROGRESS NOTES
ID, bedside report. RR 18, easy, regular. Family at the bedside report her to be restful. FLACC 0. She does not open her eyes to conversation in the room.

## 2017-01-13 NOTE — PROGRESS NOTES
Pt appears to be sleep , daughter stated that she is beginning to wake up.  Pt appears to be fine with no need or signs of distress

## 2017-01-13 NOTE — PROGRESS NOTES
Despite repositioning and environmental restfulness, she is somewhat agitated. She verbalizes pain her arms, legs and back. Medicated by student. Family remains at the bedside.

## 2017-01-14 NOTE — ROUTINE PROCESS
Pt I'd by name and . Pt c/o back and leg pain. Flacc =6. Morphine 2mg sq given in left upper arm. Resp non labored on RA. Lungs diminished. BS diminished. HR reg. No edema noted at this time. Prater cath draining cloudy manuel urine. SR up x2. Bed low/locked. Call light with in reach. Door opened. Family at the bedside.

## 2017-01-14 NOTE — ROUTINE PROCESS
Pt c/o overall pain. Rates 5/10 scale. Morphine 2mg sq given in left upper arm. Pt repositioned. Resp non labored on RA. SR up x2. Bed low/locked. Call light with in reach. Family at the bedside.

## 2017-01-14 NOTE — ROUTINE PROCESS
Pt lying in bed quietly with eyes closed. Calm. No distress. No facial grimace. Flacc =0-1. Resp non labored on RA at this time. Fentanyl 25mcg/hr patch intact on right chest. SR up x2. Bed low/locked. Call light with in reach. Family at the bedside.

## 2017-01-14 NOTE — ROUTINE PROCESS
Pt awake. Facial grimace noted. Pt states she has back/leg pain 3/10 scale. Morphine 2mg sq in left upper arm given. Resp non labored on RA. SR up x2. Bed low/loc ked. Call light with in reach. Family at the bedside.

## 2017-01-14 NOTE — ROUTINE PROCESS
Pt awake. C/O back and leg pain 6/10 scale. Morphine 2mg sq in left upper arm given. Pt tolerated well. Reso non labored on RA. SR up x2. Bed low/locked. Call light with in reach. Family at the bedside.

## 2017-01-14 NOTE — PROGRESS NOTES
FLACC 0. Skin cool, dry. RR 16, easy. Family report her to be resting comfortably. I verify that a minimum of hourly rounds have been provided for this patient during this shift. Meds: Morphine x 4 for pain, with moderate control s/s. Family/Education: Pt and family involved in discussion regarding skin health and her resistance to any position but semi fernandez's. Family expresses their desire to do \"whatever she wants\" and the patient declines to lay in any other fashion. Oral intake:  Niblbles and sips of insignificant amounts. New problems/issues:  None    Summary/general care: Mabeline Sink Continues to be total care requiring not infrequent dosing for pain control. Family provides 24/7 bedside companionship, but does not participate in care. Pt takes minimal PO intake.

## 2017-01-14 NOTE — ROUTINE PROCESS
Pt restless. No facial grimace. Haldol 2mg sq given in left upper arm. Pt tolerated well. Pt repositioned. SR up x2. Bed low/locked. Call light with in reach. Family at the bedside.

## 2017-01-14 NOTE — PROGRESS NOTES
Restful. RR 16, easy. SKin warm, dry pale. Family at the bedside. Pt does not open her eyes, or respond to conversation in the room. FLACC 0.

## 2017-01-14 NOTE — ROUTINE PROCESS
Pt resting comfortably. No distress. No facial grimace. Flacc =0-1. Resp non labored on RA. SR up x2. Bed low/locked. Call light with in reach. Family at the bedside.

## 2017-01-14 NOTE — PROGRESS NOTES
Pt c/o pain to her back, legs and arms. She is unable to assign a number. Skin cool, pale. RR 18, easy. Family remains at the bedside. Pt declines repositioning despite our conversation that it may aid in her comfort and certainly keep her skin healthier. Medicated.

## 2017-01-15 NOTE — PROGRESS NOTES
Assessment, ESAS, fall risk, ana completed. She allows us to roll her to the side for seconds, and return her to her back. She tolerates this poorly, becomes tearful and her daughter becomes quite anxious. Left to rest after she is positioned to her comfort. All considerations for level III fall risk initiated/continue. SRs up x 2. Call bell within reach. x__Assess for environmental safety  _x_Ensure frequently used items are in reach of the patient  _x_Place bed in lowest position with wheel locked  __Check footwear for slip resistance and proper fit  _x_Assess effect to patient with medication changes, diuretic and narcotic use  x__Assess need for equipment: bedside commode, urinal, bedpan, etc.  x__Assess and managed pain and symptoms  _x_Assess patients cognition and ability to follow instructions. [place] patient to surroundings.   x__Educate patient and family related to safety techniques  _x_Provide lighting at all times  __Use floor mats while patient in bed  x__Use tab or bed alarm at all times  _x_Position patient for comfort  _x_Assess fall trends  x__Assess for need of physical therapy  _x_Assess need of volunteer or family to sit with patient  x__Evaluate need for change in medications and other treatments  x__Encourage family to provide close supervision while transferring, ambulation, toileting, etc and to request assistance as needed  _x_Assess the need for patient to be close to nurses station  _x_Leave patient door open while unattended by family or staff  _x_Educate patient / family of no restraint policy and interventions to promote safety

## 2017-01-15 NOTE — ROUTINE PROCESS
Pt restless. Facial grimace. Flacc =4. Haldol 2mg and morphine 4mg sq in left upper arm given. Pt repositioned by CNA. Resp non labored on RA. Fentanyl 25 mcg/hr and 12mcg/hr patch intact on Left chest. SR up x2. Bed low/locked. Call light with in reach. Family at the bedside.

## 2017-01-15 NOTE — PROGRESS NOTES
FLACC 0.  RR 16i, easy. Eyes closed. Her daughter states Adrian Fleischer hasn't moved. . She's resting so well\". Pt does not respond to conversation in the room.

## 2017-01-15 NOTE — ROUTINE PROCESS
Daughter calling stating pt is having pain and restless. Pt has facial grimace. Flacc = 3. Morphine 4mg and haldol 2mg sq given in left upper arm. Resp non labored on RA. Pt repositioned. SR  Up x2. Bed low/locked. Call light with in reach. Family at the bedside.

## 2017-01-15 NOTE — PROGRESS NOTES
Medicated for statement of back and leg pain. Declines, adamantly, repositioning and we revisit the discussion regarding skin health and pressure sores. She declines after verbalizing understanding and her daughter is in agreement.

## 2017-01-15 NOTE — PROGRESS NOTES
FLACC 0.  RR 14, easy. I verify that a minimum of hourly rounds have been provided for this patient during this shift. Meds: Increase in morphine dose seems to have brought considerably better pain control. Fentanyl also increased to 37 mcg and family educated regarding both. Pt and family request injectable stating \"the oral doesn't work\"    Family/Education: see above    Oral intake:  Nibbles and sips    New problems/issues:  Pain management required increase. Summary/general care:  Remains total care, but awake and able to communicate. She takes very little to zero oral intake. Urine output decreasing and urine is concentrated.

## 2017-01-15 NOTE — ROUTINE PROCESS
Pt resting comfortably. No facial grimace. Flacc =0-1. Resp non labored omn RA. SR up x2. Bed low/locked. Call light with in reach. Family at the bedside.

## 2017-01-15 NOTE — PROGRESS NOTES
Rounded on pt and family, no current needs. No signs or symptoms of respiratory distress, pain, or agitation.

## 2017-01-15 NOTE — ROUTINE PROCESS
Daughter calling stating pt is having pain. Pt has facial grimace. Flacc = 3. Morphine 4mg sq given in left upper arm. Resp non labored on RA. Pt repositioned. SR  Up x2. Bed low/locked. Call light with in reach. Family at the bedside.

## 2017-01-15 NOTE — PROGRESS NOTES
Pt states her pain continues. She verbalizes \"afraid the medication doesn't work anymore. Medicated, reassured. She again declines repositioning.

## 2017-01-15 NOTE — PROGRESS NOTES
Pt restful. RR 16, easy. Her daughter Martha Grew, and reports she had a poor night's rest due to pain. Bedside report rec'd. Pt does not respond to activity in the room. RR 16, easy.    FLACC 0

## 2017-01-15 NOTE — PROGRESS NOTES
Medicated. Pt states her pain is \"coming back but not like before\". She declines repositioning. Daughter remains at the bedside.

## 2017-01-15 NOTE — PROGRESS NOTES
Dr. Avalos Bring consulted for pain control, order rec'd. Daughter and patient aware of the plan to increase both the fentanyl and morphine and are agreeable. Medicated. Room remains quiet, restful. Her daughter is at the bedside. RR 20.

## 2017-01-15 NOTE — ROUTINE PROCESS
Pt I'd by name and . Pt restless. Facial grimace. Flacc =3. Morphine 2mg and haldol 2mg sq given in left upper arm. Resp non labored on RA. Lungs diminished. BS diminished. HR irreg. Edema 3+ in BLE and 2+ in BUE  noted at this time. Prater cath draining cloudy manuel urine. SR up x2. Bed low/locked. Call light with in reach. Family at the bedside.

## 2017-01-16 NOTE — [facility] IDG MASTER NOTE
[facility]osp[facility] Inter[facility] Group Collaborative  Date: 01/16/17  Time: 11:55 AM    ___________________    Patient: Randall Evans    ___________________    Diagnoses: There were no encounter diagnoses. Current Medications:    Current Facility-Administered Medications:     fentaNYL (DURAGESIC) 50 mcg/hr patch 1 Patch, 1 Patch, TransDERmal, Q72H, Henagar Tete, NP    morphine injection 4 mg, 4 mg, SubCUTAneous, Q30MIN PRN, 4 mg at 01/16/17 0239 **OR** [DISCONTINUED] morphine injection 2 mg, 2 mg, IntraVENous, Q20MIN PRN, Reji Tete, NP    LORazepam (ATIVAN) tablet 1 mg, 1 mg, SubLINGual, Q4H PRN, Henagar Tete, NP, 1 mg at 01/16/17 0239    haloperidol lactate (HALDOL) injection 2 mg, 2 mg, SubCUTAneous, Q1H PRN, Henagar Tete, NP, 2 mg at 01/16/17 1124    diphenhydrAMINE (BENADRYL) injection 25 mg, 25 mg, IntraMUSCular, Q6H PRN, Reji Tete, NP    acetaminophen (TYLENOL) suppository 650 mg, 650 mg, Rectal, Q3H PRN, Henagar Tete, NP    LORazepam (ATIVAN) injection 1 mg, 1 mg, IntraMUSCular, Q4H PRN, Henagar Tete, NP, 1 mg at 01/07/17 2327    bisacodyl (DULCOLAX) suppository 10 mg, 10 mg, Rectal, PRN, Henagar Tete, NP    haloperidol lactate (HALDOL) injection 2 mg, 2 mg, SubCUTAneous, Q1H PRN, Henagar Tete, NP, 2 mg at 01/15/17 2228    albuterol-ipratropium (DUO-NEB) 2.5 MG-0.5 MG/3 ML, 3 mL, Nebulization, Q4H PRN, Reji Tete, NP    glycopyrrolate (ROBINUL) injection 0.2 mg, 0.2 mg, SubCUTAneous, Q4H PRN, Reji Tete, NP, 0.2 mg at 01/16/17 0840    Orders: Allergies:   Allergies   Allergen Reactions    Lortab [Hydrocodone-Acetaminophen] Unknown (comments) and Nausea and Vomiting       ___________________    Care Team Notes          POC/IDG Notes      hospitals IDG  Notes by Damon Briones at 01/16/17 1407  Version 1 of 1    Author:  Damon Briones Service:  Spiritual Care Author Type:  Pastoral Care    Filed:  01/16/17 1412 Date of Service:  01/16/17 1407 Status:  Signed :  Santhosh Jain (Pastoral Care)           Patient: Mrak Moore    Date: 01/16/17  Time: 2:07 PM    [facility]  Notes     continues to be available for patient and family. Patient's daughter continues to be at the bedside faithfully. Her  gives her a break at times. Family feels mom is ready to go   Be with God. Daughter is at peace with mom's impending death. Patient waxes and wanes. During last encounter patient was awake and communicating. Signed by: Santhosh Jain       [facility] IDG Nurse Notes by Kayla Carlson at 01/16/17 1408  Version 1 of 1    Author:  Kalya Carlson Service:  Ignacio Farah Author Type:  Registered Nurse    Filed:  01/16/17 1411 Date of Service:  01/16/17 1408 Status:  Signed    :  Kayla Carlson (Registered Nurse)           Patient: Mark Moore    Date: 01/16/17  Time: 2:08 PM    [facility] Nurse Notes    UPDATE: fentanyl patch increased to 37.5 mcg over the weekend related to increased use of PRN medications; despite change, continues to receive a number of PRN injectable morphine doses to manage pain; continues receiving PRN injectable haldol for agitation and nausea - last episode of vomiting this morning; now receiving PRN glyco for secretions; ongoing family support     Goals of care: d/c roxanol; titrate fentanyl patch to 50 mcg to better manage pain with goal to minimize PRN injections; effectiveness of symptom management continuously evaluated to achieve optimum comfort and ultimately a peaceful death      Signed by: Kayla Carlson       [facility] IDG  Notes by Melisa Guillen at 01/16/17 1407  Version 1 of 1    Author:  Melisa Guillen Service:  Ignacio Farah Author Type:      Filed:  01/16/17 1409 Date of Service:  01/16/17 1407 Status:  Signed    :  Melisa Guillen ()           Patient: Mark Moore    Date: 01/16/17  Time: 2:07 PM    [facility]  Notes  Daughter Shayne Gallo at bedside.   She vomited this am.  Family is always at bedside. LMSW continues to visit to provide emotional support, active listening and reassurance. Signed by: Jordin Marcano       [facility] IDG  Notes by Jordin Marcano at 01/13/17 1229  Version 1 of 1    Author:  Jordin Marcano Service:  Mary Tucker Author Type:      Filed:  01/13/17 1235 Date of Service:  01/13/17 1229 Status:  Signed    :  Jordin Marcano ()           Patient: Randall Evans    Date: 01/13/17  Time: 12:29 PM    [facility]  Notes  LMSW will continue to provide emotional support. Pt was alert and oriented yesterday. Today she is barely opening her eyes. Family is usually at bedside. No financial concerns for family around final arrangements. Extra support to the daughter.         Signed by: Jordin Marcano       [facility] IDG Nurse Notes by Jaylene Jhaveri at 01/13/17 1230  Version 1 of 1    Author:  Jaylene Jhaveri Service:  Mary Tucker Author Type:  Registered Nurse    Filed:  01/13/17 1234 Date of Service:  01/13/17 1230 Status:  Signed    :  Jaylene Jhaveri (Registered Nurse)           Patient: Randall Evans    Date: 01/13/17  Time: 12:30 PM    [facility] Nurse Notes    UPDATE: fentanyl patch increased to 25 mcg yesterday; since increase in patch, patient has required less but still requiring PRN morphine injectables for pain; continues to only take bites and sips; significantly less responsive overall than earlier this week; BP down into the 60's this morning; continues to receive PRN haldol injectables for agitation    Goals of care: will discontinue oral medications at this time; effectiveness of symptom management continuously evaluated to achieve optimum comfort and ultimately a peaceful death        Signed by: Jaylene Jhaveri       [facility] IDG  Notes by Damon Briones at 01/12/17 1641  Version 1 of 1    Author:  Damon Briones Service:  Spiritual Care Author Type:  Pastoral Care    Filed:  01/13/17 1233 Date of Service:  01/12/17 1641 Status:  Signed    :  Gavino Napoles (Pastoral Care)           Patient: Lori Ennis    Date: 01/12/17  Time: 4:41 PM    [facility]  Notes     was quite surprised with my last encounter on 11/12/17. Patient was alert and quite able to communicate. She was oriented to herself, family and surroundings. She is grateful to be awake. She says the pain medicine causes her to sleep but she knows she needs that sleep. Visit was interrupted by Kris Sanchez NP. Kris Sanchez wanted  to keep talking while she examined her but  felt it was better to return at a later date.  will continue to be available for support to patient and family during patient's time at [facility]   According to Kris Sanchez NP patient has changed today and is very sleepy again with little response. Signed by: Gavino Napoles       [facility] IDG Volunteer Notes by Varun Dalton at 01/13/17 1157  Version 1 of 1    Author:  Varun Dalton Service:  Ayaka Prasad Author Type:  Hospice Volunteer/    Filed:  01/13/17 1158 Date of Service:  01/13/17 1157 Status:  Signed    :  Varun Dalton (Hospice Volunteer/)               [facility] Interdisciplinary Plan of Care Review     Status Codes I = Initiated C=Continued R=Revised RS = Resolved     C Volunteer     Goal: Hospice house volunteer (s) enhances the quality of remaining life while patient is at the hospice house. Interventions: Jessika Meek Volunteer (s) will provide companionship to the patient and/or family by visiting at the hospice house       . Jessika Meek Volunteer (s) will provide respite as needed when requested by patient and/or family. Jessika Hawkins  Volunteer will provide activities such as music, reading, pet therapy, etc. as requested. Jessika Hawkins  Comfort bag delivered.         Any other special requests or information regarding volunteer services:   Six visits recorded for companionship, prayer, visits with family. Daughter enjoys company, per team. Volunteers notified. No further needs identified at this time. These notes have been discussed in [facility] meeting. [address] [place] [address]  Notes by Mariaa Mullins at 17 [phone]  Version 1 of 1    Author:  Mariaa Mullins Service:  Spiritual Care Author Type:  Pastoral Care    Filed:  17 1447 Date of Service:  1744 Status:  Signed    :  Mariaa Mullins (Pastoral Care)           Patient: Malick Triana    Date: 17  Time: 9:44 AM    [facility]  Notes   provided a safe place for patient's daughter to tell their recent journey. As she began to open up  learned much more than what the patient had gone through. This  family has had many deaths. Patient is one of 10 siblings. She and only one other are left. Patient's  is . Patient has a daughter who is . In addition to the stress of patient's grand son in law has a elizabeth tumor.  offered support with compassion, empathy, listening, reflection and prayer.  also spoke to bereavement coordinator about the various grief issues this family has faced.  to continued to provide support throughout patient's stay at [facility].         Signed by: Mariaa Mullins       [address] [facility] Nurse Notes by Sujey Benavidez at 17 1442  Version 1 of 1    Author:  Sujey Benavidez Service:  Caro Woods Author Type:  Registered Nurse    Filed:  17 1445 Date of Service:  17 144 Status:  Signed    :  Sujey Benavidez (Registered Nurse)           Patient: Malick Triana    Date: 17  Time: 2:42 PM    [facility] Nurse Notes    UPDATE: patient not eating although taking sips at times; continues to see people in the room and asking for them to \"open the gate\"; continues to require PRN morphine injectables for breakthrough pain in addition to fentanyl 12mcg patch; receiving 3-4 doses of PRN haldol injectables daily; ongoing support for patient and family    Goals of care: effectiveness of symptom management continuously evaluated to achieve optimum comfort and ultimately a peaceful death        Signed by: Kaylah MONET [facility] IDG  Notes by Missy Cortes at 01/09/17 1441  Version 1 of 1    Author:  Missy Cortes Service:  Rene Bettencourt Author Type:      Filed:  01/09/17 1444 Date of Service:  01/09/17 1441 Status:  Signed    :  Missy Cortes ()           Patient: Lars Tucker    Date: 01/09/17  Time: 2:41 PM    [facility]  Notes  LMSW continues to offer emotional support. No community resources have been needed for the family or the pt. Pt continues to decline. Signed by: Missy Cortes       [facility] IDG Volunteer Notes by Neo Abmriz at 01/06/17 1301  Version 1 of 1    Author:  Neo Ambriz Service:  Rene Bettencourt Author Type:  Hospice Volunteer/    Filed:  01/06/17 1301 Date of Service:  01/06/17 1301 Status:  Signed    :  Neo Ambriz (Hospice Volunteer/)               [address] Review     Status Codes I = Initiated C=Continued R=Revised RS = Resolved     I Volunteer     Goal: Hospice house volunteer (s) enhances the quality of remaining life while patient is at the hospice house. Interventions: Zack Nicholson Volunteer (s) will provide companionship to the patient and/or family by visiting at the hospice house       . Zack Nicholson Volunteer (s) will provide respite as needed when requested by patient and/or family. Zack Buckner  Volunteer will provide activities such as music, reading, pet therapy, etc. as requested. Zack Buckner  Comfort bag delivered. Any other special requests or information regarding volunteer services: Three visits recorded for prayer and companionship.  No further needs identified at this time.      These notes have been discussed in 888 [facility] meeting. [address] [place] [address] ID Nurse Notes by Lanre Mckeon at 17 1234  Version 1 of 1    Author:  Lanre Mckeon Service:  Stevie Heller Author Type:  Registered Nurse    Filed:  17 1237 Date of Service:  17 1234 Status:  Signed    :  Lanre Mckeon (Registered Nurse)           Patient: Nicki Velarde    Date: 17  Time: 12:34 PM    [address] [place] [address] Nurse Notes    UPDATE: increased frequency of morphine required more for pain as opposed to dyspnea since the last IDG; fentanyl patch added and PRN morphine continues to be administered to manage pain and dyspnea; delirium presenting more requiring PRN haldol to manage    Goals of care: effectiveness of symptom management continuously evaluated to achieve optimum comfort      Signed by: Lanre Mckeon       [address] [place] [address] ID  Notes by Chelsy Torres at 17 1234  Version 1 of 1    Author:  Chelsy Torres Service:  Stevie Heller Author Type:  Pastoral Care    Filed:  17 1235 Date of Service:  17 1234 Status:  Signed    :  Chelsy Torres (Pastoral Care)           Patient: Nicki Velarde    Date: 17  Time: 12:34 PM    Patient admitted on 17.  [facility] and Colleen Becerril have both provided spiritual care for patient/family. Se documentation below for Chaplain Frausto's most recent report:     provided a safe place for patient's daughter to tell their recent journey. As she began to open up  learned much more than what the patient had gone through. I learned that the family has had many deaths. Patient is one of 10 siblings. She and only one other are left. Patient's  is . Patient has a daughter who is . In addition to the stress of patient's grand son in law has a elizabeth tumor. Their most resent turn of events has been the patient's decline.  Patients daughter has done all that she knew possible to help her mom improve since she fell in  last year. The patient went to rehab but while there continued the downward spiral. Daughter was in hopes of getting her mom to a spinal doctor. 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 Her organs began to be compromised and she continued to decline until it was determined she needed comfort rather than curative care. Family is at peace with patient being at Hospice. They are assured of her eternal destination. During this visit  was able to listen and affirm daughters wonderful care, affirm her clifford and offer prayer for patient and family's journey including the grand son in law who has a brain tumor.      Following the visit  updated our Bereavement Coordinator, ARIANA Medina Div of the family's additional stressors. Signed by: Mckayla Garcia       [facility]  Notes by Issa Salinas at 01/06/17 1231  Version 1 of 1    Author:  Issa Salinas Service:  Jess Gilmore Author Type:      Filed:  01/06/17 1233 Date of Service:  01/06/17 1231 Status:  Signed    :  Issa Salinas ()           Patient: Josephine Wild    Date: 01/06/17  Time: 12:31 PM    [facility]  Notes  Spoke to family about giving the \"LTC\" Bed  Up at [facility]. Family is ok with that. Daughter is feeling a little better with the decline of mom. Signed [facility]: [facility]  Notes by Issa Salinas at 01/03/17 1150  Version 1 of 1    Author:  Issa Salinas Service:  Jess Gilmore Author Type:      Filed:  01/03/17 1217 Date of Service:  01/03/17 1150 Status:  Signed    :  Issa Salinas ()           Patient: Josephine Wild    Date: 01/03/17  Time: 11:50 AM    [facility]  Notes  LMSW will visit to complete the initial assessment.   The family was curious if they needed to cancel the LTC bed at [facility]. LMSW advised the family to not give up a bed for long term just yet. The family will be calling the facility to check on the patient's belonging's. She was getting rehab before going to the hospital.         Signed by: Shira Orozco       [address] [place] [address] ID Nurse Notes by Sukhwinder Trejo at 01/03/17 1205  Version 1 of 1    Author:  Sukhwinder Trejo Service:  Sandie Briseno Author Type:  Registered Nurse    Filed:  01/03/17 1211 Date of Service:  01/03/17 1205 Status:  Signed    :  Sukhwinder Trejo (Registered Nurse)           Patient: Joseph Wong    Date: 01/03/17  Time: 12:05 PM    [address] [facility] [address] Nurse Notes    1st IDG since [facility] admission to [facility] yesterday; patient minimally responsive with the exception of yes/no questions and observations of talking with beings unable to be seen by staff about how she is \"ready to go to heaven\"; noted aspiration with oral intake despite thickened and crushed medications; patient also noted to be dyspneic with speech; receiving PRN haldol for agitation/delirium and PRN morphine for dyspnea - both alternating between crushed and injectables; SW assessment pending    Goals of care: effectiveness of symptom management continuously evaluated to achieve optimum comfort        Signed by: Sukhwinder Trejo       [address] [place] [address]  Notes by Radha Leal at 01/03/17 1038  Version 1 of 1    Author:  Radha Leal Service:  Spiritual Care Author Type:  Pastoral Care    Filed:  01/03/17 1200 Date of Service:  01/03/17 1038 Status:  Signed    :  Radha Leal (Pastoral Care)           Patient: Joseph Wong    Date: 01/03/17  Time: 10:38 AM    [facility]  Notes    Spiritual Care assessment completed on January 2, 2017 by Brayan Cooley. Patient was initially at [facility] and when she became ill was hospitalized with pneumonia and sepsis. She is now [facility] level of care. Patient is of R.RWolfgang Gallagher.   During 's visit family shared a touching story of how patient led her daughter to Denita Ly is very important to this family. Family expressed feelings of peace with decisions for patient to be at [facility].  provide support with active listening, compassion and prayer. [employer] to follow up. Signed by: Matilde Moreau       [address] [place] [address] Volunteer Notes by Adelina Vee at 01/03/17 1124  Version 1 of 1    Author:  Adelina Vee Service:  Stevie Heller Author Type:  Hospice Volunteer/    Filed:  01/03/17 1124 Date of Service:  01/03/17 1124 Status:  Signed    :  Adelina Vee (Hospice Volunteer/)               [address] [facility] Interdisciplinary Plan of Care Review     Status Codes I = Initiated C=Continued R=Revised RS = Resolved     I Volunteer     Goal: Hospice house volunteer (s) enhances the quality of remaining life while patient is at the hospice house. Interventions: Amina Evans Proffer Volunteer (s) will provide companionship to the patient and/or family by visiting at the hospice house       . Amina Evans Proffer Volunteer (s) will provide respite as needed when requested by patient and/or family. Amina Javier Shane  Volunteer will provide activities such as music, reading, pet therapy, etc. as requested. Amina Javier Shane  Comfort bag delivered. Any other special requests or information regarding volunteer services:     No further needs identified at this time. These notes have been discussed in 888 [facility] meeting.

## 2017-01-16 NOTE — PROGRESS NOTES
Report taken from off going nurse, safety round completed, patient identified by name and . Patient resting in bed, denies pain, dyspnea, agitation or n/v. Fentanyl patches intact, family at bed side, tab alarm on, call light within reach, bed lowered and locked, continue monitoring.

## 2017-01-16 NOTE — [facility] IDG NURSE NOTES
Patient: Danielle Bustos    Date: 01/16/17  Time: 2:08 PM    [facility] Nurse Notes    UPDATE: fentanyl patch increased to 37.5 mcg over the weekend related to increased use of PRN medications; despite change, continues to receive a number of PRN injectable morphine doses to manage pain; continues receiving PRN injectable haldol for agitation and nausea - last episode of vomiting this morning; now receiving PRN glyco for secretions; ongoing family support     Goals of care: d/c roxanol; titrate fentanyl patch to 50 mcg to better manage pain with goal to minimize PRN injections; effectiveness of symptom management continuously evaluated to achieve optimum comfort and ultimately a peaceful death      Signed by: Guillermina Torres

## 2017-01-16 NOTE — PROGRESS NOTES
Progress Note    Patient: Maria Fernanda Ceballos MRN: [id number]  SSN: [id number]    YOB: 1933  Age: 80 y.o. Sex: female      Admit Date: 1/2/2017    LOS: 14 days     Subjective:     Eating bites and sips per family. Drowsy, oriented to self. Has required Morphine PO x 2 and SQ x 7 for pain/dyspnea, Glycopyrrolate x 1 for secretions, Haldol x 1 SQ and Ativan po x 1 for agitation. Has required Haldol x 1 SQ this am for nausea and vomiting after breakfast this morning. Family at bedside. Review of Systems:  Denies dyspnea. C/o nausea and generalized pain. Objective:     Vitals:    01/14/17 1718 01/15/17 0453 01/15/17 1629 01/16/17 0803   BP: 121/49 111/55 103/45 109/49   Pulse: 64 75 70 75   Resp: 13 17 17 18   Temp: 97.6 °F (36.4 °C) 97.1 °F (36.2 °C) 96.3 °F (35.7 °C) 96.8 °F (36 °C)   Weight:       Height:            Intake and Output:  Current Shift:    Last three shifts: 01/14 1901 - 01/16 0700  In: -   Out: 300 [Urine:300]    Physical Exam:   GENERAL: Drowsy, oriented to self, mild distress  LUNG: Coarse, diminished breath sounds with unlabored respirations  HEART: regular rate and rhythm, S1, S2 normal, no murmur, click, rub or gallop  ABDOMEN: soft, distended, non-tender. Bowel sounds hypoactive. : Prater catheter with cloudy manuel urine. EXTREMITIES: extremities normal with + pedal pulses. Mild generalized edema. SKIN: Normal. Warm to touch. NEUROLOGIC: Drowsy and oriented to self. Able to answer simple questions. Lab/Data Review:  No new labs resulted in the last 24 hours.     Assessment:     Principal Problem:    Acute respiratory failure with hypoxia (HCC) (1/2/2017)        Plan:     Current Facility-Administered Medications   Medication Dose Route Frequency    fentaNYL (DURAGESIC) 50 mcg/hr patch 1 Patch  1 Patch TransDERmal Q72H    morphine injection 4 mg  4 mg SubCUTAneous Q30MIN PRN    LORazepam (ATIVAN) tablet 1 mg  1 mg SubLINGual Q4H PRN    haloperidol lactate (HALDOL) injection 2 mg  2 mg SubCUTAneous Q1H PRN    diphenhydrAMINE (BENADRYL) injection 25 mg  25 mg IntraMUSCular Q6H PRN    acetaminophen (TYLENOL) suppository 650 mg  650 mg Rectal Q3H PRN    LORazepam (ATIVAN) injection 1 mg  1 mg IntraMUSCular Q4H PRN    bisacodyl (DULCOLAX) suppository 10 mg  10 mg Rectal PRN    haloperidol lactate (HALDOL) injection 2 mg  2 mg SubCUTAneous Q1H PRN    albuterol-ipratropium (DUO-NEB) 2.5 MG-0.5 MG/3 ML  3 mL Nebulization Q4H PRN    glycopyrrolate (ROBINUL) injection 0.2 mg  0.2 mg SubCUTAneous Q4H PRN     Admitted with acute hypoxic respiratory failure for management of pain, dyspnea and delirium.     1. Pain: Fentanyl patch increased to 50mcg/hour. Morphine as ordered. 2.. Dyspnea: Morphine as ordered. Glycopyrrolate prn secretions. Duonebs prn.      3. Delirium: Haldol and Ativan as ordered.     4. Family/Pt Support: Family at bedside during exam. Medications and plan of care discussed with nursing staff and family. Will continue to monitor for symptoms and adjust medications as needed to maintain patient comfort. PPS 20%. Case discussed with Dr. Sarah Cantu and [facility] today.      Signed By: Rafaela Sacks, NP     January 16, 2017

## 2017-01-16 NOTE — [facility] IDG SOCIAL WORKER NOTES
Patient: Ana Tesfaye    Date: 01/16/17  Time: 2:07 PM    [address] [place] [address]  Notes  Daughter Quinn Grande at bedside. She vomited this am.  Family is always at bedside. LMSW continues to visit to provide emotional support, active listening and reassurance.         Signed by: Reese Colmenares

## 2017-01-16 NOTE — [facility] NOTES
Patient: Kevin Garcia    Date: 01/16/17  Time: 2:07 PM    [facility] [place]  Notes     continues to be available for patient and family. Patient's daughter continues to be at the bedside faithfully. Her  gives her a break at times. Family feels mom is ready to go   Be with God. Daughter is at peace with mom's impending death. Patient waxes and wanes. During last encounter patient was awake and communicating.        Signed by: Manohar Stanley

## 2017-01-17 NOTE — PROGRESS NOTES
Progress Note    Patient: Maria Fernanda Ceballos MRN: [id number]  SSN: [id number]    YOB: 1933  Age: 80 y.o. Sex: female      Admit Date: 1/2/2017    LOS: 15 days     Subjective:     Eating bites and sips per family. Drowsy, oriented to self. Has required Morphine SQ x 7 for pain/dyspnea, Glycopyrrolate x 1 for secretions, Haldol SQ x 2 for nausea, and Haldol x 2 SQ and Ativan po x 1 for agitation. Family at bedside. Review of Systems:  Denies dyspnea. C/o nausea and generalized pain. Objective:     Vitals:    01/15/17 1629 01/16/17 0803 01/16/17 1725 01/17/17 0724   BP: 103/45 109/49 110/51 99/46   Pulse: 70 75 76 68   Resp: 17 18 17 12   Temp: 96.3 °F (35.7 °C) 96.8 °F (36 °C) 96.1 °F (35.6 °C) 96.7 °F (35.9 °C)   Weight:       Height:            Intake and Output:  Current Shift:    Last three shifts: 01/15 1901 - 01/17 0700  In: 120 [P.O.:120]  Out: 400 [Urine:400]    Physical Exam:   GENERAL: Drowsy, oriented to self, mild distress  LUNG: Coarse, diminished breath sounds with unlabored respirations  HEART: regular rate and rhythm, S1, S2 normal, no murmur, click, rub or gallop  ABDOMEN: soft, distended, non-tender. Bowel sounds hypoactive. Small BM 1/16. : Prater catheter with cloudy manuel urine. EXTREMITIES: extremities normal with + pedal pulses. Mild generalized edema. SKIN: Normal. Warm to touch. NEUROLOGIC: Drowsy and oriented to self. Able to answer simple questions. Lab/Data Review:  No new labs resulted in the last 24 hours.     Assessment:     Principal Problem:    Acute respiratory failure with hypoxia (HCC) (1/2/2017)        Plan:     Current Facility-Administered Medications   Medication Dose Route Frequency    LORazepam (ATIVAN) tablet 1 mg  1 mg SubLINGual Q4H PRN    LORazepam (ATIVAN) injection 1 mg  1 mg IntraMUSCular Q4H PRN    fentaNYL (DURAGESIC) 50 mcg/hr patch 1 Patch  1 Patch TransDERmal Q72H    morphine injection 4 mg  4 mg SubCUTAneous Q30MIN PRN    haloperidol lactate (HALDOL) injection 2 mg  2 mg SubCUTAneous Q1H PRN    diphenhydrAMINE (BENADRYL) injection 25 mg  25 mg IntraMUSCular Q6H PRN    acetaminophen (TYLENOL) suppository 650 mg  650 mg Rectal Q3H PRN    bisacodyl (DULCOLAX) suppository 10 mg  10 mg Rectal PRN    haloperidol lactate (HALDOL) injection 2 mg  2 mg SubCUTAneous Q1H PRN    albuterol-ipratropium (DUO-NEB) 2.5 MG-0.5 MG/3 ML  3 mL Nebulization Q4H PRN    glycopyrrolate (ROBINUL) injection 0.2 mg  0.2 mg SubCUTAneous Q4H PRN     Admitted with acute hypoxic respiratory failure for management of pain, dyspnea and delirium.     1. Pain: Fentanyl as ordered. Morphine as ordered. 2.. Dyspnea: Morphine as ordered. Glycopyrrolate prn secretions. Duonebs prn.      3. Delirium: Haldol and Ativan as ordered.     4. Family/Pt Support: Family at bedside during exam. Medications and plan of care discussed with nursing staff and family. Will continue to monitor for symptoms and adjust medications as needed to maintain patient comfort. PPS 20%. Case discussed with Dr. Cheryle Magnus.      Signed By: Serge Bearden NP     January 17, 2017

## 2017-01-17 NOTE — ROUTINE PROCESS
Pt sleeping. Calm. No distress. No facial grimace. Flacc =0-1. Resp shallow, non labored on RA. SR up x2. Bed low/locked. Call light with in reach. Family at the bedside.

## 2017-01-17 NOTE — ROUTINE PROCESS
Pt I'd by name and . Pt calm. No distress. No facial grimace. Flacc =0-1. Resp non labored on RA. Lungs diminished. BS diminished. HR irreg. Edema 3+ in BLE and 2+ in BUE noted at this time. Prater cath draining cloudy manuel urine. SR up x2. Bed low/locked. Call light with in reach. Family at the bedside.

## 2017-01-17 NOTE — ROUTINE PROCESS
Pt with moans. Facial grimace. Flacc =4. SOB noted on RA. Pt repositioned. Morphine 4mg and haldol 2mg sq given in left upper arm. SR up x2. Bed low/locked. Call light with in reach. Family at the bedside.

## 2017-01-17 NOTE — ROUTINE PROCESS
Pt awake. Calm. No distress. No facial grimace. Flacc =0-1. Resp shallow, non labored on RA. Pt repositioned. Prater cath emptied of 400cc cloudy manuel urine. SR up x2. Bed low/locked. Call light with in reach. Family at the bedside.

## 2017-01-17 NOTE — ROUTINE PROCESS
Pt awake. States she is having generalized pain all over  8/10 scale. Facial grimace noted. Resp non labored on RA. Morphine 4mg sq given in left upper arm. Resp non labored on RA. SR up x2. Bed low/locked. Call light with in reach. Family at the bedside.

## 2017-01-17 NOTE — HOSPICE
Bedside and Verbal shift change report given to Satish Porter (oncoming nurse) by Terri Corrales (offgoing nurse). Report included the following information SBAR, Intake/Output and MAR. Pt has required morphine x 5 this shift. PRN meds for nausea as well. More dry heaving and mucus. Glyco given once for trouble coughing up secretions per Pt request. Fentanyl patch changed today and verified with nurse. Small BM this shift as nurse was going in to given bisacodyl. Not needed now. i  All needs met this shift.  Hourly rounding was completed and more time was spent with Pt than what was completely documented running to get emesis bags, change linens, and pain med PRN etc.

## 2017-01-18 NOTE — PROGRESS NOTES
Received report from night shift RN. Patient identified by name and date of birth on armband. No s/sx pain, agitation, dyspnea, or N/V. Bed low and locked, side rails x2, tab alerts on, call light within reach, and door closed with daughter at bedside.

## 2017-01-18 NOTE — PROGRESS NOTES
Pt and daughter state they \"thought about it\" and her last BM was \"not worth writing home about\". She opts for a suppository after we discuss. There is a large amount hard stool in the rectum which is broken up. She tolerates this poorly and a suppository is given . She is attended by family, she is tearful and weepy.

## 2017-01-18 NOTE — PROGRESS NOTES
FLACC 0. Family report her to be restful. RR 16, easy. She does not wake to conversation in the room.

## 2017-01-18 NOTE — PROGRESS NOTES
Restful. Her abd is semi bouncy and we discuss last BM. Pt and her daughter feel she had a good BM 2 days ago. She has good BS in all quads, and no tenderness. At this time she denies pain, nausea. We discuss that lack of good BMs will increase nausea.

## 2017-01-18 NOTE — PROGRESS NOTES
Medicated with lorazepam for continued complaint nausea, anxiety. She is distressed, somewhat tearful. Family very attentive, also tearful. Everyone reassured and supported and they do settled, become less tearful .

## 2017-01-18 NOTE — PROGRESS NOTES
Report taken from off going nurse, safety round completed, patient identified by name and . Patient resting in bed, no s/s of pain, dyspnea, agitation or n/v. Fentanyl 50 mcg intact, family at bed side, tab alarm on, call light within reach, bed lowered and locked, continue monitoring.

## 2017-01-18 NOTE — PROGRESS NOTES
Id, bedside report. FLACC + grimace. She states she has pain in her back and is medicated @ 716 for same. She c/o nausea at this time also. Assessment, ESAS, fall risk and brade completed. She attempts to help to turn but this causes considerable pain and she is feeble.

## 2017-01-19 NOTE — [facility] NOTES
Patient: Hasmukh Wilson    Date: 01/19/17  Time: 4:18 PM    [facility]  Notes     continues to provide spiritual and emotional support for patient and family. During my last visit with the patient she was able to talk, although her voice was much weaker. Patient's daughter came to 's office 1/8/17 and was able to express her feelings. She leans heavily on her clifford as her main source of strength. It is difficult having mom and son in law very sick at the same time. . She has told her daughter to stay at home with her  who is on hospice and take care of him. He has been able to visit patient once since she has been here but most of the time he is confined to home. Vinay Galloway will continue to provide support throughout patient's stay at [facility].        Signed by: Danial Craig

## 2017-01-19 NOTE — PROGRESS NOTES
Received report from night shift RN. Patient sleeping. Verified Fentanyl 50mcg patch with off-going RN. Identified by name and date of birth on armband. No s/sx pain, agitation, dyspnea, or N/V. Bed low and locked, side rails x2, tab alerts on, call light within reach, and door open for continuous monitoring.

## 2017-01-19 NOTE — PROGRESS NOTES
Summary Note- Patient intermittently responsive to verbal/tactile stimuli; responding to pain. Required SQ PRN medications for pain and agitation. Mouth care and minimal sips of water/ice chips. Bowel movement x1 (smear); beard to gravity. Patient safety maintained through hourly rounding: bed low and locked, side rails x2, tab alerts on, call light within reach, and door open for continuous monitoring when family is not present at bedside.

## 2017-01-19 NOTE — PROGRESS NOTES
Progress Note    Patient: Josephine Wild MRN: [id number]  SSN: [id number]    YOB: 1933  Age: 80 y.o. Sex: female      Admit Date: 1/2/2017    LOS: 16 days     Subjective:     Obtunded and NPO. Has required Morphine SQ x 7 for pain/dyspnea, Haldol SQ x 1 for nausea, and Haldol x 4 SQ and Ativan po x 1 for agitation. Family at bedside. Review of Systems:  Unable to be obtained due to patient obtunded. Objective:     Vitals:    01/17/17 0724 01/17/17 1745 01/18/17 0818 01/18/17 2203   BP: 99/46 (!) 68/32 137/51 (!) 75/54   Pulse: 68 83 82 69   Resp: 12 12 19 13   Temp: 96.7 °F (35.9 °C) 97.5 °F (36.4 °C) 95.6 °F (35.3 °C) 97.1 °F (36.2 °C)   Weight:       Height:            Intake and Output:  Current Shift:    Last three shifts: 01/17 0701 - 01/18 1900  In: 0   Out: 401 [Urine:400]    Physical Exam:   GENERAL: Obtunded, mild distress  LUNG: Coarse, diminished breath sounds with unlabored respirations  HEART: regular rate and rhythm, S1, S2 normal, no murmur, click, rub or gallop  ABDOMEN: soft, distended, non-tender. Bowel sounds hypoactive. : Prater catheter with cloudy manuel urine. EXTREMITIES: extremities normal with + pedal pulses. Mild generalized edema. SKIN: Normal. Warm to touch. NEUROLOGIC: Obtunded. Unable to follow commands. Left facial droop. Left eye with slight inward deviation. Lab/Data Review:  No new labs resulted in the last 24 hours.     Assessment:     Principal Problem:    Acute respiratory failure with hypoxia ([address] (1/2/2017)        Plan:     Current Facility-Administered Medications   Medication Dose Route Frequency    LORazepam (ATIVAN) tablet 1 mg  1 mg SubLINGual Q4H PRN    LORazepam (ATIVAN) injection 1 mg  1 mg IntraMUSCular Q4H PRN    fentaNYL (DURAGESIC) 50 mcg/hr patch 1 Patch  1 Patch TransDERmal Q72H    morphine injection 4 mg  4 mg SubCUTAneous Q30MIN PRN    haloperidol lactate (HALDOL) injection 2 mg  2 mg SubCUTAneous Q1H PRN    diphenhydrAMINE (BENADRYL) injection 25 mg  25 mg IntraMUSCular Q6H PRN    acetaminophen (TYLENOL) suppository 650 mg  650 mg Rectal Q3H PRN    bisacodyl (DULCOLAX) suppository 10 mg  10 mg Rectal PRN    haloperidol lactate (HALDOL) injection 2 mg  2 mg SubCUTAneous Q1H PRN    albuterol-ipratropium (DUO-NEB) 2.5 MG-0.5 MG/3 ML  3 mL Nebulization Q4H PRN    glycopyrrolate (ROBINUL) injection 0.2 mg  0.2 mg SubCUTAneous Q4H PRN     Admitted with acute hypoxic respiratory failure for management of pain, dyspnea and delirium.     1. Pain: Fentanyl as ordered. Morphine as ordered. 2.. Dyspnea: Morphine as ordered. Glycopyrrolate prn secretions. Duonebs prn.      3. Delirium: Haldol and Ativan as ordered.     4. Family/Pt Support: Family at bedside during exam. Medications and plan of care discussed with nursing staff and family. Will continue to monitor for symptoms and adjust medications as needed to maintain patient comfort. PPS 10%. Case discussed with Dr. Shaina Nur.  Hold tray per family request.    Signed By: Randee Ortiz NP     January 18, 2017

## 2017-01-19 NOTE — PROGRESS NOTES
Patient's daughter came to 's office this morning. A brief meaningful conversation took place between  and daughter. The daughter says she and her family depend of God for their strength. Their son in law who is suffering with cancer was able to come visit Ms. Heather Ba recently. The family is so pleased he was able to make the trip. Most of the time he has to be homebound. During our brief time together this morning  offered support with a listening ear, compassion and assurance of prayer. Patient continues to sleep a great deal. During alert moments she appears to be able to understand where she is and carry on a  Brief conversation with  and family. Sarah Rader will continue to provide support throughout patient's time at [facility].

## 2017-01-20 NOTE — [facility] IDG MASTER NOTE
Hospice Interdisciplinary Group Collaborative  Date: 01/20/17  Time: 6:34 PM    ___________________    Patient: Kevin Garcia      Follow up: No eating/drinking. Still receiving injectable PRNs for pain. Plan: Symptom management and continuous evaluation to achieve optimum comfort.     ___________________    Diagnoses: There were no encounter diagnoses. Current Medications:    Current Facility-Administered Medications:     fentaNYL (DURAGESIC) 75 mcg/hr patch 1 Patch, 1 Patch, TransDERmal, Q72H, Dorathy Gerardo, NP, 1 Patch at 01/19/17 1226    LORazepam (ATIVAN) tablet 1 mg, 1 mg, SubLINGual, Q4H PRN, Amparo Pryor NP    LORazepam (ATIVAN) injection 1 mg, 1 mg, IntraMUSCular, Q4H PRN, Amparo Pryor NP, 1 mg at 01/20/17 1406    morphine injection 4 mg, 4 mg, SubCUTAneous, Q30MIN PRN, 4 mg at 01/20/17 1229 **OR** [DISCONTINUED] morphine injection 2 mg, 2 mg, IntraVENous, Q20MIN PRN, Amparo Pryor NP    haloperidol lactate (HALDOL) injection 2 mg, 2 mg, SubCUTAneous, Q1H PRN, Amparo Pryor NP, 2 mg at 01/17/17 1828    diphenhydrAMINE (BENADRYL) injection 25 mg, 25 mg, IntraMUSCular, Q6H PRN, Amparo Pryor NP    acetaminophen (TYLENOL) suppository 650 mg, 650 mg, Rectal, Q3H PRN, Lisae Konstantin NP    bisacodyl (DULCOLAX) suppository 10 mg, 10 mg, Rectal, PRN, Dortae LORENZO Pryor, 10 mg at 01/17/17 1630    haloperidol lactate (HALDOL) injection 2 mg, 2 mg, SubCUTAneous, Q1H PRN, Dortae Konstantin NP, 2 mg at 01/20/17 0917    albuterol-ipratropium (DUO-NEB) 2.5 MG-0.5 MG/3 ML, 3 mL, Nebulization, Q4H PRN, Amparo Pryor NP    glycopyrrolate (ROBINUL) injection 0.2 mg, 0.2 mg, SubCUTAneous, Q4H PRN, Amparo Pryor NP, 0.2 mg at 01/16/17 0840    Orders: Allergies:   Allergies   Allergen Reactions    Lortab [Hydrocodone-Acetaminophen] Unknown (comments) and Nausea and Vomiting       ___________________    Care Team Notes          POC/IDG Notes      [facility] IDG Volunteer Notes by Amber Benítez Ruby at 01/20/17 1443  Version 1 of 1    Author:  Vibha Lock Service:  Ignacio Farah Author Type:  Hospice Volunteer/    Filed:  01/20/17 1445 Date of Service:  01/20/17 1443 Status:  Signed    :  Vibha Lock (Hospice Volunteer/)               [facility] Interdisciplinary Plan of Care Review     Status Codes I = Initiated C=Continued R=Revised RS = Resolved     C. Volunteer     Goal: Hospice house volunteer (s) enhances the quality of remaining life while patient is at the hospice house. Interventions: Issa Val[place] Issa Frasert Logan Porras Volunteer (s) will provide companionship to the patient and/or family by visiting at the hospice house       . Issa Porras Volunteer (s) will provide respite as needed when requested by patient and/or family. Issa Goodson  Volunteer will provide activities such as music, reading, pet therapy, etc. as requested. Issa Vald[place] Brad Goodson  Comfort bag delivered. Any other special requests or information regarding volunteer services:    Seven visits are recorded for prayer, companionship, and volunteer nursing assistance. Comfort bag delivered. Extra visits are requested for companionship. No further needs identified at this time. These notes have been discussed in 888 [facility] meeting. Signed by: Vibha Lock       [facility] IDG  Notes by Melisa Guillen at 01/20/17 1412  Version 1 of 1    Author:  Melisa Guillen Service:  Ignacio Farah Author Type:      Filed:  01/20/17 1414 Date of Service:  01/20/17 1412 Status:  Signed    :  Melisa Guillen ()           Patient: Mark Moore    Date: 01/20/17  Time: 2:12 PM    [facility] [place]  Notes  LMSW to continue to provide emotional support for the patient and her daughter. Qasim Cruz son in law has a brain tumor and he is on hospice as well.          Signed by: Melisa Guillen       [facility] [place] IDG  Notes by Santhosh Jain at 01/19/17 1618  Version 1 of 1    Author:  Santhosh Jain Service:  Spiritual Care Author Type:  Pastoral Care    Filed:  01/20/17 1414 Date of Service:  01/19/17 1618 Status:  Signed    :  Santhosh Jain (Pastoral Care)           Patient: Mark Moore    Date: 01/19/17  Time: 4:18 PM    [facility]  Notes     continues to provide spiritual and emotional support for patient and family. During my last visit with the patient she was able to talk, although her voice was much weaker. Patient's daughter came to 's office 1/8/17 and was able to express her feelings. She leans heavily on her clifford as her main source of strength. It is difficult having mom and son in law very sick at the same time. . She has told her daughter to stay at home with her  who is on hospice and take care of him. He has been able to visit patient once since she has been here but most of the time he is confined to home. Subhash Gallegos will continue to provide support throughout patient's stay at [facility]. Signed by: Santhosh Jain       [facility] IDG  Notes by Santhosh Jain at 01/16/17 1407  Version 1 of 1    Author:  Santhosh Jain Service:  Spiritual Care Author Type:  Pastoral Care    Filed:  01/16/17 1412 Date of Service:  01/16/17 1407 Status:  Signed    :  Santhosh Jain (Pastoral Care)           Patient: Mark Moore    Date: 01/16/17  Time: 2:07 PM    [facility]  Notes     continues to be available for patient and family. Patient's daughter continues to be at the bedside faithfully. Her  gives her a break at times. Family feels mom is ready to go   Be with God. Daughter is at peace with mom's impending death. Patient waxes and wanes. During last encounter patient was awake and communicating.        Signed by: Santhosh Jain       [facility] IDG Nurse Notes by Kayla Carlson at 01/16/17 1408  Version 1 of 1    Author:  Kayla Carlson Service:  Ignacio Farah Author Type:  Registered Nurse Filed:  01/16/17 1411 Date of Service:  01/16/17 1408 Status:  Signed    :  Jaylene Jhaveri (Registered Nurse)           Patient: Randall Evans    Date: 01/16/17  Time: 2:08 PM    [address] [place] [address] Nurse Notes    UPDATE: fentanyl patch increased to 37.5 mcg over the weekend related to increased use of PRN medications; despite change, continues to receive a number of PRN injectable morphine doses to manage pain; continues receiving PRN injectable haldol for agitation and nausea - last episode of vomiting this morning; now receiving PRN glyco for secretions; ongoing family support     Goals of care: d/c roxanol; titrate fentanyl patch to 50 mcg to better manage pain with goal to minimize PRN injections; effectiveness of symptom management continuously evaluated to achieve optimum comfort and ultimately a peaceful death      Signed by: Jaylene Jhaveri       [address] [place] [address]  Notes by Jordin Marcano at 01/16/17 1407  Version 1 of 1    Author:  Jordin Marcano Service:  Mary Tucker Author Type:      Filed:  01/16/17 1409 Date of Service:  01/16/17 1407 Status:  Signed    :  Jordin Marcano ()           Patient: Randall Evans    Date: 01/16/17  Time: 2:07 PM    [facility]  Notes  Daughter Allison Jackson at bedside. She vomited this am.  Family is always at bedside. LMSW continues to visit to provide emotional support, active listening and reassurance. Signed by: Jordin Marcano       [address] [place] [address]  Notes by Jordin Marcano at 01/13/17 1229  Version 1 of 1    Author:  Jordin Marcano Service:  Mary Tucker Author Type:      Filed:  01/13/17 1235 Date of Service:  01/13/17 1229 Status:  Signed    :  Jordin Marcano ()           Patient: Randall Evans    Date: 01/13/17  Time: 12:29 PM    [facility]  Notes  LMSW will continue to provide emotional support. Pt was alert and oriented yesterday.   Today she is barely opening her eyes. Family is usually at bedside. No financial concerns for family around final arrangements. Extra support to the daughter. Signed by: Joaquin Ward       \[facility]\"" IDG Nurse Notes by Erica Pablo at 01/13/17 1230  Version 1 of 1    Author:  Erica Pablo Service:  Caitlin Becerril Author Type:  Registered Nurse    Filed:  01/13/17 1234 Date of Service:  01/13/17 1230 Status:  Signed    :  Erica Pablo (Registered Nurse)           Patient: Vanessa Olivares    Date: 01/13/17  Time: 12:30 PM    [facility] Nurse Notes    UPDATE: fentanyl patch increased to 25 mcg yesterday; since increase in patch, patient has required less but still requiring PRN morphine injectables for pain; continues to only take bites and sips; significantly less responsive overall than earlier this week; BP down into the 60's this morning; continues to receive PRN haldol injectables for agitation    Goals of care: will discontinue oral medications at this time; effectiveness of symptom management continuously evaluated to achieve optimum comfort and ultimately a peaceful death        Signed by: Erica Pablo       [facility] IDG  Notes by Isael Dyson at 01/12/17 1641  Version 1 of 1    Author:  Isael Dyson Service:  Spiritual Care Author Type:  Pastoral Care    Filed:  01/13/17 1233 Date of Service:  01/12/17 1641 Status:  Signed    :  Isael Dyson (Pastoral Care)           Patient: Vanessa Olivares    Date: 01/12/17  Time: 4:41 PM    \[facility]\""  Notes     was quite surprised with my last encounter on 11/12/17. Patient was alert and quite able to communicate. She was oriented to herself, family and surroundings. She is grateful to be awake. She says the pain medicine causes her to sleep but she knows she needs that sleep. Visit was interrupted by Rosanne Beatty NP. Rosanne Beatty wanted  to keep talking while she examined her but  felt it was better to return at a later date.    will continue to be available for support to patient and family during patient's time at [facility]   According to Ritesh Phillips, NP patient has changed today and is very sleepy again with little response. Signed by: Mariela [place]       [facility] IDG Volunteer Notes by Samina Jackson at 01/13/17 1157  Version 1 of 1    Author:  Samina Jackson Service:  [facility] Author Type:  Hospice Volunteer/    Filed:  01/13/17 1158 Date of Service:  01/13/17 1157 Status:  Signed    :  Samina Jackson (Hospice Volunteer/)               [facility] [place] [place] Interdisciplinary Plan of Care Review     Status Codes I = Initiated C=Continued R=Revised RS = Resolved     C Volunteer     Goal: Hospice house volunteer (s) enhances the quality of remaining life while patient is at the hospice house. Interventions: Kate Estes Volunteer (s) will provide companionship to the patient and/or family by visiting at the hospice house       . Kate Estes Volunteer (s) will provide respite as needed when requested by patient and/or family. Kate Vasquez  Volunteer will provide activities such as music, reading, pet therapy, etc. as requested. Kate White Layman  Comfort bag delivered. Any other special requests or information regarding volunteer services:   Six visits recorded for companionship, prayer, visits with family. Daughter enjoys company, per team. Volunteers notified. No further needs identified at this time. These notes have been discussed in 888 [facility] meeting.        [facility] IDG  Notes by Mariela [place] at 01/09/17 0717  Version 1 of 1    Author:  Mariela [place] Service:  Spiritual Care Author Type:  Pastoral Care    Filed:  01/09/17 1447 Date of Service:  01/09/17 0944 Status:  Signed    :  Mariela [place] (Pastoral Care)           Patient: Keila Sandoval    Date: 01/09/17  Time: 9:44 AM    [facility]  Notes   provided a safe place for patient's daughter to tell their recent journey. As she began to open up  learned much more than what the patient had gone through. This  family has had many deaths. Patient is one of 10 siblings. She and only one other are left. Patient's  is . Patient has a daughter who is . In addition to the stress of patient's grand son in law has a elizabeth tumor.  offered support with compassion, empathy, listening, reflection and prayer.  also spoke to bereavement coordinator about the various grief issues this family has faced.  to continued to provide support throughout patient's stay at [facility]. Signed by: Rico Murrieta       [address] [place] [address] Nurse Notes by Annmarie Renee at 17 144  Version 1 of 1    Author:  Annmarie Renee Service:  Nba Arias Author Type:  Registered Nurse    Filed:  17 1445 Date of Service:  17 Status:  Signed    :  Annmarie Renee (Registered Nurse)           Patient: John Friedman    Date: 17  Time: 2:42 PM    [facility] Nurse Notes    UPDATE: patient not eating although taking sips at times; continues to see people in the room and asking for them to \"open the gate\"; continues to require PRN morphine injectables for breakthrough pain in addition to fentanyl 12mcg patch; receiving 3-4 doses of PRN haldol injectables daily; ongoing support for patient and family    Goals of care: effectiveness of symptom management continuously evaluated to achieve optimum comfort and ultimately a peaceful death        Signed by: Annmarie Renee       [address] [place] [address]  Notes by Elroy Vora at 17 144  Version 1 of 1    Author:  Elroy Vora Service:  Nba Arias Author Type:      Filed:  17 1444 Date of Service:  17 144 Status:  Signed    :  Elroy Vora ()           Patient: John Friedman    Date: 17  Time: 2:41 PM    [facility]  Notes  LMSW continues to offer emotional support. No community resources have been needed for the family or the pt. Pt continues to decline. Signed by: Issa Salinas       \[facility]\"" IDG Volunteer Notes by Sony Griffith at 01/06/17 1301  Version 1 of 1    Author:  Sony Griffith Service:  Jess Gilmore Author Type:  Hospice Volunteer/    Filed:  01/06/17 1301 Date of Service:  01/06/17 1301 Status:  Signed    :  Sony Griffith (Hospice Volunteer/)               [address] Review     Status Codes I = Initiated C=Continued R=Revised RS = Resolved     I Volunteer     Goal: Hospice house volunteer (s) enhances the quality of remaining life while patient is at the hospice house. Interventions: Velasquez Camposos Velasquez Zuri Lianna Catarina Volunteer (s) will provide companionship to the patient and/or family by visiting at the hospice house       . Velasquez Zuri Lianna Catarina Volunteer (s) will provide respite as needed when requested by patient and/or family. Velasquez Zuri Jose Stewart  Volunteer will provide activities such as music, reading, pet therapy, etc. as requested. Velasquez Zuri Jose Stewart  Comfort bag delivered. Any other special requests or information regarding volunteer services: Three visits recorded for prayer and companionship. No further needs identified at this time. These notes have been discussed in 888 [facility] meeting.        [facility] IDG Nurse Notes by Damien Erwin at 01/06/17 1234  Version 1 of 1    Author:  Damien Erwin Service:  Jess Gilmore Author Type:  Registered Nurse    Filed:  01/06/17 1237 Date of Service:  01/06/17 1234 Status:  Signed    :  Damien Erwin (Registered Nurse)           Patient: Josephine Wild    Date: 01/06/17  Time: 12:34 PM    [facility] Nurse Notes    UPDATE: increased frequency of morphine required more for pain as opposed to dyspnea since the last IDG; fentanyl patch added and PRN morphine continues to be administered to manage pain and dyspnea; delirium presenting more requiring PRN haldol to manage    Goals of care: effectiveness of symptom management continuously evaluated to achieve optimum comfort      Signed by: Jeanne WILKINSONR [facility] IDG  Notes by Jada Martínez at 17 1234  Version 1 of 1    Author:  Jada Martínez Service:  [facility] Author Type:  Pastoral Care    Filed:  17 1235 Date of Service:  17 1234 Status:  Signed    :  Jada Martínez (Pastoral Care)           Patient: Mounika Rey    Date: 17  Time: 12:34 PM    Patient admitted on 17.  Hugo [place] and Pedro Conte have both provided spiritual care for patient/family. Se documentation below for Chaplain Frausto's most recent report:     provided a safe place for patient's daughter to tell their recent journey. As she began to open up  learned much more than what the patient had gone through. I learned that the family has had many deaths. Patient is one of 10 siblings. She and only one other are left. Patient's  is . Patient has a daughter who is . In addition to the stress of patient's grand son in law has a elizabeth tumor. Their most resent turn of events has been the patient's decline. Patients daughter has done all that she knew possible to help her mom improve since she fell in October of last year. The patient went to rehab but while there continued the downward spiral. Daughter was in hopes of getting her mom to a spinal doctor. 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 Her organs began to be compromised and she continued to decline until it was determined she needed comfort rather than curative care. Family is at peace with patient being at Hospice. They are assured of her eternal destination.  During this visit  was able to listen and affirm daughters wonderful care, affirm her clifford and offer prayer for patient and family's journey including the grand son in law who has a brain tumor.      Following the visit  updated our Bereavement Coordinator, ARIANA Castro Div of the family's additional stressors. Signed by: oJse Price       [facility] IDG  Notes by Tyree aJramillo at 01/06/17 1231  Version 1 of 1    Author:  Tyree Jaramillo Service:  Shi Zayas Author Type:      Filed:  01/06/17 1233 Date of Service:  01/06/17 1231 Status:  Signed    :  Tyree Jaramillo ()           Patient: Scott Duff    Date: 01/06/17  Time: 12:31 PM    [facility]  Notes  Spoke to family about giving the \"LTC\" Bed  Up at [facility]. Family is ok with that. Daughter is feeling a little better with the decline of mom. Signed by: [facility]  Notes by Tyree Jaramillo at 01/03/17 1150  Version 1 of 1    Author:  Tyree Jaramillo Service:  Shi Zayas Author Type:      Filed:  01/03/17 1217 Date of Service:  01/03/17 1150 Status:  Signed    :  Tyree Jaramillo ()           Patient: Scott Duff    Date: 01/03/17  Time: 11:50 AM    [facility]  Notes  LMSW will visit to complete the initial assessment. The family was curious if they needed to cancel the LTC bed at [facility]. LMSW advised the family to not give up a bed for long term just yet. The family will be calling the facility to check on the patient's belonging's.   She was getting rehab before going to the hospital.         Signed by: Tyree Jaramillo       [facility] IDG Nurse Notes by Fariha Buckley at 01/03/17 1205  Version 1 of 1    Author:  Fariha Buckley Service:  Shi Zayas Author Type:  Registered Nurse    Filed:  01/03/17 1211 Date of Service:  01/03/17 1205 Status:  Signed    :  Fariha Buckley (Registered Nurse)           Patient: Scott Duff    Date: 01/03/17  Time: 12:05 PM    [facility] Nurse Notes    1st IDG since GIP admission to [facility] yesterday; patient minimally responsive with the exception of yes/no questions and observations of talking with beings unable to be seen by staff about how she is \"ready to go to heaven\"; noted aspiration with oral intake despite thickened and crushed medications; patient also noted to be dyspneic with speech; receiving PRN haldol for agitation/delirium and PRN morphine for dyspnea - both alternating between crushed and injectables; SW assessment pending    Goals of care: effectiveness of symptom management continuously evaluated to achieve optimum comfort        Signed by: Guillermina Torres       [facility] IDG  Notes by Morgan Mario at 01/03/17 1038  Version 1 of 1    Author:  Morgan Mario Service:  Spiritual Care Author Type:  Pastoral Care    Filed:  01/03/17 1200 Date of Service:  01/03/17 1038 Status:  Signed    :  Morgan Mario (Pastoral Care)           Patient: Danielle Bustos    Date: 01/03/17  Time: 10:38 AM    [facility]  Notes    Spiritual Care assessment completed on January 2, 2017 by Nubia Mcgarry. Patient was initially at [facility] and when she became ill was hospitalized with pneumonia and sepsis. She is now GIP level of care. Patient is of [employer]. During 's visit family shared a touching story of how patient led her daughter to Ryan Peter is very important to this family. Family expressed feelings of peace with decisions for patient to be at [facility].  provide support with active listening, compassion and prayer. [employer] to follow up.          Signed by: [facility] Volunte[facility] Notes by Rafat Barber at 01/03/17 1124  Version 1 of 1    Author:  Rafat Barber Service:  Chucky Dewitt Author Type:  Hospice Volunteer/    Filed:  01/03/17 1124 Date of Service:  01/03/17 1124 Status:  Signed    :  Rafat Barber [facility] Volunteer/) [address] Review     Status Codes I = Initiated C=Continued R=Revised RS = Resolved     I Volunteer     Goal: Hospice house volunteer (s) enhances the quality of remaining life while patient is at the hospice house. Interventions: Madina Duenas Volunteer (s) will provide companionship to the patient and/or family by visiting at the hospice house       . Madina Duenas Volunteer (s) will provide respite as needed when requested by patient and/or family. Madina Chapman  Volunteer will provide activities such as music, reading, pet therapy, etc. as requested. Madina Bowmans  Comfort bag delivered. Any other special requests or information regarding volunteer services:     No further needs identified at this time. These notes have been discussed in 888 [facility] meeting.

## 2017-01-20 NOTE — PROGRESS NOTES
Received report from off going RN and assumed care of patient. Patient medicated for pain per MAR. Family at bedside. Fentanyl patch 75 mcg to left chest. Bed low and locked, call light within reach.

## 2017-01-20 NOTE — [facility] IDG VOLUNTEER NOTES
[address] Review     Status Codes I = Initiated C=Continued R=Revised RS = Resolved     C. Volunteer     Goal: Hospice house volunteer (s) enhances the quality of remaining life while patient is at the hospice house. Interventions: Galileo Benitez Volunteer (s) will provide companionship to the patient and/or family by visiting at the hospice house       . Galileo Benitez Volunteer (s) will provide respite as needed when requested by patient and/or family. Galileo Marsh  Volunteer will provide activities such as music, reading, pet therapy, etc. as requested. Galileo Marsh  Comfort bag delivered. Any other special requests or information regarding volunteer services:    Seven visits are recorded for prayer, companionship, and volunteer nursing assistance. Comfort bag delivered. Extra visits are requested for companionship. No further needs identified at this time. These notes have been discussed in [facility] meeting.         Signed by: Maria Luisa Alonso

## 2017-01-20 NOTE — [facility] IDG SOCIAL WORKER NOTES
Patient: John Friedman    Date: 01/20/17  Time: 2:12 PM    [facility]  Notes  LMSW to continue to provide emotional support for the patient and her daughter. Tito Hopes son in law has a brain tumor and he is on hospice as well.          Signed by: Elroy Vora

## 2017-01-21 NOTE — PROGRESS NOTES
Progress Note    Patient: Danielle Bustos MRN: [id number]  SSN: [id number]    YOB: 1933  Age: 80 y.o. Sex: female      Admit Date: 1/2/2017    LOS: 19 days     Clinical Summary:     Ms. Heidi Da Silva is receiving a bit better today. She is comfortable  Though unresponsive which is the way she is most the time regardless of her medication status. Clinical examination is unchanged including the right mastectomy, only mild  Pulmonary congestion and no signs of distress or pain. Vitals:    01/19/17 1546 01/19/17 1556 01/20/17 1613 01/21/17 0630   BP: (!) 84/23 110/53 111/47 110/50   Pulse: 83 90 82 90   Resp: 12  12 15   Temp: 98.8 °F (37.1 °C)  97.6 °F (36.4 °C) 96.7 °F (35.9 °C)   Weight:       Height:                Clinical Assessment:     Principal Problem:    Acute respiratory failure with hypoxia (HCC) (1/2/2017)        Treatment Plan:      I have reviewed the patient's Plan of Care with the nursing staff. I reviewed the situation with the clinical staff. I think she is comfortable and plan no changes in her treatment at present.           Current Facility-Administered Medications   Medication Dose Route Frequency    fentaNYL (DURAGESIC) 75 mcg/hr patch 1 Patch  1 Patch TransDERmal Q72H    LORazepam (ATIVAN) tablet 1 mg  1 mg SubLINGual Q4H PRN    LORazepam (ATIVAN) injection 1 mg  1 mg IntraMUSCular Q4H PRN    morphine injection 4 mg  4 mg SubCUTAneous Q30MIN PRN    haloperidol lactate (HALDOL) injection 2 mg  2 mg SubCUTAneous Q1H PRN    diphenhydrAMINE (BENADRYL) injection 25 mg  25 mg IntraMUSCular Q6H PRN    acetaminophen (TYLENOL) suppository 650 mg  650 mg Rectal Q3H PRN    bisacodyl (DULCOLAX) suppository 10 mg  10 mg Rectal PRN    haloperidol lactate (HALDOL) injection 2 mg  2 mg SubCUTAneous Q1H PRN    albuterol-ipratropium (DUO-NEB) 2.5 MG-0.5 MG/3 ML  3 mL Nebulization Q4H PRN    glycopyrrolate (ROBINUL) injection 0.2 mg  0.2 mg SubCUTAneous Q4H PRN           Signed By: Aarti Harrison MD     January 21, 2017

## 2017-01-21 NOTE — PROGRESS NOTES
Report received from off going RN. Patient round. Patient identified by name and . Patient resting with eyes closed. No s/sx of pain. No distress noted. RR even and unlabored. Bed low and locked. Many family members at the bedside. Call bell within reach.

## 2017-01-21 NOTE — PROGRESS NOTES
Shift summary:The patient has been responsive to pain only this shift. PRN medications have been administered for anxiety and pain and have resolved the problem. Patient has been rounded on and turned at least every 2 hours this shift. No intake at all this shift. Family has been at the bedside and understand the patient is declining.

## 2017-01-22 NOTE — PROGRESS NOTES
Report received from off going RN. Patient round. Patient identified by name and . Patient resting with eyes closed. No s/sx of pain. No distress noted. RR even and unlabored. Bed low and locked. Call bell within reach. Family at the bedside.

## 2017-01-23 NOTE — PROGRESS NOTES
Report received from off going RN. Patient round. Patient identified by name and . Patient resting with eyes closed. No s/sx of pain. No distress noted. RR even and unlabored. Bed low and locked. Family at the bedside. Call bell within reach.

## 2017-01-23 NOTE — [facility] NOTES
Patient: Mark Moore    Date: 01/23/17  Time: 11:13 AM    [facility] [place]  Notes    Patient and family care being provided. Patient and family are at peace with patient's impending death.  to offer emotional and spiritual support. Family is involved in a local [employer] and  is supportive. Patient's grandson in law continues to struggle with cancer.         Signed by: Santhosh Jain

## 2017-01-23 NOTE — [facility] SOCIAL WORKER NOTES
Patient: Sameer Parsons    Date: 01/23/17  Time: 1:30 PM    [facility]  Notes  LMSW will continue to visit with the family to provide emotional support.         Signed by: Eliel Keith

## 2017-01-23 NOTE — PROGRESS NOTES
Fentanyl patch changed and increased. Placed on left chest.   Pt has needed multiple PRNs for agitation, nausea, and mostly pain. Pt restless this shift. Family remained at the bedside today. Pt family with some end of life questions and \"forgot to ask provider when in the room\" so this RN gave family \"Gone From my Sight\" booklet and sat down to spend some time with the family answering questions.

## 2017-01-23 NOTE — [facility] IDG MASTER NOTE
[facility] Collabor[facility]  Date: 01/23/17  Time: 4:00 PM    ___________________    Patient: Kevin Garcia  Update: Still having urine output. 6 doses of PRN morphine in past 24 hours. Fentanyl 75. Plan: increase  to Fentanyl 100. Symptom management and continuous evaluation to achieve optimum comfort.  ___________________    Diagnoses: There were no encounter diagnoses. Current Medications:    Current Facility-Administered Medications:     fentaNYL (DURAGESIC) 100 mcg/hr patch 1 Patch, 1 Patch, TransDERmal, Q72H, Dorathy Hotter, NP, 1 Patch at 01/23/17 1404    LORazepam (ATIVAN) tablet 1 mg, 1 mg, SubLINGual, Q4H PRN, Amparo Pryor NP    LORazepam (ATIVAN) injection 1 mg, 1 mg, IntraMUSCular, Q4H PRN, Amparo Pryor NP, 1 mg at 01/21/17 0033    morphine injection 4 mg, 4 mg, SubCUTAneous, Q30MIN PRN, 4 mg at 01/23/17 1451 **OR** [DISCONTINUED] morphine injection 2 mg, 2 mg, IntraVENous, Q20MIN PRN, Amparo Pryor NP    haloperidol lactate (HALDOL) injection 2 mg, 2 mg, SubCUTAneous, Q1H PRN, Doralee Konstantin NP, 2 mg at 01/23/17 1450    diphenhydrAMINE (BENADRYL) injection 25 mg, 25 mg, IntraMUSCular, Q6H PRN, Amparo Pryor NP    acetaminophen (TYLENOL) suppository 650 mg, 650 mg, Rectal, Q3H PRN, Vickeyalee Konstantin NP    bisacodyl (DULCOLAX) suppository 10 mg, 10 mg, Rectal, PRN, Lisae LORENZO Pryor, 10 mg at 01/17/17 1630    haloperidol lactate (HALDOL) injection 2 mg, 2 mg, SubCUTAneous, Q1H PRN, Doralee Konstantin NP, 2 mg at 01/23/17 1158    albuterol-ipratropium (DUO-NEB) 2.5 MG-0.5 MG/3 ML, 3 mL, Nebulization, Q4H PRN, Amparo Pryor NP    glycopyrrolate (ROBINUL) injection 0.2 mg, 0.2 mg, SubCUTAneous, Q4H PRN, Amparo Pryor NP, 0.2 mg at 01/16/17 0840    Orders: Allergies:   Allergies   Allergen Reactions    Lortab [Hydrocodone-Acetaminophen] Unknown (comments) and Nausea and Vomiting       ___________________    Care Team Notes          POC/IDG Notes      [facility] IDG  Notes by Haydee Frost at 01/23/17 1113  Version 1 of 1    Author:  Haydee Frost Service:  Spiritual Care Author Type:  Pastoral Care    Filed:  01/23/17 1333 Date of Service:  01/23/17 1113 Status:  Signed    :  Haydee Frost (Pastoral Care)           Patient: Lu Rodriguez    Date: 01/23/17  Time: 11:13 AM    [facility]  Notes    Patient and family care being provided. Patient and family are at peace with patient's impending death.  to offer emotional and spiritual support. Family is involved in a local [employer] and  is supportive. Patient's grandson in law continues to struggle with cancer. Signed by: Haydee Frost       [address] [place] [address] ID  Notes by Komal Almaraz at 01/23/17 1330  Version 1 of 1    Author:  Komal Almaraz Service:  Dina Pandey Author Type:      Filed:  01/23/17 1333 Date of Service:  01/23/17 1330 Status:  Signed    :  Komal Almaraz ()           Patient: Lu Rodriguez    Date: 01/23/17  Time: 1:30 PM    [facility]  Notes  LMSW will continue to visit with the family to provide emotional support. Signed by: Komal Almaraz       [facility] IDG Volunteer Notes by Frank Campuzano at 01/20/17 1443  Version 1 of 1    Author:  Frank Campuzano Service:  Dina Pandey Author Type:  Hospice Volunteer/    Filed:  01/20/17 1445 Date of Service:  01/20/17 1443 Status:  Signed    :  Frank Campuzano (Hospice Volunteer/)               [facility] Interdisciplinary Plan of Care Review     Status Codes I = Initiated C=Continued R=Revised RS = Resolved     C. Volunteer     Goal: Hospice house volunteer (s) enhances the quality of remaining life while patient is at the hospice house. Interventions: Jackelin Dillard Volunteer (s) will provide companionship to the patient and/or family by visiting at the hospice house       . Jackelin Dillard Volunteer (s) will provide respite as needed when requested by patient and/or family. Salma Maravilla  Volunteer will provide activities such as music, reading, pet therapy, etc. as requested. Salma Maravilla  Comfort bag delivered. Any other special requests or information regarding volunteer services:    Seven visits are recorded for prayer, companionship, and volunteer nursing assistance. Comfort bag delivered. Extra visits are requested for companionship. No further needs identified at this time. These notes have been discussed in [facility] meeting. Signed by: Juana MONET [facility] IDG  Notes by Kwanji at 01/20/17 1412  Version 1 of 1    Author:  [employer] Allen Service:  Pallavi Mejia Author Type:      Filed:  01/20/17 1414 Date of Service:  01/20/17 1412 Status:  Signed    :  Yajaira Allen ()           Patient: Melissa Edmonds    Date: 01/20/17  Time: 2:12 PM    \[facility]\""  Notes  LMSW to continue to provide emotional support for the patient and her daughter. Justine Blunt son in law has a brain tumor and he is on hospice as well. Signed by: Kwanji       \[facility] [place] [facility]\"" IDG  Notes by Zakia Garcia at 01/19/17 1618  Version 1 of 1    Author:  Zakia Garcia Service:  Spiritual Care Author Type:  Pastoral Care    Filed:  01/20/17 1414 Date of Service:  01/19/17 1618 Status:  Signed    :  Zakia Garcia (Pastoral Care)           Patient: Melissa Edmonds    Date: 01/19/17  Time: 4:18 PM    \[facility] [place] [facility]\""  Notes     continues to provide spiritual and emotional support for patient and family. During my last visit with the patient she was able to talk, although her voice was much weaker. Patient's daughter came to 's office 1/8/17 and was able to express her feelings. She leans heavily on her clifford as her main source of strength. It is difficult having mom and son in law very sick at the same time. . She has told her daughter to stay at home with her  who is on hospice and take care of him. He has been able to visit patient once since she has been here but most of the time he is confined to home. Chelsi Wilkerson will continue to provide support throughout patient's stay at [facility]. Signed by: Vladimir Miller       [facility] IDG  Notes by Vladimir Miller at 01/16/17 1407  Version 1 of 1    Author:  Vladimir Miller Service:  Spiritual Care Author Type:  Pastoral Care    Filed:  01/16/17 1412 Date of Service:  01/16/17 1407 Status:  Signed    :  Vladimir Miller (Pastoral Care)           Patient: Maria Fernanda Liter    Date: 01/16/17  Time: 2:07 PM    [facility]  Notes     continues to be available for patient and family. Patient's daughter continues to be at the bedside faithfully. Her  gives her a break at times. Family feels mom is ready to go   Be with God. Daughter is at peace with mom's impending death. Patient waxes and wanes. During last encounter patient was awake and communicating.        Signed by: Vladimir Miller       [facility] IDG Nurse Notes by Pattie Fields at 01/16/17 1408  Version 1 of 1    Author:  Pattie Fields Service:  [facility] Author Type:  Registered Nurse    Filed:  01/16/17 1411 Date of Service:  01/16/17 1408 Status:  Signed    :  Pattie Fields (Registered Nurse)           Patient: Maria Fernanda Liter    Date: 01/16/17  Time: 2:08 PM    [facility] Nurse Notes    UPDATE: fentanyl patch increased to 37.5 mcg over the weekend related to increased use of PRN medications; despite change, continues to receive a number of PRN injectable morphine doses to manage pain; continues receiving PRN injectable haldol for agitation and nausea - last episode of vomiting this morning; now receiving PRN glyco for secretions; ongoing family support     Goals of care: d/c roxanol; titrate fentanyl patch to 50 mcg to better manage pain with goal to minimize PRN injections; effectiveness of symptom management continuously evaluated to achieve optimum comfort and ultimately a peaceful death      Signed by: Rose Vargas       [address] [place] [address]  Notes by Aarti Hassan at 01/16/17 1407  Version 1 of 1    Author:  Aarti Hassan Service:  Rossy Carl Author Type:      Filed:  01/16/17 1409 Date of Service:  01/16/17 1407 Status:  Signed    :  Aarti Hassan ()           Patient: Coretta Peres    Date: 01/16/17  Time: 2:07 PM    [address] [place] [address]  Notes  Daughter Zeny Dinero at bedside. She vomited this am.  Family is always at bedside. LMSW continues to visit to provide emotional support, active listening and reassurance. Signed by: Aarti Hassan       [address] [place] [address]  Notes by Aarti Hassan at 01/13/17 1229  Version 1 of 1    Author:  Aarti Hassan Service:  Rossy Carl Author Type:      Filed:  01/13/17 1235 Date of Service:  01/13/17 1229 Status:  Signed    :  Aarti Hassan ()           Patient: Coretta Peres    Date: 01/13/17  Time: 12:29 PM    [facility]  Notes  LMSW will continue to provide emotional support. Pt was alert and oriented yesterday. Today she is barely opening her eyes. Family is usually at bedside. No financial concerns for family around final arrangements. Extra support to the daughter.         Signed by: Aarti Hassan       [facility] Rose Vargas at 01/13/17 1230  Version 1 of 1    Author:  Rose Vargas Service:  Rossy Carl Author Type:  Registered Nurse    Filed:  01/13/17 0807 Date of Service:  01/13/17 1230 Status:  Signed    :  Rose Vargas (Registered Nurse)           Patient: Coretta Peres    Date: 01/13/17  Time: 12:30 PM    [facility] Nurse Notes    UPDATE: fentanyl patch increased to 25 mcg yesterday; since increase in patch, patient has required less but still requiring PRN morphine injectables for pain; continues to only take bites and sips; significantly less responsive overall than earlier this week; BP down into the 60's this morning; continues to receive PRN haldol injectables for agitation    Goals of care: will discontinue oral medications at this time; effectiveness of symptom management continuously evaluated to achieve optimum comfort and ultimately a peaceful death        Signed by: Walter Moreland       [facility] IDG  Notes by Damon Fang at 01/12/17 1641  Version 1 of 1    Author:  Damon Fang Service:  Spiritual Care Author Type:  Pastoral Care    Filed:  01/13/17 1233 Date of Service:  01/12/17 1641 Status:  Signed    :  Damon Fang (Pastoral Care)           Patient: Fredi Rosa    Date: 01/12/17  Time: 4:41 PM    [facility]  Notes     was quite surprised with my last encounter on 11/12/17. Patient was alert and quite able to communicate. She was oriented to herself, family and surroundings. She is grateful to be awake. She says the pain medicine causes her to sleep but she knows she needs that sleep. Visit was interrupted by Josue Arreola NP. Josue Arreola wanted  to keep talking while she examined her but  felt it was better to return at a later date.  will continue to be available for support to patient and family during patient's time at [facility]   According to Josue Arreola, LORENZO patient has changed today and is very sleepy again with little response.          Signed by: Damon Fang       [facility] IDG Volunteer Notes by Sherwin Lock at 01/13/17 1157  Version 1 of 1    Author:  Sherwin Lock Service:  Sampson Jesus Author Type:  Hospice Volunteer/    Filed:  01/13/17 1158 Date of Service:  01/13/17 1157 Status:  Signed    :  Sherwin Lock (Hospice Volunteer/)               128 [facility] Interdisciplinary Plan of Care Review     Status Codes I = Initiated C=Continued R=Revised RS = Resolved     C Volunteer     Goal: Hospice house volunteer (s) enhances the quality of remaining life while patient is at the hospice house. Interventions: Qiana Taylor Volunteer (s) will provide companionship to the patient and/or family by visiting at the hospice house       . Qiana Taylor Volunteer (s) will provide respite as needed when requested by patient and/or family. Qiana Condon  Volunteer will provide activities such as music, reading, pet therapy, etc. as requested. Qiana Condon  Comfort bag delivered. Any other special requests or information regarding volunteer services:   Six visits recorded for companionship, prayer, visits with family. Daughter enjoys company, per team. Volunteers notified. No further needs identified at this time. These notes have been discussed in [facility] meeting. [address] [facility]  Notes by Lars Brewer at 17 6820  Version 1 of 1    Author:  Lars Brewer Service:  Spiritual Care Author Type:  Pastoral Care    Filed:  17 1447 Date of Service:  1744 Status:  Signed    :  Lars Brewer (Pastoral Care)           Patient: No Berg    Date: 17  Time: 9:44 AM    [facility] [place]  Notes   provided a safe place for patient's daughter to tell their recent journey. As she began to open up  learned much more than what the patient had gone through. This  family has had many deaths. Patient is one of 10 siblings. She and only one other are left. Patient's  is . Patient has a daughter who is . In addition to the stress of patient's grand son in law has a elizabeth tumor.  offered support with compassion, empathy, listening, reflection and prayer.  also spoke to bereavement coordinator about the various grief issues this family has faced.  to continued to provide support throughout patient's stay at [facility].         Signed by: Lars Brewer       [address] ID Nurse Notes by Sid Norwood at 17 1447  Version 1 of 1 Author:  Rose Vargas Service:  Rossy Carl Author Type:  Registered Nurse    Filed:  01/09/17 1445 Date of Service:  01/09/17 1442 Status:  Signed    :  Rose Vargas (Registered Nurse)           Patient: Coretta Peres    Date: 01/09/17  Time: 2:42 PM    [facility] Nurse Notes    UPDATE: patient not eating although taking sips at times; continues to see people in the room and asking for them to \"open the gate\"; continues to require PRN morphine injectables for breakthrough pain in addition to fentanyl 12mcg patch; receiving 3-4 doses of PRN haldol injectables daily; ongoing support for patient and family    Goals of care: effectiveness of symptom management continuously evaluated to achieve optimum comfort and ultimately a peaceful death        Signed by: Rose Vargas       [facility] IDG  Notes by Aarti Hassan at 01/09/17 1441  Version 1 of 1    Author:  Aarti Hassan Service:  Rossy Carl Author Type:      Filed:  01/09/17 1444 Date of Service:  01/09/17 1441 Status:  Signed    :  Aarti Hassan ()           Patient: Coretta Peres    Date: 01/09/17  Time: 2:41 PM    [facility]  Notes  LMSW continues to offer emotional support. No community resources have been needed for the family or the pt. Pt continues to decline.          Signed by: Aarti Hassan       [facility] IDG Volunteer Notes by Luis Daniel Mcgregor at 01/06/17 1301  Version 1 of 1    Author:  Luis Daniel Mcgregor Service:  Rossy Carl Author Type:  Hospice Volunteer/    Filed:  01/06/17 1301 Date of Service:  01/06/17 1301 Status:  Signed    :  Luis Daniel Mcgregor (Hospice Volunteer/)               [address] Review     Status Codes I = Initiated C=Continued R=Revised RS = Resolved     I Volunteer     Goal: Hospice house volunteer (s) enhances the quality of remaining life while patient is at the hospice house.       Interventions: Aurora Duke Volunteer (s) will provide companionship to the patient and/or family by visiting at the hospice house       . Aurora Duke Volunteer (s) will provide respite as needed when requested by patient and/or family. Aurora Gomez Gurmeetleticia  Volunteer will provide activities such as music, reading, pet therapy, etc. as requested. Aurora Gomez Oar  Comfort bag delivered. Any other special requests or information regarding volunteer services: Three visits recorded for prayer and companionship. No further needs identified at this time. These notes have been discussed in 8 [facility] meeting. [address] [place] [address] Nurse Notes by Leilani Cueto at 01/06/17 1234  Version 1 of 1    Author:  Leilani Cueto Service:  Jayashree Ken Author Type:  Registered Nurse    Filed:  01/06/17 1237 Date of Service:  01/06/17 1234 Status:  Signed    :  Leilani Cueto (Registered Nurse)           Patient: Sofia [place]    Date: 01/06/17  Time: 12:34 PM    [address] [place] [address] Nurse Notes    UPDATE: increased frequency of morphine required more for pain as opposed to dyspnea since the last IDG; fentanyl patch added and PRN morphine continues to be administered to manage pain and dyspnea; delirium presenting more requiring PRN haldol to manage    Goals of care: effectiveness of symptom management continuously evaluated to achieve optimum comfort      Signed by: Leilani Cueto       [address] [place] [address]  Notes by Jennifer Hudson at 01/06/17 1234  Version 1 of 1    Author:  Jennifer Hudson Service:  Jayashree Ken Author Type:  Pastoral Care    Filed:  01/06/17 1235 Date of Service:  01/06/17 1234 Status:  Signed    :  Jennifer Hudson (Pastoral Care)           Patient: Sofia [place]    Date: 01/06/17  Time: 12:34 PM    Patient admitted on 1/2/17. Chaplain Jhoana Marrero and Romina Alvarez have both provided spiritual care for patient/family.   Se documentation below for Chaplain Frausto's most recent report:     provided a safe place for patient's daughter to tell their recent journey. As she began to open up  learned much more than what the patient had gone through. I learned that the family has had many deaths. Patient is one of 10 siblings. She and only one other are left. Patient's  is . Patient has a daughter who is . In addition to the stress of patient's grand son in law has a elizabeth tumor. Their most resent turn of events has been the patient's decline. Patients daughter has done all that she knew possible to help her mom improve since she fell in October of last year. The patient went to rehab but while there continued the downward spiral. Daughter was in hopes of getting her mom to a spinal doctor. 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 Her organs began to be compromised and she continued to decline until it was determined she needed comfort rather than curative care. Family is at peace with patient being at Hospice. They are assured of her eternal destination. During this visit  was able to listen and affirm daughters wonderful care, affirm her clifford and offer prayer for patient and family's journey including the grand son in law who has a brain tumor.      Following the visit  updated our Bereavement Coordinator, ARIANA Tran Div of the family's additional stressors. Signed by: Laura Bolaños       [address] [place] [address]  Notes by Paul Massey at 17 1231  Version 1 of 1    Author:  Paul Massey Service:  Sampson Jesus Author Type:      Filed:  17 1233 Date of Service:  17 1231 Status:  Signed    :  Paul Massey ()           Patient: Fredi Rosa    Date: 17  Time: 12:31 PM    [facility]  Notes  Spoke to family about giving the \"LTC\" Bed  Up at [facility]. Family is ok with that.   Daughter is feeling a little better with the decline of mom. Signed by: Dusty Becerra       [facility] IDG  Notes by Dusty Becerra at 01/03/17 1150  Version 1 of 1    Author:  Dusty Becerra Service:  Brittnee Pichardo Author Type:      Filed:  01/03/17 1217 Date of Service:  01/03/17 1150 Status:  Signed    :  Dusty Becerra ()           Patient: Dragan Cornejo    Date: 01/03/17  Time: 11:50 AM    [facility]  Notes  LMSW will visit to complete the initial assessment. The family was curious if they needed to cancel the LTC bed at [facility]. LMSW advised the family to not give up a bed for long term just yet. The family will be calling the facility to check on the patient's belonging's.   She was getting rehab before going to the hospital.         Signed by: Dusty Becerra       [facility] IDG Nurse Notes by Adolph Bamberger at 01/03/17 1205  Version 1 of 1    Author:  Adolph Bamberger Service:  Brittnee Pichardo Author Type:  Registered Nurse    Filed:  01/03/17 1211 Date of Service:  01/03/17 1205 Status:  Signed    :  Adolph Bamberger (Registered Nurse)           Patient: Dragan Cornejo    Date: 01/03/17  Time: 12:05 PM    [facility] Nurse Notes    1st IDG since GIP admission to [facility] yesterday; patient minimally responsive with the exception of yes/no questions and observations of talking with beings unable to be seen by staff about how she is \"ready to go to heaven\"; noted aspiration with oral intake despite thickened and crushed medications; patient also noted to be dyspneic with speech; receiving PRN haldol for agitation/delirium and PRN morphine for dyspnea - both alternating between crushed and injectables; SW assessment pending    Goals of care: effectiveness of symptom management continuously evaluated to achieve optimum comfort        Signed by: Adolph Bamberger       [facility] IDG  Notes by Gabrielle Negrete at 01/03/17 1038  Version 1 of 1    Author:  Gabrielle Negrete Service:  Spiritual Care Author Type:  Pastoral Care    Filed:  01/03/17 1200 Date of Service:  01/03/17 1038 Status:  Signed    :  Marcia Wong (Pastoral Care)           Patient: Scott Duff    Date: 01/03/17  Time: 10:38 AM    [facility]  Notes    Spiritual Care assessment completed on January 2, 2017 by Meenakshi Lora. Patient was initially at [facility] and when she became ill was hospitalized with pneumonia and sepsis. She is now GIP level of care. Patient is of R.RWolfgang Gallagher. During 's visit family shared a touching story of how patient led her daughter to Marylu Muzzy is very important to this family. Family expressed feelings of peace with decisions for patient to be at [facility].  provide support with active listening, compassion and prayer. [employer] to follow up. Signed by: Marcia Wong       [facility] IDG Volunteer Notes by Rachel Jackson at 01/03/17 1124  Version 1 of 1    Author:  Rachel Jackson Service:  [employer] Author Type:  Hospice Volunteer/    Filed:  01/03/17 1124 Date of Service:  01/03/17 1124 Status:  Signed    :  Rachel Jackson (Hospice Volunteer/)               [address] [facility] Interdisciplinary Plan of Care Review     Status Codes I = Initiated C=Continued R=Revised RS = Resolved     I Volunteer     Goal: Hospice house volunteer (s) enhances the quality of remaining life while patient is at the hospice house. Interventions: Morteza Weeks Volunteer (s) will provide companionship to the patient and/or family by visiting at the hospice house       . Morteza Weeks Volunteer (s) will provide respite as needed when requested by patient and/or family. Morteza Alvarado  Volunteer will provide activities such as music, reading, pet therapy, etc. as requested. Morteza Alvarado  Comfort bag delivered.         Any other special requests or information regarding volunteer services:     No further needs identified at this time. These notes have been discussed in [facility] meeting.

## 2017-01-24 NOTE — PROGRESS NOTES
I saw the patient on morning rounds. .   She is nonresponsive to voice and touch. There is still some edema in her hands and feet and her urine output remains above 500 ML's per day while her blood pressure is also stable. Her son and daughter and one daughter-in-law were at bedside during my visit. We discussed prognosis. Patient is continuing to require parenteral opioid for intermittent pain related to her severe cervical spinal stenosis and peripheral neuropathy. No her labs and unconsciousness seems likely to persist. her demise from the effects of dehydration and renal failure, after having been without fluid for the past 10 days,is likely to occur in the next several days.

## 2017-01-25 NOTE — PROGRESS NOTES
Patient lying in bed resting quietly, alert to person, drowsy. HR regular, clear lungs, active bowel sounds, soft, distended abdomen. Edema in lower extremities:  +2 in left leg and +1 in right leg. No s/sx pain, agitation, nausea/vomiting, anxiety or respiratory distress present.

## 2017-01-25 NOTE — PROGRESS NOTES
Progress Note    Patient: Joseph Wong MRN: [id number]  SSN: [id number]    YOB: 1933  Age: 80 y.o. Sex: female      Admit Date: 1/2/2017    LOS: 23 days     Subjective:   Minimally responsive. Calm and appears comfortable at present. Recently medicated per nursing for pain as evidenced by furrowed brow and increased restlessness. No PO intake. Dtr not at bedside during exam but is present in the building. Review of Systems:  A comprehensive review of systems was negative except for that written in the History of Present Illness. Objective:     Vitals:    01/23/17 0642 01/23/17 1609 01/24/17 0923 01/24/17 1540   BP: 122/72 (!) 78/29 109/45 111/58   Pulse: (!) 57 80 78 82   Resp: 17 15 16    Temp: 98 °F (36.7 °C) 98.8 °F (37.1 °C) 97.4 °F (36.3 °C) 98 °F (36.7 °C)   Weight:       Height:            Intake and Output:  Current Shift: 01/25 0701 - 01/25 1900  In: -   Out: 350 [Urine:350]  Last three shifts: 01/23 1901 - 01/25 0700  In: -   Out: 550 [Urine:550]    Physical Exam:   GENERAL: cooperative, no distress, appears stated age, moderately obese  LUNG: rhonchi coarse throughout all lung fields. HEART: regular rate and rhythm, S1, S2 normal, no murmur, click, rub or gallop  ABDOMEN: soft, non-tender. Bowel sounds diminished. No masses, no organomegaly  EXTREMITIES: edema to all extremities with some weeping. + pedal pulses. SKIN: Normal and no rash or abnormalities  NEUROLOGIC: Obtunded at rest. Opens eyes to name called but closes again quickly. Will nod head yes at times. Bed bound. Generally weakened and debilitated. PSYCHIATRIC: non-focal    Lab/Data Review:  No new labs resulted in the last 24 hours.       Assessment:     Principal Problem:    Acute respiratory failure with hypoxia ([facility] [address] (1/2/2017)        Plan:     Current Facility-Administered Medications   Medication Dose Route Frequency    fentaNYL (DURAGESIC) 25 mcg/hr patch 1 Patch  1 Patch TransDERmal Q72H    And    fentaNYL (DURAGESIC) 100 mcg/hr patch 1 Patch  1 Patch TransDERmal Q72H    LORazepam (ATIVAN) tablet 1 mg  1 mg SubLINGual Q4H PRN    LORazepam (ATIVAN) injection 1 mg  1 mg IntraMUSCular Q4H PRN    morphine injection 4 mg  4 mg SubCUTAneous Q30MIN PRN    haloperidol lactate (HALDOL) injection 2 mg  2 mg SubCUTAneous Q1H PRN    diphenhydrAMINE (BENADRYL) injection 25 mg  25 mg IntraMUSCular Q6H PRN    acetaminophen (TYLENOL) suppository 650 mg  650 mg Rectal Q3H PRN    bisacodyl (DULCOLAX) suppository 10 mg  10 mg Rectal PRN    haloperidol lactate (HALDOL) injection 2 mg  2 mg SubCUTAneous Q1H PRN    albuterol-ipratropium (DUO-NEB) 2.5 MG-0.5 MG/3 ML  3 mL Nebulization Q4H PRN    glycopyrrolate (ROBINUL) injection 0.2 mg  0.2 mg SubCUTAneous Q4H PRN       1. Admitted with acute hypoxic respiratory failure for management of dyspnea, delirium, family/pt support.      2. Dyspnea: Morphine as ordered. Glycopyrrolate PRN secretions. Duonebs PRN. Fentanyl increase to 125 mcg/hr due to increased discomfort.      3. Delirium: Continue Haldol and Ativan as ordered PRN.     4. Family/Pt Support: No family at bedside during exam. Ongoing plan of care including medications discussed with primary RN. Continue to monitor and palliate symptoms as they arise.  PPS 10%.         Signed By: Kaiser Barajas NP     January 25, 2017

## 2017-01-25 NOTE — PROGRESS NOTES
Patient moaning, furrowed eyebrows,  but unable to give pain location. Daughter stated when asked if she was hurting she nodded \"yes\". Morphine prn pain given.

## 2017-01-25 NOTE — PROGRESS NOTES
Patient resting quietly. No pain, nausea, agitation, anxiety, nausea/vomiting or respiratory distress noted.

## 2017-01-25 NOTE — PROGRESS NOTES
Pt given suppository after slight digital disimpaction. Later produced small firm BM. Multiple pain medication doses given this shift and when being repositioned as this appears to agitate Pt and cause her to continuously groan. Family has remained at the bedside. RN spent some time walking and talking with the daughter for a time today. Great conversation about \"the journey to the hospice house\".

## 2017-01-26 NOTE — PROGRESS NOTES
Patient awake, drinking water from spoon that is being given by family member. No pain, anxiety, agitation, nausea/vomiting present.

## 2017-01-26 NOTE — PROGRESS NOTES
Patient is resting quietly with eyes closed, not responding to verbal or tactile stimulation. HR regular, lungs clear, unlabored respirations, active bowel with soft abdomen. Generalized edema +1. RLE edematous + 2. Some mottling on feet. Skin warm, intact. Prater draining yellow urine. No s/sx pain, anxiety, nausea/vomiting, agitation. Bed locked and lowered. Side rails up x 2. Family at bedside.

## 2017-01-26 NOTE — PROGRESS NOTES
Pt pain better controlled this shift. Ativan given once for appearance of anxiety and tearing eyes and calling out for help. BM today. Otherwise, Pt was able to rest better this shift. Report given to oncoming nurse.

## 2017-01-26 NOTE — PROGRESS NOTES
Bedside Report taken from off going rn. Pt identified. Pt in bed with eyes closed. Pt displaying no signs or symptoms of pain, dyspnea, agitation,nausea, or vomiting. Flacc 0/10. Bed locked and low, side rails up, tabs/bed alarm in place, call light with in reach, and door opened for continued monitoring.

## 2017-01-26 NOTE — PROGRESS NOTES
Patient resting quietly. Respirations shallow, but unlabored. No pain, agitation, anxiety, nausea/vomiting present.

## 2017-01-27 NOTE — PROGRESS NOTES
Progress Note    Patient: Scott Duff MRN: [id number]  SSN: [id number]    YOB: 1933  Age: 80 y.o. Sex: female      Admit Date: 1/2/2017    LOS: 25 days     Subjective:     Eyes open, mumbling. Agitated and moaning at times. NPO. Has required Morphine SQ x 6 for pain/dyspnea and Haldol x 2 SQ for agitation. Family at bedside. Review of Systems:  Unable to assess. Objective:     Vitals:    01/24/17 1540 01/25/17 1607 01/26/17 1620 01/27/17 0854   BP: 111/58 141/56 (!) 88/46 111/57   Pulse: 82 77 69 72   Resp:   12 12   Temp: 98 °F (36.7 °C) 97.6 °F (36.4 °C) 96.3 °F (35.7 °C) 96.9 °F (36.1 °C)   Weight:       Height:            Intake and Output:  Current Shift: 01/27 0701 - 01/27 1900  In: -   Out: 300 [Urine:300]  Last three shifts: 01/25 1901 - 01/27 0700  In: 0   Out: 290 [Urine:290]    Physical Exam:   GENERAL: Eyes open and mumbling, mild distress  LUNG: Coarse, diminished breath sounds with unlabored respirations  HEART: regular rate and rhythm, S1, S2 normal, no murmur, click, rub or gallop  ABDOMEN: soft, distended, non-tender. Bowel sounds hypoactive. : Prater catheter with cloudy manuel urine. EXTREMITIES: extremities normal with + pedal pulses. Mild generalized edema. SKIN: Normal. Warm to touch. NEUROLOGIC: Eyes open and responds to stimuli. Unable to follow commands. Lab/Data Review:  No new labs resulted in the last 24 hours.     Assessment:     Principal Problem:    Acute respiratory failure with hypoxia ([facility]) (1/2/2017)        Plan:     Current Facility-Administered Medications   Medication Dose Route Frequency    fentaNYL (DURAGESIC) 25 mcg/hr patch 1 Patch  1 Patch TransDERmal Q72H    And    fentaNYL (DURAGESIC) 100 mcg/hr patch 1 Patch  1 Patch TransDERmal Q72H    LORazepam (ATIVAN) tablet 1 mg  1 mg SubLINGual Q4H PRN    LORazepam (ATIVAN) injection 1 mg  1 mg IntraMUSCular Q4H PRN    morphine injection 4 mg  4 mg SubCUTAneous Q30MIN PRN    haloperidol lactate (HALDOL) injection 2 mg  2 mg SubCUTAneous Q1H PRN    diphenhydrAMINE (BENADRYL) injection 25 mg  25 mg IntraMUSCular Q6H PRN    acetaminophen (TYLENOL) suppository 650 mg  650 mg Rectal Q3H PRN    bisacodyl (DULCOLAX) suppository 10 mg  10 mg Rectal PRN    haloperidol lactate (HALDOL) injection 2 mg  2 mg SubCUTAneous Q1H PRN    albuterol-ipratropium (DUO-NEB) 2.5 MG-0.5 MG/3 ML  3 mL Nebulization Q4H PRN    glycopyrrolate (ROBINUL) injection 0.2 mg  0.2 mg SubCUTAneous Q4H PRN     Admitted with acute hypoxic respiratory failure for management of pain, dyspnea and delirium.     1. Pain: Fentanyl as ordered. Morphine as ordered. 2.. Dyspnea: Morphine as ordered. Glycopyrrolate prn secretions. Duonebs prn.      3. Delirium: Haldol and Ativan as ordered.     4. Family/Pt Support: Family at bedside during exam. Medications and plan of care discussed with nursing staff and family. Will continue to monitor for symptoms and adjust medications as needed to maintain patient comfort. PPS 20%. Case discussed with Dr. Quita Salmon and in [facility] meeting today.     Signed By: Reynaldo Rider NP     January 27, 2017

## 2017-01-27 NOTE — [facility] IDG SOCIAL WORKER NOTES
Patient: John Friedman    Date: 01/27/17  Time: 12:34 PM    \[facility]\""  Notes  LMSW to continue with support to the [place] the daughter. She does not leave her bedside very often. Pt was awake today. Pt is still showing signs of agitation and pain.         Signed by: Elroy Vora

## 2017-01-27 NOTE — PROGRESS NOTES
Summary Note- Patient remains confused and lethargic. Required SQ/IM PRN medications for pain, agitation, anxiety. Mouth care and a few ice chips this shift. Prater to gravity. No bowel movement this shift. Patient safety maintained through hourly rounding: bed low and locked, side rails x2, tab alerts on, call light within reach, and door open for continuous monitoring when family is not at bedside.

## 2017-01-27 NOTE — [facility] IDG MASTER NOTE
[facility] Collabor[facility]  Date: 01/27/17  Time: 4:06 PM    ___________________    Patient: Sofia Clifton  Update: Pt still take small amounts of liquids by drops/straw. Haldol x 2 and Morphine x 6 PRN in past 24 hours. Plan: Symptom management and continuous evaluation to achieve optimum comfort.     ___________________    Diagnoses:  Diagnoses of Neuralgia and neuritis and Spinal stenosis of lumbar region were pertinent to this visit.     Current Medications:    Current Facility-Administered Medications:     fentaNYL (DURAGESIC) 25 mcg/hr patch 1 Patch, 1 Patch, TransDERmal, Q72H, 1 Patch at 01/25/17 1044 **AND** fentaNYL (DURAGESIC) 100 mcg/hr patch 1 Patch, 1 Patch, TransDERmal, Q72H, Edu Kill, NP, 1 Patch at 01/25/17 1040    LORazepam (ATIVAN) tablet 1 mg, 1 mg, SubLINGual, Q4H PRN, Imtiaz Decant, NP, 1 mg at 01/25/17 1349    LORazepam (ATIVAN) injection 1 mg, 1 mg, IntraMUSCular, Q4H PRN, Imtiaz Decant, NP, 1 mg at 01/21/17 0033    morphine injection 4 mg, 4 mg, SubCUTAneous, Q30MIN PRN, 4 mg at 01/27/17 1520 **OR** [DISCONTINUED] morphine injection 2 mg, 2 mg, IntraVENous, Q20MIN PRN, Imtiaz Decant, NP    haloperidol lactate (HALDOL) injection 2 mg, 2 mg, SubCUTAneous, Q1H PRN, Imtiaz Decant, NP, 2 mg at 01/25/17 1103    diphenhydrAMINE (BENADRYL) injection 25 mg, 25 mg, IntraMUSCular, Q6H PRN, Imtiaz Decant, NP    acetaminophen (TYLENOL) suppository 650 mg, 650 mg, Rectal, Q3H PRN, Imtiaz Decant, NP    bisacodyl (DULCOLAX) suppository 10 mg, 10 mg, Rectal, PRN, Imtiaz Decant, NP, 10 mg at 01/24/17 1005    haloperidol lactate (HALDOL) injection 2 mg, 2 mg, SubCUTAneous, Q1H PRN, Imtiaz Decant, NP, 2 mg at 01/27/17 1520    albuterol-ipratropium (DUO-NEB) 2.5 MG-0.5 MG/3 ML, 3 mL, Nebulization, Q4H PRN, Imtiaz Rob NP    glycopyrrolate (ROBINUL) injection 0.2 mg, 0.2 mg, SubCUTAneous, Q4H PRN, Imtiaz Rob NP, 0.2 mg at 01/16/17 0840    Orders: Allergies: Allergies   Allergen Reactions    Lortab [Hydrocodone-Acetaminophen] Unknown (comments) and Nausea and Vomiting       ___________________    Care Team Notes          POC/IDG Notes      [facility] IDG  Notes by Azar Middleton at 01/27/17 1234  Version 1 of 1    Author:  Azar Middleton Service:  Dedra Sloan Author Type:      Filed:  01/27/17 1238 Date of Service:  01/27/17 1234 Status:  Signed    :  Azar Middleton ()           Patient: Zandra Land    Date: 01/27/17  Time: 12:34 PM    [facility]  Notes  LMSW to continue with support to the Render Dad the daughter. She does not leave her bedside very often. Pt was awake today. Pt is still showing signs of agitation and pain. Signed by: Azar Middleton       [facility] IDG  Notes by Lila Tovar at 01/27/17 1230  Version 1 of 1    Author:  Lila Tovar Service:  Spiritual Care Author Type:  Pastoral Care    Filed:  01/27/17 1238 Date of Service:  01/27/17 1230 Status:  Signed    :  Lila Tovar (Pastoral Care)           Patient: Zandra Land    Date: 01/27/17  Time: 12:30 PM    [facility]  Notes    's last encounter with the was in the cafeteria. Daughter and son in law continue to depend on God to give them strength for the journey. They are at peace with mom's impending death. No new spiritual issues.          Signed by: Lila Tovar       [facility] IDG Volunteer Notes by Gillian Longo at 01/27/17 1151  Version 1 of 1    Author:  Gillian Longo Service:  Dedra Sloan Author Type:  Hospice Volunteer/    Filed:  01/27/17 1152 Date of Service:  01/27/17 1151 Status:  Signed    :  Gillian Longo (Hospice Volunteer/)               128 [facility] Interdisciplinary Plan of Care Review     Status Codes I = Initiated C=Continued R=Revised RS = Resolved     C Volunteer     Goal: Hospice house volunteer (s) enhances the quality of remaining life while patient is at the hospice house. Interventions: Zack Nicholson Volunteer (s) will provide companionship to the patient and/or family by visiting at the hospice house       . Zack Nicholson Volunteer (s) will provide respite as needed when requested by patient and/or family. Zack Buckner  Volunteer will provide activities such as music, reading, pet therapy, etc. as requested. Zack Buckner  Comfort bag delivered. Any other special requests or information regarding volunteer services:    13 visits recorded for prayer, pet therapy, companionship. No further needs identified at this time. These notes have been discussed in 8 [facility] meeting. [address] [place] [address]  Notes by Rosina Bruner at 01/23/17 1113  Version 1 of 1    Author:  Rosina Bruner Service:  Spiritual Care Author Type:  Pastoral Care    Filed:  01/23/17 1333 Date of Service:  01/23/17 1113 Status:  Signed    :  Rosina Bruner (Pastoral Care)           Patient: Lars Tucker    Date: 01/23/17  Time: 11:13 AM    [facility]  Notes    Patient and family care being provided. Patient and family are at peace with patient's impending death.  to offer emotional and spiritual support. Family is involved in a local [employer] and  is supportive. Patient's grandson in law continues to struggle with cancer. Signed by: Rosina Bruner       [address] [place] [address]  Notes by Missy Cortes at 01/23/17 1330  Version 1 of 1    Author:  Missy Cortes Service:  Rene Bettencourt Author Type:      Filed:  01/23/17 1333 Date of Service:  01/23/17 1330 Status:  Signed    :  Missy Cortes ()           Patient: Lars Tucker    Date: 01/23/17  Time: 1:30 PM    [facility] [place] [facility]  Notes  LMSW will continue to visit with the family to provide emotional support.         Signed by: Missy Cortes       [facility] Volunteer Notes by Sharmin Bond at 01/20/17 1443  Version 1 of 1    Author:  Chan Ortiz Service:  Tashia Jacobson Author Type:  Hospice Volunteer/    Filed:  01/20/17 1445 Date of Service:  01/20/17 1443 Status:  Signed    :  Chan Ortiz (Hospice Volunteer/)               128 [facility] Interdisciplinary Plan of Care Review     Status Codes I = Initiated C=Continued R=Revised RS = Resolved     C. Volunteer     Goal: Hospice house volunteer (s) enhances the quality of remaining life while patient is at the hospice house. Interventions: Chico Chan Ahumada Volunteer (s) will provide companionship to the patient and/or family by visiting at the hospice house       . [place] Pltereza Areric Bauerumada Volunteer (s) will provide respite as needed when requested by patient and/or family. [place] Motatereza Karlri Chauncey  Volunteer will provide activities such as music, reading, pet therapy, etc. as requested. Chico Hancockas  Comfort bag delivered. Any other special requests or information regarding volunteer services:    Seven visits are recorded for prayer, companionship, and volunteer nursing assistance. Comfort bag delivered. Extra visits are requested for companionship. No further needs identified at this time. These notes have been discussed in 888 [facility] meeting. Signed by: Chan MONET [facility] IDG  Notes by Simona Hurtado at 01/20/17 1412  Version 1 of 1    Author:  Simona Hurtado Service:  Tashia Jacobson Author Type:      Filed:  01/20/17 1414 Date of Service:  01/20/17 1412 Status:  Signed    :  Simona Hurtado ()           Patient: Ira Saldivar    Date: 01/20/17  Time: 2:12 PM    [facility]  Notes  LMSW to continue to provide emotional support for the patient and her daughter. Connie Virgen son in law has a brain tumor and he is on hospice as well.          Signed by: Simona Hurtado       [facility] IDG  Notes by Yoli Camacho at 01/19/17 4768  Version 1 of 1    Author:  Mikayla Colon Service:  Spiritual Care Author Type:  Pastoral Care    Filed:  01/20/17 1414 Date of Service:  01/19/17 1618 Status:  Signed    :  Mikayla Colon (Pastoral Care)           Patient: Sameer Parsons    Date: 01/19/17  Time: 4:18 PM    [facility] [place]  Notes     continues to provide spiritual and emotional support for patient and family. During my last visit with the patient she was able to talk, although her voice was much weaker. Patient's daughter came to 's office 1/8/17 and was able to express her feelings. She leans heavily on her clifford as her main source of strength. It is difficult having mom and son in law very sick at the same time. . She has told her daughter to stay at home with her  who is on hospice and take care of him. He has been able to visit patient once since she has been here but most of the time he is confined to home. [address] [place] [address] will continue to provide support throughout patient's stay at [facility]. Signed by: Mikayla Colon       [facility] IDG  Notes by Mikayla Colon at 01/16/17 1407  Version 1 of 1    Author:  Mikayla Colon Service:  Spiritual Care Author Type:  Pastoral Care    Filed:  01/16/17 1412 Date of Service:  01/16/17 1407 Status:  Signed    :  Mikayla Colon (Pastoral Care)           Patient: Sameer Parsons    Date: 01/16/17  Time: 2:07 PM    [facility] [place]  Notes     continues to be available for patient and family. Patient's daughter continues to be at the bedside faithfully. Her  gives her a break at times. Family feels mom is ready to go   Be with God. Daughter is at peace with mom's impending death. Patient waxes and wanes. During last encounter patient was awake and communicating.        Signed by: Mikayla Colon       [facility] IDG Nurse Notes by Cherise Victoria at 01/16/17 1408  Version 1 of 1    Author:  Cherise Victoria Service:  Rajni Jesus Author Type:  Registered Nurse    Filed: 01/16/17 1411 Date of Service:  01/16/17 1408 Status:  Signed    :  Marsha Marte (Registered Nurse)           Patient: Lu Rodriguez    Date: 01/16/17  Time: 2:08 PM    [address] [place] [address] Nurse Notes    UPDATE: fentanyl patch increased to 37.5 mcg over the weekend related to increased use of PRN medications; despite change, continues to receive a number of PRN injectable morphine doses to manage pain; continues receiving PRN injectable haldol for agitation and nausea - last episode of vomiting this morning; now receiving PRN glyco for secretions; ongoing family support     Goals of care: d/c roxanol; titrate fentanyl patch to 50 mcg to better manage pain with goal to minimize PRN injections; effectiveness of symptom management continuously evaluated to achieve optimum comfort and ultimately a peaceful death      Signed by: Marsha Marte       [address] [place] [address]  Notes by Komal Almaraz at 01/16/17 1407  Version 1 of 1    Author:  Komal Almaraz Service:  Dina Pandey Author Type:      Filed:  01/16/17 1409 Date of Service:  01/16/17 1407 Status:  Signed    :  Komal Almaraz ()           Patient: uL Rodriguez    Date: 01/16/17  Time: 2:07 PM    [facility]  Notes  Daughter Viri Adams at bedside. She vomited this am.  Family is always at bedside. LMSW continues to visit to provide emotional support, active listening and reassurance. Signed by: Komal Almaraz       [address] [place] [address]  Notes by Komal Almaraz at 01/13/17 1229  Version 1 of 1    Author:  Komal Almaraz Service:  Dina Pandey Author Type:      Filed:  01/13/17 1235 Date of Service:  01/13/17 1229 Status:  Signed    :  Komal Almaraz ()           Patient: Lu Rodriguez    Date: 01/13/17  Time: 12:29 PM    [facility]  Notes  LMSW will continue to provide emotional support. Pt was alert and oriented yesterday.   Today she is barely opening her eyes. Family is usually at bedside. No financial concerns for family around final arrangements. Extra support to the daughter. Signed by: Ekaterina Boucher       [facility] IDG Nurse Notes by Marcos Graham at 01/13/17 1230  Version 1 of 1    Author:  Marcos Graham Service:  Baldo Moran Author Type:  Registered Nurse    Filed:  01/13/17 1234 Date of Service:  01/13/17 1230 Status:  Signed    :  Marcos Graham (Registered Nurse)           Patient: Savannah Gil    Date: 01/13/17  Time: 12:30 PM    [address] [place] [address] Nurse Notes    UPDATE: fentanyl patch increased to 25 mcg yesterday; since increase in patch, patient has required less but still requiring PRN morphine injectables for pain; continues to only take bites and sips; significantly less responsive overall than earlier this week; BP down into the 60's this morning; continues to receive PRN haldol injectables for agitation    Goals of care: will discontinue oral medications at this time; effectiveness of symptom management continuously evaluated to achieve optimum comfort and ultimately a peaceful death        Signed by: Marcos Graham       [address] [place] [address]  Notes by José Armenta at 01/12/17 1641  Version 1 of 1    Author:  José Armenta Service:  Spiritual Care Author Type:  Pastoral Care    Filed:  01/13/17 1233 Date of Service:  01/12/17 1641 Status:  Signed    :  José Armenta (Pastoral Care)           Patient: Savannah Gil    Date: 01/12/17  Time: 4:41 PM    [facility]  Notes     was quite surprised with my last encounter on 11/12/17. Patient was alert and quite able to communicate. She was oriented to herself, family and surroundings. She is grateful to be awake. She says the pain medicine causes her to sleep but she knows she needs that sleep. Visit was interrupted by Maxim Del Valle NP. Maxim Del Valle wanted  to keep talking while she examined her but  felt it was better to return at a later date.    will continue to be available for support to patient and family during patient's time at [facility]   According to Dragan Freitas, NP patient has changed today and is very sleepy again with little response. Signed by: Yoli Camacho       [facility] IDG Volunteer Notes by Danny Parker at 01/13/17 1157  Version 1 of 1    Author:  Danny Parker Service:  Tashia Jacobson Author Type:  Hospice Volunteer/    Filed:  01/13/17 1158 Date of Service:  01/13/17 1157 Status:  Signed    :  Danny Parker (Hospice Volunteer/)               128 [facility] Interdisciplinary Plan of Care Review     Status Codes I = Initiated C=Continued R=Revised RS = Resolved     C Volunteer     Goal: Hospice house volunteer (s) enhances the quality of remaining life while patient is at the hospice house. Interventions: Starlyn Plough Starlyn Plough Arland Ahumada Volunteer (s) will provide companionship to the patient and/or family by visiting at the hospice house       . Starlyn Plough Arland Ahumada Volunteer (s) will provide respite as needed when requested by patient and/or family. Chico Grossman  Volunteer will provide activities such as music, reading, pet therapy, etc. as requested. Chico Grossman  Comfort bag delivered. Any other special requests or information regarding volunteer services:   Six visits recorded for companionship, prayer, visits with family. Daughter enjoys company, per team. Volunteers notified. No further needs identified at this time. These notes have been discussed in 888 [facility] meeting. [facility] IDG  Notes by Yoli Camacho at 01/09/17 6661  Version 1 of 1    Author:  Yoli Camacho Service:  Spiritual Care Author Type:  Pastoral Care    Filed:  01/09/17 1447 Date of Service:  01/09/17 0944 Status:  Signed    :  Yoli Camacho (Pastoral Care)           Patient: Ira Saldivar    Date: 01/09/17  Time: 9:44 AM    [facility]  Notes   provided a safe place for patient's daughter to tell their recent journey.  As she began to open up  learned much more than what the patient had gone through. This  family has had many deaths. Patient is one of 10 siblings. She and only one other are left. Patient's  is . Patient has a daughter who is . In addition to the stress of patient's grand son in law has a elizabeth tumor.  offered support with compassion, empathy, listening, reflection and prayer.  also spoke to bereavement coordinator about the various grief issues this family has faced.  to continued to provide support throughout patient's stay at [facility]. Signed by: Billy Zhang       [facility] IDG Nurse Notes by Pastora Stevens at 17 144  Version 1 of 1    Author:  Pastora Stevens Service:  Mago Lr Author Type:  Registered Nurse    Filed:  17 1445 Date of Service:  17 Status:  Signed    :  Pastora Stevens (Registered Nurse)           Patient: Heriberto Curtis    Date: 17  Time: 2:42 PM    [facility] Nurse Notes    UPDATE: patient not eating although taking sips at times; continues to see people in the room and asking for them to \"open the gate\"; continues to require PRN morphine injectables for breakthrough pain in addition to fentanyl 12mcg patch; receiving 3-4 doses of PRN haldol injectables daily; ongoing support for patient and family    Goals of care: effectiveness of symptom management continuously evaluated to achieve optimum comfort and ultimately a peaceful death        Signed by: Pastora Stevnes       [facility] IDG  Notes by Arthurine Cranker at 17 144  Version 1 of 1    Author:  Arthurine Cranker Service:  Mago Lr Author Type:      Filed:  17 1444 Date of Service:  17 Status:  Signed    :  Arthurine Cranker ()           Patient: Heriberto Curtis    Date: 17  Time: 2:41 PM    [facility]  Notes  LMSW continues to offer emotional support.   No community resources have been needed for the family or the pt. Pt continues to decline. Signed by: Elroy Vora       [facility] IDG Volunteer Notes by David Amezquita at 01/06/17 1301  Version 1 of 1    Author:  David Amezquita Service:  Nba Arias Author Type:  Hospice Volunteer/    Filed:  01/06/17 1301 Date of Service:  01/06/17 1301 Status:  Signed    :  David Amezquita (Hospice Volunteer/)               [place] [address] of Care Review     Status Codes I = Initiated C=Continued R=Revised RS = Resolved     I Volunteer     Goal: Hospice house volunteer (s) enhances the quality of remaining life while patient is at the hospice house. Interventions: Candance Hugeleticia Candance Huger Clayton Macleod Volunteer (s) will provide companionship to the patient and/or family by visiting at the hospice house       . Candance Huger Kris Macleod Volunteer (s) will provide respite as needed when requested by patient and/or family. Candance Huger Dwight Lietodd  Volunteer will provide activities such as music, reading, pet therapy, etc. as requested. Candance Huger Jonh Lien  Comfort bag delivered. Any other special requests or information regarding volunteer services: Three visits recorded for prayer and companionship. No further needs identified at this time. These notes have been discussed in 888 [facility] meeting.        [address] [place] [address] IDG Nurse Notes by Annmarie Renee at 01/06/17 1234  Version 1 of 1    Author:  Annmarie Renee Service:  Nba Arias Author Type:  Registered Nurse    Filed:  01/06/17 1237 Date of Service:  01/06/17 1234 Status:  Signed    :  Annmarie Renee (Registered Nurse)           Patient: John Friedman    Date: 01/06/17  Time: 12:34 PM    [address] [place] [address] Nurse Notes    UPDATE: increased frequency of morphine required more for pain as opposed to dyspnea since the last IDG; fentanyl patch added and PRN morphine continues to be administered to manage pain and dyspnea; delirium presenting more requiring PRN haldol to manage    Goals of care: effectiveness of symptom management continuously evaluated to achieve optimum comfort      Signed by: Marsha MONET [facility] IDG  Notes by Francisco Keller at 17 1234  Version 1 of 1    Author:  Francisco Keller Service:  Dina Pandey Author Type:  Pastoral Care    Filed:  17 1235 Date of Service:  17 1234 Status:  Signed    :  Francisco Keller (Pastoral Care)           Patient: Lu Rodriguez    Date: 17  Time: 12:34 PM    Patient admitted on 17.  Gio Romero and Lacy Carmichael have both provided spiritual care for patient/family. Se documentation below for Chaplain Frausto's most recent report:     provided a safe place for patient's daughter to tell their recent journey. As she began to open up  learned much more than what the patient had gone through. I learned that the family has had many deaths. Patient is one of 10 siblings. She and only one other are left. Patient's  is . Patient has a daughter who is . In addition to the stress of patient's grand son in law has a elizabeth tumor. Their most resent turn of events has been the patient's decline. Patients daughter has done all that she knew possible to help her mom improve since she fell in October of last year. The patient went to rehab but while there continued the downward spiral. Daughter was in hopes of getting her mom to a spinal doctor. 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 Her organs began to be compromised and she continued to decline until it was determined she needed comfort rather than curative care. Family is at peace with patient being at Hospice. They are assured of her eternal destination.  During this visit  was able to listen and affirm daughters wonderful care, affirm her clifford and offer prayer for patient and family's journey including the grand son in law who has a brain tumor.      Following the visit  updated our Bereavement Coordinator, ARIANA Noland Div of the family's additional stressors. Signed by: Carl Briones       [facility] IDG  Notes by Shama Mcgrath at 01/06/17 1231  Version 1 of 1    Author:  Shama Mcgrath Service:  Kvng Ferreira Author Type:      Filed:  01/06/17 1233 Date of Service:  01/06/17 1231 Status:  Signed    :  Shama Mcgrath ()           Patient: No Berg    Date: 01/06/17  Time: 12:31 PM    [facility]  Notes  Spoke to family about giving the \"LTC\" Bed  Up at [facility]. Family is ok with that. Daughter is feeling a little better with the decline of mom. Signed by: Shama Mcgrath       [facility] IDG  Notes by Shama Mcgrath at 01/03/17 1150  Version 1 of 1    Author:  Shama Mcgrath Service:  Kvng Ferreira Author Type:      Filed:  01/03/17 1217 Date of Service:  01/03/17 1150 Status:  Signed    :  Shama Mcgrath ()           Patient: No Berg    Date: 01/03/17  Time: 11:50 AM    [facility]  Notes  LMSW will visit to complete the initial assessment. The family was curious if they needed to cancel the LTC bed at [facility]. LMSW advised the family to not give up a bed for long term just yet. The family will be calling the facility to check on the patient's belonging's.   She was getting rehab before going to the hospital.         Signed by: Shama Mcgrath       [facility] IDG Nurse Notes by Sid Norwood at 01/03/17 1205  Version 1 of 1    Author:  Sid Norwood Service:  Kvng Ferreira Author Type:  Registered Nurse    Filed:  01/03/17 1211 Date of Service:  01/03/17 1205 Status:  Signed    :  Sid Norwood (Registered Nurse)           Patient: No Berg    Date: 01/03/17  Time: 12:05 PM    [facility]    1st IDG since [facility] admission to [facility] yesterday; patient minimally responsive with the exception of yes/no questions and observations of talking with beings unable to be seen by staff about how she is \"ready to go to heaven\"; noted aspiration with oral intake despite thickened and crushed medications; patient also noted to be dyspneic with speech; receiving PRN haldol for agitation/delirium and PRN morphine for dyspnea - both alternating between crushed and injectables; SW assessment pending    Goals of care: effectiveness of symptom management continuously evaluated to achieve optimum comfort        Signed by: Jeanne Cano       [place] [facility]  Notes by Karyle Richard at 01/03/17 1038  Version 1 of 1    Author:  Karyle Richard Service:  Spiritual Care Author Type:  Pastoral Care    Filed:  01/03/17 1200 Date of Service:  01/03/17 1038 Status:  Signed    :  Karyle Richard (Pastoral Care)           Patient: Mounika Rey    Date: 01/03/17  Time: 10:38 AM    [facility]  Notes    Spiritual Care assessment completed on January 2, 2017 by Albert Richards. Patient was initially at [facility] and when she became ill was hospitalized with pneumonia and sepsis. She is now GIP level of care. Patient is of [employer]. During 's visit family shared a touching story of how patient led her daughter to [facility] [place] is very important to this family. Family expressed feelings of peace with decisions for patient to be at [facility].  provide support with active listening, compassion and prayer. [employer] to follow up.          Signed by: Karyle Richard [facility] Volunteer Notes by Tata Richmond at 01/03/17 1124  Version 1 of 1    Author:  Tata Richmond Service:  Matt Markham Author Type:  Hospice Volunteer/    Filed:  01/03/17 1124 Date of Service:  01/03/17 1124 Status:  Signed    :  Tata Richmond [facility] Volunteer/) [address] Review     Status Codes I = Initiated C=Continued R=Revised RS = Resolved     I Volunteer     Goal: Hospice house volunteer (s) enhances the quality of remaining life while patient is at the hospice house. Interventions: Bill Iglesias  Volunteer (s) will provide companionship to the patient and/or family by visiting at the hospice house       . Bill Iglesias  Volunteer (s) will provide respite as needed when requested by patient and/or family. Bill Lloyd  Volunteer will provide activities such as music, reading, pet therapy, etc. as requested. Bill Lloyd  Comfort bag delivered. Any other special requests or information regarding volunteer services:     No further needs identified at this time. These notes have been discussed in [facility] meeting.

## 2017-01-27 NOTE — PROGRESS NOTES
Report taken from off going nurse, safety round completed, patient identified by name and . Patient resting in bed, no s/s of pain, dyspnea, agitation or n/v. 125 mcg Fentanyl transdermal patches are intact,  family at bed side, tab alarm on, call light within reach, bed lowered and locked, continue monitoring.

## 2017-01-27 NOTE — [facility] CHAPLAIN NOTES
Patient: Lori Ennis    Date: 01/27/17  Time: 12:30 PM    [facility]  Notes    's last encounter with the was in the cafeteria. Daughter and son in law continue to depend on God to give them strength for the journey. They are at peace with mom's impending death. No new spiritual issues.          Signed by: Gavino Napoles

## 2017-01-27 NOTE — [facility] VOLUNTEER NOTES
[address] Review     Status Codes I = Initiated C=Continued R=Revised RS = Resolved     C Volunteer     Goal: Hospice house volunteer (s) enhances the quality of remaining life while patient is at the hospice house. Interventions: Danielle Carmona Volunteer (s) will provide companionship to the patient and/or family by visiting at the hospice house       . Danielle Carmona Volunteer (s) will provide respite as needed when requested by patient and/or family. Danielle Bee  Volunteer will provide activities such as music, reading, pet therapy, etc. as requested. Danielle Bee  Comfort bag delivered. Any other special requests or information regarding volunteer services:    13 visits recorded for prayer, pet therapy, companionship. No further needs identified at this time. These notes have been discussed in [facility] meeting.

## 2017-01-28 NOTE — PROGRESS NOTES
Summary Note- Patient is lethargic, yet responsive to pain. Required SQ/IM PRN medications for pain/anxiety. Mouth care only. Prater to gravity; no bowel movement this shift. Bilateral feet slightly mottled. Patient safety maintained through hourly rounding: bed low and locked, side rails x2, tab alerts on, call light within reach, and door open for continuous monitoring.

## 2017-01-28 NOTE — PROGRESS NOTES
Received report from night shift RN. Verified Fentanyl patches (equal to 125mcg). Patient sleeping. Identified by name and date of birth on armband. No s/sx pain, agitation, dyspnea, or N/V. Bed low and locked, side rails x2, tab alerts on, call light within reach, and door closed with daughter at bedside.

## 2017-01-28 NOTE — PROGRESS NOTES
Report taken from off going nurse, safety round completed, patient identified by name and . Patient resting in bed, no s/s of  pain, dyspnea, agitation or n/v. Fentanyl 125 mcg transdermal patches intact, family at bed side, tab alarm on, call light within reach, bed lowered and locked, continue monitoring.

## 2017-01-29 NOTE — PROGRESS NOTES
Progress Note    Patient: Baker Essex MRN: [id number]  SSN: [id number]    YOB: 1933  Age: 80 y.o. Sex: female      Admit Date: 1/2/2017    LOS: 26 days     Subjective:     Eyes open, mumbling. NPO. Has required Morphine SQ x 6 for pain/dyspnea and Haldol x 4 SQ and Ativan x2 IM for agitation. Family at bedside. Review of Systems:  Unable to assess. Objective:     Vitals:    01/27/17 1454 01/28/17 1528 01/28/17 1724 01/28/17 1725   BP: 104/50 (!) 92/27 (!) 85/39 104/49   Pulse: 70 71 72 71   Resp: 12 15     Temp: 97.3 °F (36.3 °C) 96.1 °F (35.6 °C)     Weight:       Height:            Intake and Output:  Current Shift:    Last three shifts: 01/27 0701 - 01/28 1900  In: 0   Out: 400 [Urine:400]    Physical Exam:   GENERAL: Eyes open and mumbling, mild distress  LUNG: Coarse, diminished breath sounds with unlabored respirations  HEART: regular rate and rhythm, S1, S2 normal, no murmur, click, rub or gallop  ABDOMEN: soft, distended, non-tender. Bowel sounds hypoactive. : Prater catheter with cloudy manuel urine. EXTREMITIES: extremities normal with + pedal pulses. Mild generalized edema. SKIN: Normal. Warm to touch. NEUROLOGIC: Eyes open and responds to stimuli. Unable to follow commands. Agitated. Lab/Data Review:  No new labs resulted in the last 24 hours.     Assessment:     Principal Problem:    Acute respiratory failure with hypoxia (HCC) (1/2/2017)        Plan:     Current Facility-Administered Medications   Medication Dose Route Frequency    fentaNYL (DURAGESIC) 25 mcg/hr patch 1 Patch  1 Patch TransDERmal Q72H    And    fentaNYL (DURAGESIC) 100 mcg/hr patch 1 Patch  1 Patch TransDERmal Q72H    LORazepam (ATIVAN) tablet 1 mg  1 mg SubLINGual Q4H PRN    LORazepam (ATIVAN) injection 1 mg  1 mg IntraMUSCular Q4H PRN    morphine injection 4 mg  4 mg SubCUTAneous Q30MIN PRN    haloperidol lactate (HALDOL) injection 2 mg  2 mg SubCUTAneous Q1H PRN    diphenhydrAMINE (BENADRYL) injection 25 mg  25 mg IntraMUSCular Q6H PRN    acetaminophen (TYLENOL) suppository 650 mg  650 mg Rectal Q3H PRN    bisacodyl (DULCOLAX) suppository 10 mg  10 mg Rectal PRN    haloperidol lactate (HALDOL) injection 2 mg  2 mg SubCUTAneous Q1H PRN    albuterol-ipratropium (DUO-NEB) 2.5 MG-0.5 MG/3 ML  3 mL Nebulization Q4H PRN    glycopyrrolate (ROBINUL) injection 0.2 mg  0.2 mg SubCUTAneous Q4H PRN     Admitted with acute hypoxic respiratory failure for management of pain, dyspnea and delirium.     1. Pain: Fentanyl as ordered. Morphine as ordered. 2.. Dyspnea: Morphine as ordered. Glycopyrrolate prn secretions. Duonebs prn.      3. Delirium: Haldol and Ativan as ordered.     4. Family/Pt Support: Family at bedside during exam. Medications and plan of care discussed with nursing staff and family. Will continue to monitor for symptoms and adjust medications as needed to maintain patient comfort. PPS 20%. Case discussed with Dr. Cassidy Brenner.     Signed By: Balbir Kilgore NP     January 28, 2017

## 2017-01-29 NOTE — PROGRESS NOTES
Summary Note- Patient is lethargic, yet responsive to pain. Required SQ PRN medications for pain and agitation. Mouth care only. Prater to gravity; small bowel movement this shift. Bilateral feet are warm and dusky. Patient safety maintained through hourly rounding: bed low and locked, side rails x2, tab alerts on, call light within reach, and door open for continuous monitoring.

## 2017-01-29 NOTE — PROGRESS NOTES
Report taken from off going nurse, safety round completed, patient identified by name and . Patient resting in bed, no s/s of pain, dyspnea, agitation or n/v. Fentanyl 125 mcg transdermal patch intact, tab alarm on, call light within reach, bed lowered and locked, continue monitoring.

## 2017-01-30 NOTE — [facility] CHAPLAIN NOTES
Patient: Melissa Edmonds    Date: 01/30/17  Time: 12:50 PM    [facility]  Notes     continues to be available for patient and family. Since last visit  has spoken to daughter on a couple of occassions outside the room. Family is at peace with patient's impending death. Yajaira's son in law has cancer and is under hospice care. The family appears to bee dealing well with all the stress.          Signed by: Zakia Garcia

## 2017-01-30 NOTE — [facility] SOCIAL WORKER NOTES
Patient: Fredi Rosa    Date: 01/30/17  Time: 12:47 PM    [facility]  Notes  Today,  emotional support was provided to daughter. Pt continues to decline. Family is surprised at how long she has survived without food. Family states she has not had any food in 2 weeks.           Signed by: Paul Massey

## 2017-01-30 NOTE — [facility] NURSE NOTES
Patient: Radha Timmons    Date: 01/30/17  Time: 12:52 PM    [facility] Nurse Notes    UPDATE: patient remains on fentanyl patch 125 mcg and receiving PRN morphine injectables to manage breakthrough pain; minimal fluid intake; urine output dropped over the weekend and only 200 past 24 hours; newly febrile and an increase in agitation overnight requiring PRN injectable haldol; ongoing emotional support to family    Goals of care: effectiveness of symptom management continuously evaluated to achieve optimum symptom management        Signed by: Rayshawn Ashby

## 2017-01-30 NOTE — [facility] IDG MASTER NOTE
Hospice Interdisciplinary Group Collaborative  Date: 01/31/17  Time: 12:01 PM    ___________________    Patient: Melissa Edmonds      ___________________    Diagnoses:  Diagnoses of Neuralgia and neuritis and Spinal stenosis of lumbar region were pertinent to this visit. Current Medications:    Current Facility-Administered Medications:     fentaNYL (DURAGESIC) 25 mcg/hr patch 1 Patch, 1 Patch, TransDERmal, Q72H, 1 Patch at 01/28/17 1102 **AND** fentaNYL (DURAGESIC) 100 mcg/hr patch 1 Patch, 1 Patch, TransDERmal, Q72H, Angelelder Moe, NP, 1 Patch at 01/28/17 1101    LORazepam (ATIVAN) tablet 1 mg, 1 mg, SubLINGual, Q4H PRN, Alex Cain, NP, 1 mg at 01/25/17 1349    LORazepam (ATIVAN) injection 1 mg, 1 mg, IntraMUSCular, Q4H PRN, Alex Cain NP, 1 mg at 01/30/17 0012    morphine injection 4 mg, 4 mg, SubCUTAneous, Q30MIN PRN, 4 mg at 01/31/17 0938 **OR** [DISCONTINUED] morphine injection 2 mg, 2 mg, IntraVENous, Q20MIN PRN, Alex Cain NP    haloperidol lactate (HALDOL) injection 2 mg, 2 mg, SubCUTAneous, Q1H PRN, Alex Cain, NP, 2 mg at 01/25/17 1103    diphenhydrAMINE (BENADRYL) injection 25 mg, 25 mg, IntraMUSCular, Q6H PRN, Alex Cain, NP    acetaminophen (TYLENOL) suppository 650 mg, 650 mg, Rectal, Q3H PRN, Alex Cain, NP    bisacodyl (DULCOLAX) suppository 10 mg, 10 mg, Rectal, PRN, Alex Cain, LORENZO, 10 mg at 01/24/17 1005    haloperidol lactate (HALDOL) injection 2 mg, 2 mg, SubCUTAneous, Q1H PRN, Alex Cain, NP, 2 mg at 01/31/17 0937    albuterol-ipratropium (DUO-NEB) 2.5 MG-0.5 MG/3 ML, 3 mL, Nebulization, Q4H PRN, Alex Cain NP    glycopyrrolate (ROBINUL) injection 0.2 mg, 0.2 mg, SubCUTAneous, Q4H PRN, Alex Cain NP, 0.2 mg at 01/16/17 0840    Orders: Allergies:   Allergies   Allergen Reactions    Lortab [Hydrocodone-Acetaminophen] Unknown (comments) and Nausea and Vomiting       ___________________    Care Team Notes          POC/IDG Notes [address] [place] [address] Nurse Notes by Tara Davenport at 01/30/17 1252  Version 1 of 1    Author:  Tara Davenport Service:  Lucina Ingram Author Type:  Registered Nurse    Filed:  01/30/17 1256 Date of Service:  01/30/17 1252 Status:  Signed    :  Tara Davenport (Registered Nurse)           Patient: Caity Willson    Date: 01/30/17  Time: 12:52 PM    [address] [place] [address] Nurse Notes    UPDATE: patient remains on fentanyl patch 125 mcg and receiving PRN morphine injectables to manage breakthrough pain; minimal fluid intake; urine output dropped over the weekend and only 200 past 24 hours; newly febrile and an increase in agitation overnight requiring PRN injectable haldol; ongoing emotional support to family    Goals of care: effectiveness of symptom management continuously evaluated to achieve optimum symptom management        Signed by: Tara Davenport       [address] [place] [address]  Notes by Audra Wu at 01/30/17 1250  Version 1 of 1    Author:  Audra Wu Service:  Spiritual Care Author Type:  Pastoral Care    Filed:  01/30/17 1254 Date of Service:  01/30/17 1250 Status:  Signed    :  Audra Wu (Pastoral Care)           Patient: Caity Willson    Date: 01/30/17  Time: 12:50 PM    [facility]  Notes     continues to be available for patient and family. Since last visit  has spoken to daughter on a couple of occassions outside the room. Family is at peace with patient's impending death. Yajaira's son in law has cancer and is under hospice care. The family appears to bee dealing well with all the stress.          Signed by: Audra Wu       [address] [place] [address]  Notes by Madison Messer at 01/30/17 1247  Version 1 of 1    Author:  Madison Messer Service:  Lucina Ingram Author Type:      Filed:  01/30/17 5602 Date of Service:  01/30/17 1247 Status:  Signed    :  Madison Messer ()           Patient: Caity Willson    Date: 01/30/17  Time: 12:47 PM    [address] [place] [address]  Notes  Today,  emotional support was provided to daughter. Pt continues to decline. Family is surprised at how long she has survived without food. Family states she has not had any food in 2 weeks. Signed by: Kirstie Marks       [facility] IDG  Notes by Kirstie Marks at 01/27/17 1234  Version 1 of 1    Author:  Kirstie Marks Service:  Feliciano Villa Author Type:      Filed:  01/27/17 1238 Date of Service:  01/27/17 1234 Status:  Signed    :  Kirstie Marks ()           Patient: Baker Essex    Date: 01/27/17  Time: 12:34 PM    [facility]  Notes  LMSW to continue with support to the Vannie Chew the daughter. She does not leave her bedside very often. Pt was awake today. Pt is still showing signs of agitation and pain. Signed by: Kirstie Marks       [facility] IDG  Notes by Jabier Archibald at 01/27/17 1230  Version 1 of 1    Author:  Jabier Archibald Service:  Spiritual Care Author Type:  Pastoral Care    Filed:  01/27/17 1238 Date of Service:  01/27/17 1230 Status:  Signed    :  Jabier Archibald (Pastoral Care)           Patient: Ashley Cardozoex    Date: 01/27/17  Time: 12:30 PM    [facility]  Notes    's last encounter with the was in the cafeteria. Daughter and son in law continue to depend on God to give them strength for the journey. They are at peace with mom's impending death. No new spiritual issues.          Signed by: Jabier Archibald       [facility] IDG Volunteer Notes by Rocco Ganser at 01/27/17 1151  Version 1 of 1    Author:  Rocco Ganser Service:  Feliciano Villa Author Type:  Hospice Volunteer/    Filed:  01/27/17 1152 Date of Service:  01/27/17 1151 Status:  Signed    :  Rocco Ganser (Hospice Volunteer/)               [place] [id number] of Care Review     Status Codes I = Initiated C=Continued R=Revised RS = Resolved     C Volunteer     Goal: Hospice house volunteer (s) enhances the quality of remaining life while patient is at the hospice house. Interventions: Candance Huger Candance Huger Clayton Macleod Volunteer (s) will provide companionship to the patient and/or family by visiting at the hospice house       . Candance Huger Clayton Macleod Volunteer (s) will provide respite as needed when requested by patient and/or family. Candance Huger Dwight Lien  Volunteer will provide activities such as music, reading, pet therapy, etc. as requested. Candance Huger Dwight Lien  Comfort bag delivered. Any other special requests or information regarding volunteer services:    13 visits recorded for prayer, pet therapy, companionship. No further needs identified at this time. These notes have been discussed in 8 [facility] meeting. [address] [place] [address]  Notes by Rico Murrieta at 01/23/17 1113  Version 1 of 1    Author:  Rico Murrieta Service:  Spiritual Care Author Type:  Pastoral Care    Filed:  01/23/17 1333 Date of Service:  01/23/17 1113 Status:  Signed    :  Rico Murrieta (Pastoral Care)           Patient: John Friedman    Date: 01/23/17  Time: 11:13 AM    [facility]  Notes    Patient and family care being provided. Patient and family are at peace with patient's impending death.  to offer emotional and spiritual support. Family is involved in a local [employer] and  is supportive. Patient's grandson in law continues to struggle with cancer. Signed by: Rico Murrieta       [address] [place] [address]  Notes by Elroy Vora at 01/23/17 1330  Version 1 of 1    Author:  Elroy Vora Service:  Belita Age Author Type:      Filed:  01/23/17 1333 Date of Service:  01/23/17 1330 Status:  Signed    :  Elroy Vora ()           Patient: John Friedman    Date: 01/23/17  Time: 1:30 PM    [facility]  Notes  LMSW will continue to visit with the family to provide emotional support.         Signed by: Elroy Vora       [address] [place] [address] Volunteer Notes by Lieutenant Benitez at 01/20/17 1443  Version 1 of 1    Author:  Lieutenant Benitez Service:  Rossy Carl Author Type:  Hospice Volunteer/    Filed:  01/20/17 1445 Date of Service:  01/20/17 1443 Status:  Signed    :  Lieutenant Benitez (Hospice Volunteer/)               [facility] Interdisciplinary Plan of Care Review     Status Codes I = Initiated C=Continued R=Revised RS = Resolved     C. Volunteer     Goal: Hospice house volunteer (s) enhances the quality of remaining life while patient is at the hospice house. Interventions: Katherene Means Katherene Means Clerance Sprinkles Volunteer (s) will provide companionship to the patient and/or family by visiting at the hospice house       . Katherene Means Clerance Sprinkles Volunteer (s) will provide respite as needed when requested by patient and/or family. Katherene Means Bharath Fetch  Volunteer will provide activities such as music, reading, pet therapy, etc. as requested. Katherene Means Bharath Fetch  Comfort bag delivered. Any other special requests or information regarding volunteer services:    Seven visits are recorded for prayer, companionship, and volunteer nursing assistance. Comfort bag delivered. Extra visits are requested for companionship. No further needs identified at this time. These notes have been discussed in 888 [facility] meeting. Signed by: Lieutenant Benitez       [facility] IDG  Notes by Aarti Hassan at 01/20/17 1412  Version 1 of 1    Author:  Aarti Hassan Service:  Rossy Carl Author Type:      Filed:  01/20/17 1414 Date of Service:  01/20/17 1412 Status:  Signed    :  Aarti Hassan ()           Patient: Coretta Peres    Date: 01/20/17  Time: 2:12 PM    [facility]  Notes  LMSW to continue to provide emotional support for the patient and her daughter. Odin Mays son in law has a brain tumor and he is on hospice as well.          Signed by: Aarti Hassan       [facility] IDG  Notes by Lena Gillette Mick Lai at 01/19/17 1618  Version 1 of 1    Author:  Jabier Archibald Service:  Spiritual Care Author Type:  Pastoral Care    Filed:  01/20/17 1414 Date of Service:  01/19/17 1618 Status:  Signed    :  Jabier Archibald (Pastoral Care)           Patient: Baker Essex    Date: 01/19/17  Time: 4:18 PM    [facility]  Notes     continues to provide spiritual and emotional support for patient and family. During my last visit with the patient she was able to talk, although her voice was much weaker. Patient's daughter came to 's office 1/8/17 and was able to express her feelings. She leans heavily on her clifford as her main source of strength. It is difficult having mom and son in law very sick at the same time. . She has told her daughter to stay at home with her  who is on hospice and take care of him. He has been able to visit patient once since she has been here but most of the time he is confined to home. Roz Becerril will continue to provide support throughout patient's stay at [facility]. Signed by: Jabier WILKINSONFINN [facility] IDG  Notes by Jabier Archibald at 01/16/17 1407  Version 1 of 1    Author:  Jabier Archibald Service:  Spiritual Care Author Type:  Pastoral Care    Filed:  01/16/17 1412 Date of Service:  01/16/17 1407 Status:  Signed    :  Jabier Archibald (Pastoral Care)           Patient: Baker Essex    Date: 01/16/17  Time: 2:07 PM    [facility]  Notes     continues to be available for patient and family. Patient's daughter continues to be at the bedside faithfully. Her  gives her a break at times. Family feels mom is ready to go   Be with God. Daughter is at peace with mom's impending death. Patient waxes and wanes. During last encounter patient was awake and communicating.        Signed by: Jabier WILKINSONFINN [facility] IDG Nurse Notes by Myles Saha at 01/16/17 1408  Version 1 of 1    Author:  Myles Saha Service:  Feliciano Villa Author Type: Registered Nurse    Filed:  01/16/17 1411 Date of Service:  01/16/17 1408 Status:  Signed    :  Tara Davenport (Registered Nurse)           Patient: Caity Willson    Date: 01/16/17  Time: 2:08 PM    [facility] Nurse Notes    UPDATE: fentanyl patch increased to 37.5 mcg over the weekend related to increased use of PRN medications; despite change, continues to receive a number of PRN injectable morphine doses to manage pain; continues receiving PRN injectable haldol for agitation and nausea - last episode of vomiting this morning; now receiving PRN glyco for secretions; ongoing family support     Goals of care: d/c roxanol; titrate fentanyl patch to 50 mcg to better manage pain with goal to minimize PRN injections; effectiveness of symptom management continuously evaluated to achieve optimum comfort and ultimately a peaceful death      Signed by: Tara Davenport       [facility] ANA PAULA  Notes by Madison Messer at 01/16/17 1407  Version 1 of 1    Author:  Madison Messer Service:  Lucina Ingram Author Type:      Filed:  01/16/17 1409 Date of Service:  01/16/17 1407 Status:  Signed    :  Madison Messer ()           Patient: Caity Willson    Date: 01/16/17  Time: 2:07 PM    [facility]  Notes  Daughter Olivier Bone at bedside. She vomited this am.  Family is always at bedside. LMSW continues to visit to provide emotional support, active listening and reassurance. Signed by: Madison Messer       [facility] ANA PAULA  Notes by Madison Messer at 01/13/17 1229  Version 1 of 1    Author:  Madison Messer Service:  Lucina Ingram Author Type:      Filed:  01/13/17 1235 Date of Service:  01/13/17 1229 Status:  Signed    :  Madison Messer ()           Patient: Caity Willson    Date: 01/13/17  Time: 12:29 PM    [facility]  Notes  LMSW will continue to provide emotional support. Pt was alert and oriented yesterday. Today she is barely opening her eyes. Family is usually at bedside. No financial concerns for family around final arrangements. Extra support to the daughter. Signed by: Pushpa Willett       [facility] Nurse Notes by Emanuel Rivers at 01/13/17 1230  Version 1 of 1    Author:  Emanuel Rivers Service:  Ledon Kocher Author Type:  Registered Nurse    Filed:  01/13/17 1234 Date of Service:  01/13/17 1230 Status:  Signed    :  Emanuel Rivers (Registered Nurse)           Patient: Myron Freeman    Date: 01/13/17  Time: 12:30 PM    [address] [place] [address] Nurse Notes    UPDATE: fentanyl patch increased to 25 mcg yesterday; since increase in patch, patient has required less but still requiring PRN morphine injectables for pain; continues to only take bites and sips; significantly less responsive overall than earlier this week; BP down into the 60's this morning; continues to receive PRN haldol injectables for agitation    Goals of care: will discontinue oral medications at this time; effectiveness of symptom management continuously evaluated to achieve optimum comfort and ultimately a peaceful death        Signed by: Emanuel Rivers       [address] [place] [address]  Notes by Leonel King at 01/12/17 1641  Version 1 of 1    Author:  Leonel King Service:  Spiritual Care Author Type:  Pastoral Care    Filed:  01/13/17 1233 Date of Service:  01/12/17 1641 Status:  Signed    :  Leonel King (Pastoral Care)           Patient: Myron Freeman    Date: 01/12/17  Time: 4:41 PM    [facility]  Notes     was quite surprised with my last encounter on 11/12/17. Patient was alert and quite able to communicate. She was oriented to herself, family and surroundings. She is grateful to be awake. She says the pain medicine causes her to sleep but she knows she needs that sleep. Visit was interrupted by Kaylin Gomez NP. Kaylin Gomez wanted  to keep talking while she examined her but  felt it was better to return at a later date.  will continue to be available for support to patient and family during patient's time at [facility]   According to Sandi Abbott, NP patient has changed today and is very sleepy again with little response. Signed by: Lars Brewer       [address] [place] [address] Volunteer Notes by Jennifer oCdy at 01/13/17 1157  Version 1 of 1    Author:  Jennifer Cody Service:  Kvng Ferreira Author Type:  Hospice Volunteer/    Filed:  01/13/17 1158 Date of Service:  01/13/17 1157 Status:  Signed    :  Jennifer Cody (Hospice Volunteer/)               [address] [facility] Interdisciplinary Plan of Care Review     Status Codes I = Initiated C=Continued R=Revised RS = Resolved     C Volunteer     Goal: Hospice house volunteer (s) enhances the quality of remaining life while patient is at the hospice house. Interventions: Qiana Morel Jak [place] Volunteer (s) will provide companionship to the patient and/or family by visiting at the hospice house       . Qiana Riser Jak [place] Volunteer (s) will provide respite as needed when requested by patient and/or family. Qiana Yang Taurus  Volunteer will provide activities such as music, reading, pet therapy, etc. as requested. Qiana Riser Trey Taurus  Comfort bag delivered. Any other special requests or information regarding volunteer services:   Six visits recorded for companionship, prayer, visits with family. Daughter enjoys company, per team. Volunteers notified. No further needs identified at this time. These notes have been discussed in 888 [facility] meeting.        [address] [place] [address]  Notes by Lars Brewer at 01/09/17 8037  Version 1 of 1    Author:  Lars Brewer Service:  Spiritual Care Author Type:  Pastoral Care    Filed:  01/09/17 1447 Date of Service:  01/09/17 0944 Status:  Signed    :  Lars Brewer (Pastoral Care)           Patient: No Berg    Date: 01/09/17  Time: 9:44 AM    [facility]  Notes   provided a safe place for patient's daughter to tell their recent journey. As she began to open up  learned much more than what the patient had gone through. This  family has had many deaths. Patient is one of 10 siblings. She and only one other are left. Patient's  is . Patient has a daughter who is . In addition to the stress of patient's grand son in law has a elizabeth tumor.  offered support with compassion, empathy, listening, reflection and prayer.  also spoke to bereavement coordinator about the various grief issues this family has faced.  to continued to provide support throughout patient's stay at [facility].         Signed by: Rosina Bruner       [facility] Nurse Notes by Kaylah Goldsmith at 17 144  Version 1 of 1    Author:  Kaylah Goldsmith Service:  Rene Bettencourt Author Type:  Registered Nurse    Filed:  17 1445 Date of Service:  17 Status:  Signed    :  Kaylah Goldsmith (Registered Nurse)           Patient: Lars Tucker    Date: 17  Time: 2:42 PM    [facility] Nurse Notes    UPDATE: patient not eating although taking sips at times; continues to see people in the room and asking for them to \"open the gate\"; continues to require PRN morphine injectables for breakthrough pain in addition to fentanyl 12mcg patch; receiving 3-4 doses of PRN haldol injectables daily; ongoing support for patient and family    Goals of care: effectiveness of symptom management continuously evaluated to achieve optimum comfort and ultimately a peaceful death        Signed by: Kaylah Goldsmith       [facility] IDG  Notes by Missy Cortes at 17 144  Version 1 of 1    Author:  Missy Cortes Service:  Rene Bettencourt Author Type:      Filed:  17 1444 Date of Service:  17 144 Status:  Signed    :  Missy Cortes ()           Patient: Lars Tucker    Date: 17  Time: 2:41 PM    [facility]  Notes  LMSW continues to offer emotional support. No community resources have been needed for the family or the pt. Pt continues to decline. Signed by: Arthurine Cranker       [facility] IDG Volunteer Notes by Courtney Mcgrath at 01/06/17 1301  Version 1 of 1    Author:  Courtney Mcgrath Service:  Mago Lr Author Type:  Hospice Volunteer/    Filed:  01/06/17 1301 Date of Service:  01/06/17 1301 Status:  Signed    :  Courtney Mcgrath (Hospice Volunteer/)               [place] [address] of Care Review     Status Codes I = Initiated C=Continued R=Revised RS = Resolved     I Volunteer     Goal: Hospice house volunteer (s) enhances the quality of remaining life while patient is at the hospice house. Interventions: Katlin Sommer Volunteer (s) will provide companionship to the patient and/or family by visiting at the hospice house       . Katlin Sommer Volunteer (s) will provide respite as needed when requested by patient and/or family. Lianne Barrio Leisa Barthel  Volunteer will provide activities such as music, reading, pet therapy, etc. as requested. Lianne Barrio Leisa Barthel  Comfort bag delivered. Any other special requests or information regarding volunteer services: Three visits recorded for prayer and companionship. No further needs identified at this time. These notes have been discussed in 888 [facility] meeting.        [address] [place] [address] IDG Nurse Notes by Pastora Stevens at 01/06/17 1234  Version 1 of 1    Author:  Pastora Stevens Service:  Mago Lr Author Type:  Registered Nurse    Filed:  01/06/17 1237 Date of Service:  01/06/17 1234 Status:  Signed    :  Pastora Stevens (Registered Nurse)           Patient: Heriberto Curtis    Date: 01/06/17  Time: 12:34 PM    [address] [place] [address] Nurse Notes    UPDATE: increased frequency of morphine required more for pain as opposed to dyspnea since the last IDG; fentanyl patch added and PRN morphine continues to be administered to manage pain and dyspnea; delirium presenting more requiring PRN haldol to manage    Goals of care: effectiveness of symptom management continuously evaluated to achieve optimum comfort      Signed by: Fariha Buckley       [address] IDG  Notes by Jose Price at 17 1234  Version 1 of 1    Author:  Jose Price Service:  Shi Zayas Author Type:  Pastoral Care    Filed:  17 1235 Date of Service:  17 1234 Status:  Signed    :  Jose Price (Pastoral Care)           Patient: Scott Duff    Date: 17  Time: 12:34 PM    Patient admitted on 17. Chaplain Allen and Lisy Taylor have both provided spiritual care for patient/family. Se documentation below for richmond Frausto's most recent report:     provided a safe place for patient's daughter to tell their recent journey. As she began to open up  learned much more than what the patient had gone through. I learned that the family has had many deaths. Patient is one of 10 siblings. She and only one other are left. Patient's  is . Patient has a daughter who is . In addition to the stress of patient's grand son in law has a elizabeth tumor. Their most resent turn of events has been the patient's decline. Patients daughter has done all that she knew possible to help her mom improve since she fell in October of last year. The patient went to rehab but while there continued the downward spiral. Daughter was in hopes of getting her mom to a spinal doctor. 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 Her organs began to be compromised and she continued to decline until it was determined she needed comfort rather than curative care. Family is at peace with patient being at Hospice. They are assured of her eternal destination.  During this visit  was able to listen and affirm daughters wonderful care, affirm her clifford and offer prayer for patient and family's journey including the grand son in law who has a brain tumor.      Following the visit  updated our Bereavement Coordinator, ARIANA Ackerman Div of the family's additional stressors. Signed by: Chantel Willingham       [address] [place] [address] IDG  Notes by Chato Rasmussen at 01/06/17 1231  Version 1 of 1    Author:  Chato Rasmussen Service:  Celio Carl Author Type:      Filed:  01/06/17 1233 Date of Service:  01/06/17 1231 Status:  Signed    :  Chato Rasmussen ()           Patient: Radha Timmons    Date: 01/06/17  Time: 12:31 PM    [facility]  Notes  Spoke to family about giving the \"LTC\" Bed  Up at [facility]. Family is ok with that. Daughter is feeling a little better with the decline of mom. Signed by: Chato Rasmussen       [address] [facility]  Notes by Chato Rasmussen at 01/03/17 1150  Version 1 of 1    Author:  Chato Rasmussen Service:  Celio Carl Author Type:      Filed:  01/03/17 1217 Date of Service:  01/03/17 1150 Status:  Signed    :  Chato Rasmussen ()           Patient: Radha Timmons    Date: 01/03/17  Time: 11:50 AM    [facility]  Notes  LMSW will visit to complete the initial assessment. The family was curious if they needed to cancel the LTC bed at [facility]. LMSW advised the family to not give up a bed for long term just yet. The family will be calling the facility to check on the patient's belonging's.   She was getting rehab before going to the hospital.         Signed by: Chato Rasmussen       [address] [facility] Nurse Notes by Rayshawn Ashby at 01/03/17 1205  Version 1 of 1    Author:  Rayshawn Ashby Service:  Celio Carl Author Type:  Registered Nurse    Filed:  01/03/17 1211 Date of Service:  01/03/17 1205 Status:  Signed    :  Rayshawn Ashby (Registered Nurse)           Patient: Radha Timmons    Date: 01/03/17  Time: 12:05 PM    [facility] Nurse Notes    [facility] admission to [facility] yesterday; patient minimally responsive with the exception of yes/no questions and observations of talking with beings unable to be seen by staff about how she is \"ready to go to heaven\"; noted aspiration with oral intake despite thickened and crushed medications; patient also noted to be dyspneic with speech; receiving PRN haldol for agitation/delirium and PRN morphine for dyspnea - both alternating between crushed and injectables; SW assessment pending    Goals of care: effectiveness of symptom management continuously evaluated to achieve optimum comfort        Signed by: Davis Tabor       [facility]  Notes by Angeles Melgar at 01/03/17 1038  Version 1 of 1    Author:  Angeles Melgar Service:  Spiritual Care Author Type:  Pastoral Care    Filed:  01/03/17 1200 Date of Service:  01/03/17 1038 Status:  Signed    :  Angeles Melgar (Pastoral Care)           Patient: Ana Tesfaye    Date: 01/03/17  Time: 10:38 AM    [facility]  Notes    Spiritual Care assessment completed on January 2, 2017 by Shemar Sevilla. Patient was initially at [facility] and when she became ill was hospitalized with pneumonia and sepsis. She is now [facility] level of care. Patient is of [employer]. During 's visit family shared a touching story of how patient led her daughter to Ailyn Potters is very important to this family. Family expressed feelings of peace with decisions for patient to be at [facility].  provide support with active listening, compassion and prayer. [employer] to follow up.          Signed by: Angeles Melgar       [facility] Volunteer Notes by Danay Varghese at 01/03/17 1124  Version 1 of 1    Author:  Danay Varghese Service:  J Luis Hurtado Author Type:  Hospice Volunteer/    Filed:  01/03/17 1124 Date of Service:  01/03/17 1124 Status:  Signed    :  Danay Varghese [facility] Volunteer/) [address] Review     Status Codes I = Initiated C=Continued R=Revised RS = Resolved     I Volunteer     Goal: Hospice house volunteer (s) enhances the quality of remaining life while patient is at the hospice house. Interventions: Vleas[employer] Elmore Volunteer (s) will provide companionship to the patient and/or family by visiting at the hospice house       . Vel[employer] [place] Volunteer (s) will provide respite as needed when requested by patient and/or family. Velasquez Stewart  Volunteer will provide activities such as music, reading, pet therapy, etc. as requested. Velasquez Stewart  Comfort bag delivered. Any other special requests or information regarding volunteer services:     No further needs identified at this time. These notes have been discussed in [facility] meeting.

## 2017-01-30 NOTE — PROGRESS NOTES
BSR received from [address]. Verified Fentanyl patches in place. Pt resting with eyes closed, respirations even and unlabored, no distress noted. Pt remains GIP level of care requiring injectable PRNS's for symptom management. Will continue to monitor symptoms as patient nears end of life, as well as provide support and education to family. Bed low and locked, tab alerts in place, family at bedside advised to call if assistance is needed.

## 2017-01-30 NOTE — PROGRESS NOTES
spoke with patients daughter Nara Pantoja and her . They shared patient's recent changes. They have a son in law who is in hospice with cancer. They are trying to juggle between being here and being with their daughter and her . They described symptoms of the son in law that sound very much like agitation. Yajaira's  remains ready at any moment to leave the [place] and go help his daughter. [address] offered words of encouragement, empathy and assurance of prayer. Considering all the stress Nara Pantoja and her  are coping well. Amelia Beltran will continue to offer support as needed, requested or referred. Routine visits as well. Bereavement Coordinator aware of the situation.

## 2017-01-30 NOTE — PROGRESS NOTES
LMSW visited with Hattie Espinal and Jailyn Ng in the room this am.  Jailyn Ng had just received a bath and a dose of medication according to Hattie Espinal. Danishfermín Espinal says pt has been here 28 days now and when we hand out the Gone from my sight book. We need to add \"this is not how Jailyn Ng did it\"  Danishfermín Espinal is at her mothers bedside every day. Hattie Espinal reports nothing to eat in 2 weeks and only a few spoonfuls of Coke recently. Pt opened her eyes and was mumbling something. Her eyes were half open and glassy. LMSW provided emotional support and also questioned if they needed anything.

## 2017-01-30 NOTE — PROGRESS NOTES
Report taken from off going nurse, safety round completed, patient identified by name and . Patient resting in bed, no s/s of pain, dyspnea, agitation or n/v. Fentanyl 125 mcg transdermal patch intact, family at bed side, tab alarm on, call light within reach, bed lowered and locked, continue monitoring.

## 2017-01-31 NOTE — PROGRESS NOTES
Progress Note    Patient: Dragan Cornejo MRN: [id number]  SSN: [id number]    YOB: 1933  Age: 80 y.o. Sex: female      Admit Date: 1/2/2017    LOS: 29 days     Subjective:     Pt poorly responsive. Increased use of pain and agitation meds over the last several days. Unable to consume PO safely. Dtr in need of additional support today as nursing reports that they anticipate that the son in law will also pass today. Review of Systems:  Review of systems not obtained due to patient factors. Objective:     Vitals:    01/30/17 0734 01/30/17 1557 01/31/17 0547 01/31/17 1611   BP: 122/58 93/43 (!) 86/32 112/49   Pulse: 72 70 74 76   Resp: 11 18 16    Temp: 99 °F (37.2 °C) 97.4 °F (36.3 °C) 96.5 °F (35.8 °C) 96.8 °F (36 °C)   Weight:       Height:            Intake and Output:  Current Shift: 01/31 0701 - 01/31 1900  In: -   Out: 400 [Urine:400]  Last three shifts:      Physical Exam:   Exam deferred as pt now resting quietly. Previously medicated per nursing staff due to increased agitation. Ongoing decline but no significant changes. Lab/Data Review:  No new labs resulted in the last 24 hours.       Assessment:     Principal Problem:    Acute respiratory failure with hypoxia ([facility] [address] (1/2/2017)        Plan:     Current Facility-Administered Medications   Medication Dose Route Frequency    fentaNYL (DURAGESIC) 25 mcg/hr patch 1 Patch  1 Patch TransDERmal Q72H    And    fentaNYL (DURAGESIC) 100 mcg/hr patch 1 Patch  1 Patch TransDERmal Q72H    LORazepam (ATIVAN) tablet 1 mg  1 mg SubLINGual Q4H PRN    LORazepam (ATIVAN) injection 1 mg  1 mg IntraMUSCular Q4H PRN    morphine injection 4 mg  4 mg SubCUTAneous Q30MIN PRN    haloperidol lactate (HALDOL) injection 2 mg  2 mg SubCUTAneous Q1H PRN    diphenhydrAMINE (BENADRYL) injection 25 mg  25 mg IntraMUSCular Q6H PRN    acetaminophen (TYLENOL) suppository 650 mg  650 mg Rectal Q3H PRN    bisacodyl (DULCOLAX) suppository 10 mg  10 mg Rectal PRN    haloperidol lactate (HALDOL) injection 2 mg  2 mg SubCUTAneous Q1H PRN    albuterol-ipratropium (DUO-NEB) 2.5 MG-0.5 MG/3 ML  3 mL Nebulization Q4H PRN    glycopyrrolate (ROBINUL) injection 0.2 mg  0.2 mg SubCUTAneous Q4H PRN     1. Admitted with acute hypoxic respiratory failure for management of dyspnea, delirium, and family/pt support.      2. Dyspnea: Morphine as ordered. Glycopyrrolate PRN secretions. Duonebs PRN. Continue Fentanyl 125 mcg/hr and appears to be managing pain well. 2 PRNs throughout the day.       3. Delirium: Haldol and Ativan as ordered PRN.     4. Family/Pt Support: No family at bedside during exam. Ongoing plan of care including medications discussed with primary RN.  contacted and requested that visit be made with the dtr as she is having increased needs for emotional support today. Continue to monitor and palliate symptoms as they arise. PPS 10%.      Signed By: Larisa Hernandez NP     January 31, 2017

## 2017-02-01 NOTE — PROGRESS NOTES
NOTE:  There were actually two encounters with patient's daughter this day, and coordination of care in between the two encounters. Bereavement Coordinator provided grief support to patient's daughter in response to report from volunteer that the son-in-law of patient's daughter has .    Care coordinated with  Shahida Mcmahan r/t desire of family to have volunteer sit with the patient during the time of the .  Arrangements have been made for volunteer to sit with the patient during the .

## 2017-02-01 NOTE — PROGRESS NOTES
Patient's daughter Randall Tolbert received news that her son in law only has hours left to live. Yajaira's  is with her daughter and grandchildren providing love and support. Randall Tolbert feels she must be with her mom at this time.  offered an opportunity for Randall Tolbert to reminisce  And reflect on her son in [facility] and the 18 years he has been with their family. There were tears and laughter as she went down [facility]. Randall Tolbert recalled when her son in law became a Scott Nguyen about 10 years ago. His Anisa brings the family much comfort and peace.  assured Randall Tolbert if she wanted to leave and go be with her daughter we would all take good care of mom. Yajaira's mother in law came to visit during our encounter. After a warm introduction  offered prayer at bedside and encouraged Randall Tolbert to call for additional support as needed.

## 2017-02-01 NOTE — PROGRESS NOTES
Reported off and completed walking rounds with the on coming RN this shift. The patient is resting with no signs of any distress observed.

## 2017-02-01 NOTE — PROGRESS NOTES
ID, bedside report rec'd. FLACC 0 at this time. RR 16, easy. Family at the bedside and report her restful.

## 2017-02-01 NOTE — PROGRESS NOTES
Progress Note    Patient: Ana M Duckworth MRN: [id number]  SSN: [id number]    YOB: 1933  Age: 80 y.o. Sex: female      Admit Date: 1/2/2017    LOS: 30 days     Subjective:     Pt resting quietly. Unable to consume PO safely. Dtr at bedside,     Review of Systems:  Review of systems not obtained due to patient factors. Objective:     Vitals:    01/30/17 1557 01/31/17 0547 01/31/17 1611 02/01/17 0700   BP: 93/43 (!) 86/32 112/49 99/54   Pulse: 70 74 76 74   Resp: 18 16     Temp: 97.4 °F (36.3 °C) 96.5 °F (35.8 °C) 96.8 °F (36 °C) 96.1 °F (35.6 °C)   Weight:       Height:            Intake and Output:  Current Shift:    Last three shifts: 01/30 1901 - 02/01 0700  In: -   Out: 400 [Urine:400]    Physical Exam:   GENERAL: no distress, appears stated age, moderately obese  LUNG: rhonchi coarse throughout all lung fields. HEART: regular rate and rhythm, S1, S2 normal, no murmur, click, rub or gallop  ABDOMEN: soft, non-tender. Bowel sounds diminished. No masses, no organomegaly  EXTREMITIES: edema to all extremities. + pedal pulses. SKIN: Normal and no rash or abnormalities. Warm to touch. NEUROLOGIC: Obtunded. Poorly responsive. Bed bound. Generally weakened and debilitated. Lab/Data Review:  No new labs resulted in the last 24 hours.       Assessment:     Principal Problem:    Acute respiratory failure with hypoxia (HCC) (1/2/2017)        Plan:     Current Facility-Administered Medications   Medication Dose Route Frequency    fentaNYL (DURAGESIC) 25 mcg/hr patch 1 Patch  1 Patch TransDERmal Q72H    And    fentaNYL (DURAGESIC) 100 mcg/hr patch 1 Patch  1 Patch TransDERmal Q72H    LORazepam (ATIVAN) tablet 1 mg  1 mg SubLINGual Q4H PRN    LORazepam (ATIVAN) injection 1 mg  1 mg IntraMUSCular Q4H PRN    morphine injection 4 mg  4 mg SubCUTAneous Q30MIN PRN    haloperidol lactate (HALDOL) injection 2 mg  2 mg SubCUTAneous Q1H PRN    diphenhydrAMINE (BENADRYL) injection 25 mg  25 mg IntraMUSCular Q6H PRN    acetaminophen (TYLENOL) suppository 650 mg  650 mg Rectal Q3H PRN    bisacodyl (DULCOLAX) suppository 10 mg  10 mg Rectal PRN    haloperidol lactate (HALDOL) injection 2 mg  2 mg SubCUTAneous Q1H PRN    albuterol-ipratropium (DUO-NEB) 2.5 MG-0.5 MG/3 ML  3 mL Nebulization Q4H PRN    glycopyrrolate (ROBINUL) injection 0.2 mg  0.2 mg SubCUTAneous Q4H PRN     1. Admitted with acute hypoxic respiratory failure for management of dyspnea, delirium, and family/pt support.      2. Dyspnea: Morphine as ordered. Glycopyrrolate PRN secretions. Duonebs PRN. Continue Fentanyl 125 mcg/hr and appears to be managing pain well. 2 PRNs throughout the day.       3. Delirium: Haldol and Ativan as ordered PRN.     4. Family/Pt Support: Dtr at bedside during exam. Ongoing plan of care including medications discussed with primary RN and dtr. Continue to monitor and palliate symptoms as they arise. PPS 10%.      Signed By: Devendra Patricio NP     February 1, 2017

## 2017-02-01 NOTE — PROGRESS NOTES
RR 18, she requests medication but cannot tell me what for.  + jameyce, mya.  + delirium. She is able to take only a few drops of fluid with HOB elevated, additional time and some throat clearing. Medicated.

## 2017-02-02 NOTE — PROGRESS NOTES
Progress Note    Patient: Zandra Land MRN: [id number]  SSN: [id number]    YOB: 1933  Age: 80 y.o. Sex: female      Admit Date: 1/2/2017    LOS: 31 days     Subjective:     Eyes closed, mumbling and agitated. NPO. Has required Morphine SQ x 4 for pain/dyspnea and Haldol x 2 SQ and Ativan x 1 IM for agitation. Family at bedside. Review of Systems:  Unable to assess. Objective:     Vitals:    02/01/17 0700 02/01/17 1714 02/02/17 0523 02/02/17 1615   BP: 99/54 131/59 95/46 121/60   Pulse: 74 75 73 74   Resp:  15 24 12   Temp: 96.1 °F (35.6 °C) 96.1 °F (35.6 °C) 97.4 °F (36.3 °C) 96.9 °F (36.1 °C)   Weight:       Height:            Intake and Output:  Current Shift: 02/02 0701 - 02/02 1900  In: -   Out: 290 [Urine:290]  Last three shifts: 01/31 1901 - 02/02 0700  In: -   Out: 700 [Urine:700]    Physical Exam:   GENERAL: Eyes open and mumbling, mild distress  LUNG: Coarse breath sounds with rhonchi with unlabored respirations  HEART: regular rate and rhythm, S1, S2 normal, no murmur, click, rub or gallop  ABDOMEN: soft, distended, non-tender. Bowel sounds hypoactive. : Prater catheter with cloudy manuel urine. EXTREMITIES: extremities normal with + pedal pulses. Mild generalized edema. SKIN: Normal. Warm to touch. NEUROLOGIC: Eyes closed and responds to stimuli. Unable to follow commands. Agitated. Lab/Data Review:  No new labs resulted in the last 24 hours.     Assessment:     Principal Problem:    Acute respiratory failure with hypoxia (HCC) (1/2/2017)        Plan:     Current Facility-Administered Medications   Medication Dose Route Frequency    fentaNYL (DURAGESIC) 25 mcg/hr patch 1 Patch  1 Patch TransDERmal Q72H    And    fentaNYL (DURAGESIC) 100 mcg/hr patch 1 Patch  1 Patch TransDERmal Q72H    LORazepam (ATIVAN) tablet 1 mg  1 mg SubLINGual Q4H PRN    LORazepam (ATIVAN) injection 1 mg  1 mg IntraMUSCular Q4H PRN    morphine injection 4 mg  4 mg SubCUTAneous Q30MIN PRN    haloperidol lactate (HALDOL) injection 2 mg  2 mg SubCUTAneous Q1H PRN    diphenhydrAMINE (BENADRYL) injection 25 mg  25 mg IntraMUSCular Q6H PRN    acetaminophen (TYLENOL) suppository 650 mg  650 mg Rectal Q3H PRN    bisacodyl (DULCOLAX) suppository 10 mg  10 mg Rectal PRN    haloperidol lactate (HALDOL) injection 2 mg  2 mg SubCUTAneous Q1H PRN    albuterol-ipratropium (DUO-NEB) 2.5 MG-0.5 MG/3 ML  3 mL Nebulization Q4H PRN    glycopyrrolate (ROBINUL) injection 0.2 mg  0.2 mg SubCUTAneous Q4H PRN     Admitted with acute hypoxic respiratory failure for management of pain, dyspnea and delirium.     1. Pain: Fentanyl as ordered. Morphine as ordered. 2.. Dyspnea: Morphine as ordered. Glycopyrrolate prn secretions. Duonebs prn.      3. Delirium: Haldol and Ativan as ordered.     4. Family/Pt Support: Family at bedside during exam. Medications and plan of care discussed with nursing staff and family. Will continue to monitor for symptoms and adjust medications as needed to maintain patient comfort. PPS 10%. Case discussed with Dr. Jeffery Leong.     Signed By: Yadira Higgins NP     February 2, 2017

## 2017-02-03 NOTE — [facility] IDG MASTER NOTE
[facility]  Date: 02/03/17  Time: 11:08 AM    ___________________    Patient: Yefri Andrews    ___________________    Diagnoses:  Diagnoses of Neuralgia and neuritis and Spinal stenosis of lumbar region were pertinent to this visit. Current Medications:    Current Facility-Administered Medications:     fentaNYL (DURAGESIC) 50 mcg/hr patch 1 Patch, 1 Patch, TransDERmal, Q72H, Analisa Berry NP, 1 Patch at 02/03/17 1414    [DISCONTINUED] fentaNYL (DURAGESIC) 25 mcg/hr patch 1 Patch, 1 Patch, TransDERmal, Q72H, 1 Patch at 02/03/17 1000 **AND** fentaNYL (DURAGESIC) 100 mcg/hr patch 1 Patch, 1 Patch, TransDERmal, Q72H, Larisa Hernandez NP, 1 Patch at 02/03/17 1048    LORazepam (ATIVAN) tablet 1 mg, 1 mg, SubLINGual, Q4H PRN, Analisa Berry NP, 1 mg at 02/01/17 0402    LORazepam (ATIVAN) injection 1 mg, 1 mg, IntraMUSCular, Q4H PRN, Analisa Berry NP, 1 mg at 02/01/17 0950    morphine injection 4 mg, 4 mg, SubCUTAneous, Q30MIN PRN, 4 mg at 02/03/17 1642 **OR** [DISCONTINUED] morphine injection 2 mg, 2 mg, IntraVENous, Q20MIN PRN, Analisa Berry NP    haloperidol lactate (HALDOL) injection 2 mg, 2 mg, SubCUTAneous, Q1H PRN, Analisa Berry NP, 2 mg at 01/25/17 1103    diphenhydrAMINE (BENADRYL) injection 25 mg, 25 mg, IntraMUSCular, Q6H PRN, Analisa Berry NP    acetaminophen (TYLENOL) suppository 650 mg, 650 mg, Rectal, Q3H PRN, Analisa Berry NP    bisacodyl (DULCOLAX) suppository 10 mg, 10 mg, Rectal, PRN, Analisa Berry NP, 10 mg at 02/03/17 1055    haloperidol lactate (HALDOL) injection 2 mg, 2 mg, SubCUTAneous, Q1H PRN, Analisa Berry NP, 2 mg at 02/03/17 1642    albuterol-ipratropium (DUO-NEB) 2.5 MG-0.5 MG/3 ML, 3 mL, Nebulization, Q4H PRN, Analisa Berry NP    glycopyrrolate (ROBINUL) injection 0.2 mg, 0.2 mg, SubCUTAneous, Q4H PRN, Analisa Berry NP, 0.2 mg at 01/16/17 0840    Orders: Allergies:   Allergies   Allergen Reactions    Lortab [Hydrocodone-Acetaminophen] Unknown (comments) and Nausea and Vomiting       ___________________    Care Team Notes          POC/IDG Notes      [facility] IDG  Notes by Angeles Melgar at 17 1030  Version 1 of 1    Author:  Angeles Melgar Service:  Spiritual Care Author Type:  Pastoral Care    Filed:  17 1308 Date of Service:  17 1030 Status:  Signed    :  Angeles Melgar (Pastoral Care)           Patient: Ana Tesfaye    Date: 17  Time: 10:30 AM    [facility]  Notes  Difficult week for the family. Patient's grand son in law  this week. Patient's daughter Quinn Grande has been more emotional throughout this time. She has gone today to attend the  while volunteers sit with her mom.  spoke to her as she left this morning offering a hug and prayer. She thanked  for being there for her as tears rolled down her cheeks. Patient appears to be declining. Patient's ability to interact is much more limited.          Signed by: Angeles Melgar       [facility] IDG Nurse Notes by Davis Tabor at 17 1303  Version 1 of 1    Author:  Davis Tabor Service:  J Luis Hurtado Author Type:  Registered Nurse    Filed:  17 1307 Date of Service:  17 1303 Status:  Signed    :  Davis Tabor (Registered Nurse)           Patient: Ana Tesfaye    Date: 17  Time: 1:03 PM    [facility] Nurse Notes    UPDATE: patient continues on fentanyl patch 125 mcg and requiring frequent PRN morphine injectables for breakthrough pain; continues to receive PRN injectable haldol for agitation and lorazepam for anxiety; ongoing support for family     Goals of care: increase fentanyl patch to 150 mcg for better long-acting pain medication; effectiveness of symptom management continuously evaluated to achieve optimum comfort            Signed by: Davis Tabor       [facility] IDG  Notes by Reese Colmenares at 17 1302  Version 1 of 1    Author:  Reese Colmenares Service:  HOSPICE Author Type:      Filed:  02/03/17 1306 Date of Service:  02/03/17 1302 Status:  Signed    :  Aarti Hassan ()           Patient: Coretta Peres    Date: 02/03/17  Time: 1:02 PM    [facility]  Notes  LMSW to continue to provide emotional support to pt and Zeny Dinero. Yajaira's son in [facility] was today. We had a volunteer sit with her while Zeny Dinero is gone. [facility] has been involved. Signed by: Aarti Hassan       [facility] IDG Volunteer Notes by Lieutenant Benitez at 02/03/17 1155  Version 1 of 1    Author:  Lieutenant Benitez Service:  Rossy Carl Author Type:  Hospice Volunteer/    Filed:  02/03/17 1156 Date of Service:  02/03/17 1155 Status:  Signed    :  Lieutenant Benitez (Hospice Volunteer/)               [facility] Interdisciplinary Plan of Care Review     Status Codes I = Initiated C=Continued R=Revised RS = Resolved     C. Volunteer     Goal: Hospice house volunteer (s) enhances the quality of remaining life while patient is at the hospice house. Interventions: Katherene Means Katherene Means Clerance Sprinkles Volunteer (s) will provide companionship to the patient and/or family by visiting at the hospice house       . Katherene Means Clerance Sprinkles Volunteer (s) will provide respite as needed when requested by patient and/or family. Katherene Means Bharath Fetch  Volunteer will provide activities such as music, reading, pet therapy, etc. as requested. Katherene Means Bharath Fetch  Comfort bag delivered. Any other special requests or information regarding volunteer services:     Multiple visits for prayer, companionship, pet therapy, and volunteer nursing assistance are recorded. No further needs identified at this time. These notes have been discussed in [facility] meeting.         Signed by: Lilia MCNALLYG Nurse Notes by Rose Vargas at 01/30/17 1252  Version 1 of 1    Author:  Rose Vargas Service:  Rossy Carl Author Type: Registered Nurse    Filed:  01/30/17 1256 Date of Service:  01/30/17 1252 Status:  Signed    :  Alan Dunn (Registered Nurse)           Patient: Keila Sandoval    Date: 01/30/17  Time: 12:52 PM    [facility] Nurse Notes    UPDATE: patient remains on fentanyl patch 125 mcg and receiving PRN morphine injectables to manage breakthrough pain; minimal fluid intake; urine output dropped over the weekend and only 200 past 24 hours; newly febrile and an increase in agitation overnight requiring PRN injectable haldol; ongoing emotional support to family    Goals of care: effectiveness of symptom management continuously evaluated to achieve optimum symptom management        Signed by: Alan Dunn       [facility] IDG  Notes by Mariela Mitchell at 01/30/17 1250  Version 1 of 1    Author:  Mariela Mitchell Service:  Spiritual Care Author Type:  Pastoral Care    Filed:  01/30/17 1254 Date of Service:  01/30/17 1250 Status:  Signed    :  Mariela Mitchell (Pastoral Care)           Patient: Keila Sandoval    Date: 01/30/17  Time: 12:50 PM    [facility] [place]  Notes     continues to be available for patient and family. Since last visit  has spoken to daughter on a couple of occassions outside the room. Family is at peace with patient's impending death. Yajaira's son in law has cancer and is under hospice care. The family appears to bee dealing well with all the stress. Signed by: Mariela Mitchell       [facility] IDG  Notes by Jolynn Mckeon at 01/30/17 1247  Version 1 of 1    Author:  Jolynn Mckeon Service:  Josh Smallwood Author Type:      Filed:  01/30/17 1254 Date of Service:  01/30/17 1247 Status:  Signed    :  Jolynn Mckeon ()           Patient: Keila Sandoval    Date: 01/30/17  Time: 12:47 PM    [facility] [place]  Notes  Today,  emotional support was provided to daughter. Pt continues to decline.   Family is surprised at how long she has survived without food. Family states she has not had any food in 2 weeks. Signed by: Yajaira Villa       [facility] IDG  Notes by Yajaira Villa at 01/27/17 1234  Version 1 of 1    Author:  Yajaira Villa Service:  Pallavi Mejia Author Type:      Filed:  01/27/17 1238 Date of Service:  01/27/17 1234 Status:  Signed    :  Yajaira Villa ()           Patient: Melissa Edmonds    Date: 01/27/17  Time: 12:34 PM    [facility]  Notes  LMSW to continue with support to the Abby Page the daughter. She does not leave her bedside very often. Pt was awake today. Pt is still showing signs of agitation and pain. Signed by: Yajaira Villa       [facility] IDG  Notes by Zakia Garcia at 01/27/17 1230  Version 1 of 1    Author:  Zakia Garcia Service:  Spiritual Care Author Type:  Pastoral Care    Filed:  01/27/17 1238 Date of Service:  01/27/17 1230 Status:  Signed    :  Zakia Garcia (Pastoral Care)           Patient: Melissa Edmonds    Date: 01/27/17  Time: 12:30 PM    [facility]  Notes    's last encounter with the was in the cafeteria. Daughter and son in law continue to depend on God to give them strength for the journey. They are at peace with mom's impending death. No new spiritual issues. Signed by: Zakia Garcia       [facility] IDG Volunteer Notes by Franki Gottlieb at 01/27/17 1151  Version 1 of 1    Author:  Franki Gottlieb Service:  Pallavi Mejia Author Type:  Hospice Volunteer/    Filed:  01/27/17 1152 Date of Service:  01/27/17 1151 Status:  Signed    :  Franki Gottlieb (Hospice Volunteer/)               [facility] Interdisciplinary Plan of Care Review     Status Codes I = Initiated C=Continued R=Revised RS = Resolved     C Volunteer     Goal: Hospice house volunteer (s) enhances the quality of remaining life while patient is at the hospice house. Interventions: Aurora Duke Volunteer (s) will provide companionship to the patient and/or family by visiting at the hospice house       . Aurora Duke Volunteer (s) will provide respite as needed when requested by patient and/or family. Aurora Avendaño  Volunteer will provide activities such as music, reading, pet therapy, etc. as requested. Aurora Gomez Oar  Comfort bag delivered. Any other special requests or information regarding volunteer services:    13 visits recorded for prayer, pet therapy, companionship. No further needs identified at this time. These notes have been discussed in [facility] meeting. [facility] IDG  Notes by Justino Bermudez at 01/23/17 1113  Version 1 of 1    Author:  Justino Bermudez Service:  Spiritual Care Author Type:  Pastoral Care    Filed:  01/23/17 1333 Date of Service:  01/23/17 1113 Status:  Signed    :  Justino Bermudez (Pastoral Care)           Patient: Sofia Clifton    Date: 01/23/17  Time: 11:13 AM    \[facility] [place] [facility]\""  Notes    Patient and family care being provided. Patient and family are at peace with patient's impending death.  to offer emotional and spiritual support. Family is involved in a local [employer] and  is supportive. Patient's grandson in law continues to struggle with cancer. Signed by: Justino Bermudez       [facility] IDG  Notes by Catherine Molina at 01/23/17 1330  Version 1 of 1    Author:  Catherine Molina Service:  Jayashree Ken Author Type:      Filed:  01/23/17 1333 Date of Service:  01/23/17 1330 Status:  Signed    :  Catherine Molina ()           Patient: Sofia Clifton    Date: 01/23/17  Time: 1:30 PM    \[facility] [place] [facility]\""  Notes  LMSW will continue to visit with the family to provide emotional support.         Signed by: Catherine Molina       \[facility] [place] [facility]\"" IDG Volunteer Notes by Sarah Velazquez at 01/20/17 4143  Version 1 of 1    Author:  Sarah Velazquez Service:  HOSPICE Author Type: Hospice Volunteer/    Filed:  01/20/17 1445 Date of Service:  01/20/17 1443 Status:  Signed    :  Sabina Higgins (Hospice Volunteer/)               [facility] Interdisciplinary Plan of Care Review     Status Codes I = Initiated C=Continued R=Revised RS = Resolved     C. Volunteer     Goal: Hospice house volunteer (s) enhances the quality of remaining life while patient is at the hospice house. Interventions: Morteza Weeks Volunteer (s) will provide companionship to the patient and/or family by visiting at the hospice house       . Morteza Weeks Volunteer (s) will provide respite as needed when requested by patient and/or family. Morteza Alvarado  Volunteer will provide activities such as music, reading, pet therapy, etc. as requested. Ethelene Gachino Juarez [place]le  Comfort bag delivered. Any other special requests or information regarding volunteer services:    Seven visits are recorded for prayer, companionship, and volunteer nursing assistance. Comfort bag delivered. Extra visits are requested for companionship. No further needs identified at this time. These notes have been discussed in [facility] meeting. Signed by: Sabina Higgins       [facility] IDG  Notes by Tyree Jaramillo at 01/20/17 1412  Version 1 of 1    Author:  Tyree Jaramillo Service:  Shelva Medal Author Type:      Filed:  01/20/17 1414 Date of Service:  01/20/17 1412 Status:  Signed    :  Tyree Jaramillo ()           Patient: Scott Duff    Date: 01/20/17  Time: 2:12 PM    [facility]  Notes  LMSW to continue to provide emotional support for the patient and her daughter. Shanti Kennedyer son in law has a brain tumor and he is on hospice as well.          Signed by: Tyree Jaramillo       [facility] IDG  Notes by Marcia Wong at 01/19/17 1618  Version 1 of 1    Author:  Marcia Wong Service:  Spiritual Care Author Type:  Pastoral Care Filed:  01/20/17 1414 Date of Service:  01/19/17 1618 Status:  Signed    :  Damon Briones (Pastoral Care)           Patient: Randall Evans    Date: 01/19/17  Time: 4:18 PM    [facility]  Notes     continues to provide spiritual and emotional support for patient and family. During my last visit with the patient she was able to talk, although her voice was much weaker. Patient's daughter came to 's office 1/8/17 and was able to express her feelings. She leans heavily on her clifford as her main source of strength. It is difficult having mom and son in law very sick at the same time. . She has told her daughter to stay at home with her  who is on hospice and take care of him. He has been able to visit patient once since she has been here but most of the time he is confined to home. Brian Nicole will continue to provide support throughout patient's stay at [facility]. Signed by: Damon Briones       [address] [place] [address]  Notes by Damon Briones at 01/16/17 1407  Version 1 of 1    Author:  Damon Briones Service:  Spiritual Care Author Type:  Pastoral Care    Filed:  01/16/17 1412 Date of Service:  01/16/17 1407 Status:  Signed    :  Damon Briones (Pastoral Care)           Patient: Randall Evans    Date: 01/16/17  Time: 2:07 PM    [facility] [place] [facility]  Notes     continues to be available for patient and family. Patient's daughter continues to be at the bedside faithfully. Her  gives her a break at times. Family feels mom is ready to go   Be with God. Daughter is at peace with mom's impending death. Patient waxes and wanes. During last encounter patient was awake and communicating.        Signed by: Damon Briones       [address] [facility] Nurse Notes by Jaylene Jhaveri at 01/16/17 1408  Version 1 of 1    Author:  Jaylene Jhaveri Service:  Mary Tucker Author Type:  Registered Nurse    Filed:  01/16/17 1411 Date of Service:  01/16/17 1408 Status:  Signed    :  Jaylene Jhaveri (Registered Nurse)           Patient: No Berg    Date: 01/16/17  Time: 2:08 PM    [address] [place] [address] Nurse Notes    UPDATE: fentanyl patch increased to 37.5 mcg over the weekend related to increased use of PRN medications; despite change, continues to receive a number of PRN injectable morphine doses to manage pain; continues receiving PRN injectable haldol for agitation and nausea - last episode of vomiting this morning; now receiving PRN glyco for secretions; ongoing family support     Goals of care: d/c roxanol; titrate fentanyl patch to 50 mcg to better manage pain with goal to minimize PRN injections; effectiveness of symptom management continuously evaluated to achieve optimum comfort and ultimately a peaceful death      Signed by: Sid Norwood       [address] [place] [address]  Notes by Shama Mcgrath at 01/16/17 1407  Version 1 of 1    Author:  Shama Mcgrath Service:  Kvng Ferreira Author Type:      Filed:  01/16/17 1409 Date of Service:  01/16/17 1407 Status:  Signed    :  Shama Mcgrath ()           Patient: No Berg    Date: 01/16/17  Time: 2:07 PM    \[facility]\""  Notes  Daughter Daniela Ruiz at bedside. She vomited this am.  Family is always at bedside. LMSW continues to visit to provide emotional support, active listening and reassurance. Signed by: Shama Mcgrath       [address] [place] [address]  Notes by Shama Mcgrath at 01/13/17 1229  Version 1 of 1    Author:  Shama Mcgrath Service:  Kvng Ferreira Author Type:      Filed:  01/13/17 1235 Date of Service:  01/13/17 1229 Status:  Signed    :  Shama Mcgrath ()           Patient: No Berg    Date: 01/13/17  Time: 12:29 PM    \[facility]\""  Notes  LMSW will continue to provide emotional support. Pt was alert and oriented yesterday. Today she is barely opening her eyes. Family is usually at bedside.   No financial concerns for family around final arrangements. Extra support to the daughter. Signed by: Mable Corral       [facility] [place] IDG Nurse Notes by Jeanne Cano at 01/13/17 1230  Version 1 of 1    Author:  Jeanne Cano Service:  Matt Markham Author Type:  Registered Nurse    Filed:  01/13/17 1234 Date of Service:  01/13/17 1230 Status:  Signed    :  Jeanne Cano (Registered Nurse)           Patient: Mounika Rey    Date: 01/13/17  Time: 12:30 PM    [facility] Nurse Notes    UPDATE: fentanyl patch increased to 25 mcg yesterday; since increase in patch, patient has required less but still requiring PRN morphine injectables for pain; continues to only take bites and sips; significantly less responsive overall than earlier this week; BP down into the 60's this morning; continues to receive PRN haldol injectables for agitation    Goals of care: will discontinue oral medications at this time; effectiveness of symptom management continuously evaluated to achieve optimum comfort and ultimately a peaceful death        Signed by: Jeanne Cano       [facility] IDG  Notes by Karyle Richard at 01/12/17 1641  Version 1 of 1    Author:  Karyle Richard Service:  Spiritual Care Author Type:  Pastoral Care    Filed:  01/13/17 1233 Date of Service:  01/12/17 1641 Status:  Signed    :  Karyle Richard (Pastoral Care)           Patient: Mounika Rey    Date: 01/12/17  Time: 4:41 PM    [facility] [place]  Notes     was quite surprised with my last encounter on 11/12/17. Patient was alert and quite able to communicate. She was oriented to herself, family and surroundings. She is grateful to be awake. She says the pain medicine causes her to sleep but she knows she needs that sleep. Visit was interrupted by Enrico Huynh NP. Enrico Huynh wanted  to keep talking while she examined her but  felt it was better to return at a later date.    will continue to be available for support to patient and family during patient's time at [facility] According to Geena Byrne, NP patient has changed today and is very sleepy again with little response. Signed by: Santhosh Jain       [facility] IDG Volunteer Notes by Yasir Chow at 01/13/17 1157  Version 1 of 1    Author:  Yasir Chow Service:  Ignacio Farah Author Type:  Hospice Volunteer/    Filed:  01/13/17 1158 Date of Service:  01/13/17 1157 Status:  Signed    :  Yasir Chow (Hospice Volunteer/)               [facility] Interdisciplinary Plan of Care Review     Status Codes I = Initiated C=Continued R=Revised RS = Resolved     C Volunteer     Goal: Hospice house volunteer (s) enhances the quality of remaining life while patient is at the hospice house. Interventions: Issa Porras Volunteer (s) will provide companionship to the patient and/or family by visiting at the hospice house       . Issa Porras Volunteer (s) will provide respite as needed when requested by patient and/or family. Issa Goodson  Volunteer will provide activities such as music, reading, pet therapy, etc. as requested. Issa Goodson  Comfort bag delivered. Any other special requests or information regarding volunteer services:   Six visits recorded for companionship, prayer, visits with family. Daughter enjoys company, per team. Volunteers notified. No further needs identified at this time. These notes have been discussed in [facility] meeting. [facility] IDG  Notes by Santhosh Jain at 01/09/17 0770  Version 1 of 1    Author:  Santhosh Jain Service:  Spiritual Care Author Type:  Pastoral Care    Filed:  01/09/17 1447 Date of Service:  01/09/17 0944 Status:  Signed    :  Santhosh Jain (Pastoral Care)           Patient: Mark Moore    Date: 01/09/17  Time: 9:44 AM    [facility]  Notes   provided a safe place for patient's daughter to tell their recent journey.  As she began to open up  learned much more than what the patient had gone through. This  family has had many deaths. Patient is one of 10 siblings. She and only one other are left. Patient's  is . Patient has a daughter who is . In addition to the stress of patient's grand son in law has a elizabeth tumor.  offered support with compassion, empathy, listening, reflection and prayer.  also spoke to bereavement coordinator about the various grief issues this family has faced.  to continued to provide support throughout patient's stay at [facility]. Signed by: Santhosh Jain       [facility] IDG Nurse Notes by Kayla Carlson at 17 144  Version 1 of 1    Author:  Kayla Carlson Service:  Ignacio Farah Author Type:  Registered Nurse    Filed:  17 144 Date of Service:  17 Status:  Signed    :  Kayla Carlson (Registered Nurse)           Patient: Mark Moore    Date: 17  Time: 2:42 PM    [facility] Nurse Notes    UPDATE: patient not eating although taking sips at times; continues to see people in the room and asking for them to \"open the gate\"; continues to require PRN morphine injectables for breakthrough pain in addition to fentanyl 12mcg patch; receiving 3-4 doses of PRN haldol injectables daily; ongoing support for patient and family    Goals of care: effectiveness of symptom management continuously evaluated to achieve optimum comfort and ultimately a peaceful death        Signed by: Kayla Carlson       [facility] IDG  Notes by Melisa Guillen at 17 144  Version 1 of 1    Author:  Melisa Guillen Service:  Ignacio Farah Author Type:      Filed:  17 1444 Date of Service:  17 144 Status:  Signed    :  Melisa Guillen ()           Patient: Mark Moore    Date: 17  Time: 2:41 PM    [facility]  Notes  LMSW continues to offer emotional support. No community resources have been needed for the family or the pt. Pt continues to decline. Signed by: Jolynn Mckeon       [facility] IDG Volunteer Notes by Samina Jackson at 01/06/17 1301  Version 1 of 1    Author:  Samina Jackson Service:  Josh Smallwood Author Type:  Hospice Volunteer/    Filed:  01/06/17 1301 Date of Service:  01/06/17 1301 Status:  Signed    :  Samina Jackson (Hospice Volunteer/)               [place] [address] of Care Review     Status Codes I = Initiated C=Continued R=Revised RS = Resolved     I Volunteer     Goal: Hospice house volunteer (s) enhances the quality of remaining life while patient is at the hospice house. Interventions: Kate Estes Volunteer (s) will provide companionship to the patient and/or family by visiting at the hospice house       . Kate Estes Volunteer (s) will provide respite as needed when requested by patient and/or family. Kate Vasquez  Volunteer will provide activities such as music, reading, pet therapy, etc. as requested. Kate White Layman  Comfort bag delivered. Any other special requests or information regarding volunteer services: Three visits recorded for prayer and companionship. No further needs identified at this time. These notes have been discussed in [facility] meeting.        [facility] IDG Nurse Notes by Alan Dunn at 01/06/17 1234  Version 1 of 1    Author:  Alan Dunn Service:  Josh Smallwood Author Type:  Registered Nurse    Filed:  01/06/17 1237 Date of Service:  01/06/17 1234 Status:  Signed    :  Alan Dunn (Registered Nurse)           Patient: Keila Sandoval    Date: 01/06/17  Time: 12:34 PM    [facility] Nurse Notes    UPDATE: increased frequency of morphine required more for pain as opposed to dyspnea since the last IDG; fentanyl patch added and PRN morphine continues to be administered to manage pain and dyspnea; delirium presenting more requiring PRN haldol to manage    Goals of care: effectiveness of symptom management continuously evaluated to achieve optimum comfort      Signed by: Sascha MONET [facility] IDG  Notes by Celeste Clarke at 17 1234  Version 1 of 1    Author:  Celeste Clarke Service:  Pallavi Mejia Author Type:  Pastoral Care    Filed:  17 1235 Date of Service:  17 1234 Status:  Signed    :  Celeste Clarke (Pastoral Care)           Patient: Melissa Edmonds    Date: 17  Time: 12:34 PM    Patient admitted on 17.  Eufemia Jimenez and Faraz Hunt have both provided spiritual care for patient/family. Se documentation below for Chaplain Frausto's most recent report:     provided a safe place for patient's daughter to tell their recent journey. As she began to open up  learned much more than what the patient had gone through. I learned that the family has had many deaths. Patient is one of 10 siblings. She and only one other are left. Patient's  is . Patient has a daughter who is . In addition to the stress of patient's grand son in law has a elizabeth tumor. Their most resent turn of events has been the patient's decline. Patients daughter has done all that she knew possible to help her mom improve since she fell in October of last year. The patient went to rehab but while there continued the downward spiral. Daughter was in hopes of getting her mom to a spinal doctor. 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 Her organs began to be compromised and she continued to decline until it was determined she needed comfort rather than curative care. Family is at peace with patient being at Hospice. They are assured of her eternal destination.  During this visit  was able to listen and affirm daughters wonderful care, affirm her clifford and offer prayer for patient and family's journey including the grand son in law who has a brain tumor.      Following the visit  updated our Bereavement Coordinator, ARIANA Marquez Div of the family's additional stressors. Signed by: Joseline Clayton       [facility] IDG  Notes by Sofia Batista at 01/06/17 1231  Version 1 of 1    Author:  Sofia Batista Service:  Chucky Dewitt Author Type:      Filed:  01/06/17 1233 Date of Service:  01/06/17 1231 Status:  Signed    :  Sofia Batista ()           Patient: Danielle Bustos    Date: 01/06/17  Time: 12:31 PM    [facility]  Notes  Spoke to family about giving the \"LTC\" Bed  Up at [facility]. Family is ok with that. Daughter is feeling a little better with the decline of mom. Signed by: Sofia Batista       [facility] IDG  Notes by Sofia Batista at 01/03/17 1150  Version 1 of 1    Author:  Sofia Batista Service:  Chucky Dewitt Author Type:      Filed:  01/03/17 1217 Date of Service:  01/03/17 1150 Status:  Signed    :  Sofia Batista ()           Patient: Danielle Bustos    Date: 01/03/17  Time: 11:50 AM    [facility]  Notes  LMSW will visit to complete the initial assessment. The family was curious if they needed to cancel the LTC bed at [facility]. LMSW advised the family to not give up a bed for long term just yet. The family will be calling the facility to check on the patient's belonging's.   She was getting rehab before going to the hospital.         Signed by: Sofia Batista       [facility] IDG Nurse Notes by Guillermina Torres at 01/03/17 1205  Version 1 of 1    Author:  Guillermina Torres Service:  Chucky Dewitt Author Type:  Registered Nurse    Filed:  01/03/17 1211 Date of Service:  01/03/17 1205 Status:  Signed    :  Guillermina Torres (Registered Nurse)           Patient: Danielle Bustos    Date: 01/03/17  Time: 12:05 PM    [facility] Nurse Notes    1st IDG since [facility]; patient minimally responsive with the exception of yes/no questions and observations of talking with beings unable to be seen by staff about how she is \"ready to go to heaven\"; noted aspiration with oral intake despite thickened and crushed medications; patient also noted to be dyspneic with speech; receiving PRN haldol for agitation/delirium and PRN morphine for dyspnea - both alternating between crushed and injectables; SW assessment pending    Goals of care: effectiveness of symptom management continuously evaluated to achieve optimum comfort        Signed by: Rose Vargas       [facility]  Notes by Vani Guerrero at 01/03/17 1038  Version 1 of 1    Author:  Vani Guerrero Service:  Spiritual Care Author Type:  Pastoral Care    Filed:  01/03/17 1200 Date of Service:  01/03/17 1038 Status:  Signed    :  Vani Guerrero (Pastoral Care)           Patient: Coretta Peres    Date: 01/03/17  Time: 10:38 AM    hospitals  Notes    Spiritual Care assessment completed on January 2, 2017 by Tania Morales. Patient was initially at [facility] and when she became ill was hospitalized with pneumonia and sepsis. She is now [facility] level of care. Patient is of [employer]. During 's visit family shared a touching story of how patient led her daughter to Angel Andrews is very important to this family. Family expressed feelings of peace with decisions for patient to be at [facility].  provide support with active listening, compassion and prayer. [employer] to follow up.          Signed by: Vani Guerrero       [facility] IDG Volunteer Notes by Luis Daniel Mcgregor at 01/03/17 1124  Version 1 of 1    Author:  Luis Daniel Mcgregor Service:  Rossy Carl Author Type:  Hospice Volunteer/    Filed:  01/03/17 1124 Date of Service:  01/03/17 1124 Status:  Signed    :  Luis Daniel Mcgregor (Hospice Volunteer/)               [address] [facility] Interdisciplinary Plan of Care Review     Status Codes I = Initiated C=Continued R=Revised RS = Resolved     I Volunteer     Goal: Hospice house volunteer (s) enhances the quality of remaining life while patient is at the hospice house. Interventions: Salma Skelton Volunteer (s) will provide companionship to the patient and/or family by visiting at the hospice house       . Salam Skelton Volunteer (s) will provide respite as needed when requested by patient and/or family. Salma Maravilla  Volunteer will provide activities such as music, reading, pet therapy, etc. as requested. Salma Maravilla  Comfort bag delivered. Any other special requests or information regarding volunteer services:     No further needs identified at this time. These notes have been discussed in [facility] meeting.

## 2017-02-03 NOTE — [facility] IDG CHAPLAIN NOTES
Patient: Josephine Wild    Date: 17  Time: 10:30 AM    [facility]  Notes  Difficult week for the family. Patient's grand son in law  this week. Patient's daughter Paula Salas has been more emotional throughout this time. She has gone today to attend the  while volunteers sit with her mom.  spoke to her as she left this morning offering a hug and prayer. She thanked  for being there for her as tears rolled down her cheeks. Patient appears to be declining. Patient's ability to interact is much more limited.          Signed by: Elsi Villasenor

## 2017-02-03 NOTE — [facility] IDG SOCIAL WORKER NOTES
Patient: Savannah Gil    Date: 02/03/17  Time: 1:02 PM    [facility]  Notes  LMSW to continue to provide emotional support to pt and Nara Pantoja. Yajaira's son in [place] [address] was today. We had a volunteer sit with her while Nara Pantoja is gone. [facility] has been involved.           Signed by: Ekaterina Boucher

## 2017-02-03 NOTE — [facility] IDG NURSE NOTES
Patient: Savannah Gil    Date: 02/03/17  Time: 1:03 PM    \[facility] [place] [facility]\"" Nurse Notes    UPDATE: patient continues on fentanyl patch 125 mcg and requiring frequent PRN morphine injectables for breakthrough pain; continues to receive PRN injectable haldol for agitation and lorazepam for anxiety; ongoing support for family     Goals of care: increase fentanyl patch to 150 mcg for better long-acting pain medication; effectiveness of symptom management continuously evaluated to achieve optimum comfort            Signed by: Marcos Graham

## 2017-02-03 NOTE — PROGRESS NOTES
RN called to room by CNA for noticing hard stool in rectum. Pt had to be digitally disimpacted. Suppository was not effective to promote self BM. Pt continuously moaning with flushed face and furled brow. Medicated pre and post procedure. RN remained at bedside for a time to ensure pain controlled. Pain appears to be dissipating.

## 2017-02-03 NOTE — [facility] IDG VOLUNTEER NOTES
[address] Review     Status Codes I = Initiated C=Continued R=Revised RS = Resolved     C. Volunteer     Goal: Hospice house volunteer (s) enhances the quality of remaining life while patient is at the hospice house. Interventions: Rayshawn Arayudymonse Rayshawn Petersl [facility] Volunteer (s) will provide companionship to the patient and/or family by visiting at the hospice house       . Rayshawn Petersl [employer] Volunteer (s) will provide respite as needed when requested by patient and/or family. Rayshawnjuli Bullockerel Sissy Bound  Volunteer will provide activities such as music, reading, pet therapy, etc. as requested. Rayshawnjuli Bullockerel Sissy Bound  Comfort bag delivered. Any other special requests or information regarding volunteer services:     Multiple visits for prayer, companionship, pet therapy, and volunteer nursing assistance are recorded. No further needs identified at this time. These notes have been discussed in [facility] meeting.         Signed by: Michael Marcelo

## 2017-02-03 NOTE — PROGRESS NOTES
Assessment completed. -  Trace edema in the extremities  +1. Prater  Intact 16 fr. Un- responding to her surrounding. Family members remain at the bed side. Bowel sounds sluggish. In all 4 quadrants.,  Heart rate regular sinus,  Lungs clear/ diminished.

## 2017-02-03 NOTE — PROGRESS NOTES
Patient resting quietly, no s/sx of nausea/vomiting, pain, respiratory distress, agitation, anxiety present. Family at bedside.

## 2017-02-03 NOTE — PROGRESS NOTES
Progress Note    Patient: Malick Triana MRN: [id number]  SSN: [id number]    YOB: 1933  Age: 80 y.o. Sex: female      Admit Date: 2017    LOS: 32 days     Subjective:     Obtunded. NPO. Has required Morphine SQ x 5 for pain/dyspnea and Haldol x 1 SQ for agitation. Volunteer at bedside. Daughter Emma Crain is attending  of her son-in-law. Review of Systems:  Unable to assess. Objective:     Vitals:    17 1714 17 0523 17 1615 17 0703   BP: 131/59 95/46 121/60 111/53   Pulse: 75 73 74 80   Resp: 15 24 12 18   Temp: 96.1 °F (35.6 °C) 97.4 °F (36.3 °C) 96.9 °F (36.1 °C)    Weight:       Height:            Intake and Output:  Current Shift: 701 - 1900  In: -   Out: 1500 [Urine:1500]  Last three shifts: 1901 -  07  In: -   Out: 490 [Urine:490]    Physical Exam:   GENERAL: Obtunded, no distress  LUNG: Coarse breath sounds with rhonchi with unlabored respirations  HEART: regular rate and rhythm, S1, S2 normal, no murmur, click, rub or gallop  ABDOMEN: soft, distended, non-tender. Bowel sounds hypoactive. : Prater catheter with cloudy manuel urine. EXTREMITIES: extremities normal with + pedal pulses. Mild generalized edema. SKIN: Normal. Warm to touch. NEUROLOGIC: Obtunded. Bedbound. Unable to follow commands. Lab/Data Review:  No new labs resulted in the last 24 hours.     Assessment:     Principal Problem:    Acute respiratory failure with hypoxia (HCC) (2017)        Plan:     Current Facility-Administered Medications   Medication Dose Route Frequency    fentaNYL (DURAGESIC) 50 mcg/hr patch 1 Patch  1 Patch TransDERmal Q72H    fentaNYL (DURAGESIC) 100 mcg/hr patch 1 Patch  1 Patch TransDERmal Q72H    LORazepam (ATIVAN) tablet 1 mg  1 mg SubLINGual Q4H PRN    LORazepam (ATIVAN) injection 1 mg  1 mg IntraMUSCular Q4H PRN    morphine injection 4 mg  4 mg SubCUTAneous Q30MIN PRN    haloperidol lactate (HALDOL) injection 2 mg  2 mg SubCUTAneous Q1H PRN    diphenhydrAMINE (BENADRYL) injection 25 mg  25 mg IntraMUSCular Q6H PRN    acetaminophen (TYLENOL) suppository 650 mg  650 mg Rectal Q3H PRN    bisacodyl (DULCOLAX) suppository 10 mg  10 mg Rectal PRN    haloperidol lactate (HALDOL) injection 2 mg  2 mg SubCUTAneous Q1H PRN    albuterol-ipratropium (DUO-NEB) 2.5 MG-0.5 MG/3 ML  3 mL Nebulization Q4H PRN    glycopyrrolate (ROBINUL) injection 0.2 mg  0.2 mg SubCUTAneous Q4H PRN     Admitted with acute hypoxic respiratory failure for management of pain, dyspnea and delirium.     1. Pain: Fentanyl as ordered. Increased to 150mcg/hour. Morphine as ordered. 2.. Dyspnea: Morphine as ordered. Glycopyrrolate prn secretions. Duonebs prn.      3. Delirium: Haldol and Ativan as ordered.     4. Family/Pt Support: Family at bedside during exam. Medications and plan of care discussed with nursing staff and family. Will continue to monitor for symptoms and adjust medications as needed to maintain patient comfort. PPS 10%. Case discussed with Dr. Robbie Leahy and in [facility].     Signed By: Lan Camilo NP     February 3, 2017

## 2017-02-03 NOTE — PROGRESS NOTES
Bedside and Verbal shift change report given to Anisha Briones (oncoming nurse) by Kathy Munoz (offgoing nurse). Report included the following information SBAR, Intake/Output and MAR. Pt was digitally disimpacted this shift with X-L hard brown stool. Pt required 5 morphine doses this shift. New fentanyl patch placed on left chest. Daughter and many family members at the bedside now at this time. Pt still speaks periodically and appears to be intermittently experiencing some delirium and anxiety. Moisture barrier applied to back for redness.

## 2017-02-05 NOTE — PROGRESS NOTES
I saw Mrs. Farheen Levy on afternoon rounds. Her daughter and niece were at bedside. The patient is producing less than 600 mL of murky urine per day. Her neck is in radical extension and her jaw is dropped   Strangely, she is still responsive to close range speech opening and flattering her eyes but not speaking. Her respiration is shallow but not rapid. Formally she was hypertensive but is now hypotensive. I discussed my prognosis  Of life expectancy less than 48 hours with the  Patient's family. I do not feel that any further adjustment in medication for comfort is required. The patient is entirely unable to take oral medication and has painful cervical myelopathy and paralysis requiring parenteral opioids.

## 2017-02-05 NOTE — PROGRESS NOTES
Report received. Pt round. Pt identified by name. In bed with eyes open. No s/s of pain, agitation or dyspnea. Bed low and SR up with tab alerts on. Family at bedside.

## 2017-02-06 NOTE — [facility] IDG MASTER NOTE
[facility] Collabor[facility]  Date: 02/06/17  Time: 12:09 PM    ___________________    Patient: Mark Moore  ___________________    Diagnoses:  Diagnoses of Neuralgia and neuritis and Spinal stenosis of lumbar region were pertinent to this visit. Current Medications:    Current Facility-Administered Medications:     morphine injection 8 mg, 8 mg, SubCUTAneous, Q30MIN PRN **OR** [DISCONTINUED] morphine injection 2 mg, 2 mg, IntraVENous, Q20MIN PRN, Courtney Denis NP    fentaNYL (DURAGESIC) 50 mcg/hr patch 1 Patch, 1 Patch, TransDERmal, Q72H, Courtney Denis NP, 1 Patch at 02/06/17 1006    [DISCONTINUED] fentaNYL (DURAGESIC) 25 mcg/hr patch 1 Patch, 1 Patch, TransDERmal, Q72H, 1 Patch at 02/03/17 1000 **AND** fentaNYL (DURAGESIC) 100 mcg/hr patch 1 Patch, 1 Patch, TransDERmal, Q72H, Devendra Patricio NP, 1 Patch at 02/06/17 1005    LORazepam (ATIVAN) tablet 1 mg, 1 mg, SubLINGual, Q4H PRN, Courtney Denis NP, 1 mg at 02/01/17 0402    LORazepam (ATIVAN) injection 1 mg, 1 mg, IntraMUSCular, Q4H PRN, Courtney Denis NP, 1 mg at 02/01/17 0950    haloperidol lactate (HALDOL) injection 2 mg, 2 mg, SubCUTAneous, Q1H PRN, Courtney Denis NP, 2 mg at 01/25/17 1103    diphenhydrAMINE (BENADRYL) injection 25 mg, 25 mg, IntraMUSCular, Q6H PRN, Courtney Denis NP    acetaminophen (TYLENOL) suppository 650 mg, 650 mg, Rectal, Q3H PRN, Courtney Denis NP    bisacodyl (DULCOLAX) suppository 10 mg, 10 mg, Rectal, PRN, Courtney Denis NP, 10 mg at 02/03/17 1055    haloperidol lactate (HALDOL) injection 2 mg, 2 mg, SubCUTAneous, Q1H PRN, Courtney Denis NP, 2 mg at 02/06/17 0557    albuterol-ipratropium (DUO-NEB) 2.5 MG-0.5 MG/3 ML, 3 mL, Nebulization, Q4H PRN, Courtney Denis NP    glycopyrrolate (ROBINUL) injection 0.2 mg, 0.2 mg, SubCUTAneous, Q4H PRN, Courtney Denis NP, 0.2 mg at 01/16/17 0840    Orders: Allergies:   Allergies   Allergen Reactions    Lortab [Hydrocodone-Acetaminophen] Unknown (comments) and Nausea and Vomiting       ___________________    Care Team Notes          POC/IDG Notes      [facility] IDG Nurse Notes by Blair Mascorro at 02/06/17 1322  Version 1 of 1    Author:  Blair Mascorro Service:  Jim Orellana Author Type:  Registered Nurse    Filed:  02/06/17 1327 Date of Service:  02/06/17 1322 Status:  Signed    :  Blair Mascorro (Registered Nurse)           Patient: Hasmukh Wilson    Date: 02/06/17  Time: 1:22 PM    [facility] Nurse Notes    UPDATE: patient continues to require PRN injectables for pain [morphine] and agitation [haldol] over the weekend despite increase of fentanyl patch to 150 mcg; ongoing social/emotional support for family    Goals of care: increase PRN morphine dose to 8 mg every 30 minutes; effectiveness of symptom management continuously evaluated to achieve optimum comfort and ultimately a peaceful death         Signed by: Blair Mascorro       [facility] IDG  Notes by Danial Craig at 02/06/17 1321  Version 1 of 1    Author:  Danial Craig Service:  Spiritual Care Author Type:  Pastoral Care    Filed:  02/06/17 1324 Date of Service:  02/06/17 1321 Status:  Signed    :  Danial Craig (Pastoral Care)           Patient: Hasmukh iWlson    Date: 02/06/17  Time: 1:21 PM    [facility]  Notes     continues to provide care for patient and her family. Vinay Nilesh has recently  focused on grief support for daughter and son in law  given the recent family death. Daughter has been tearful at times but is able to compose herself quickly.            Signed by: Danial Craig       [facility] IDG  Notes by Jules Maza at 02/06/17 1320  Version 1 of 1    Author:  Jules Maza Service:  Jim Orellana Author Type:      Filed:  02/06/17 1322 Date of Service:  02/06/17 1320 Status:  Signed    :  Jules Maza ()           Patient: Hasmukh Wilson    Date: 02/06/17  Time: 1:20 PM    [facility]  Notes    LMSW to continue to provide emotional support for the daughter. Signed by: Joaquin Ward       [facility] IDG  Notes by Isael Dyson at 17 1030  Version 1 of 1    Author:  Isael Dyson Service:  Spiritual Care Author Type:  Pastoral Care    Filed:  17 1308 Date of Service:  17 1030 Status:  Signed    :  Isael Dyson (Pastoral Care)           Patient: Vanessa Olivares    Date: 17  Time: 10:30 AM    [facility]  Notes  Difficult week for the family. Patient's grand son in law  this week. Patient's daughter Chino Kincaid has been more emotional throughout this time. She has gone today to attend the  while volunteers sit with her mom.  spoke to her as she left this morning offering a hug and prayer. She thanked  for being there for her as tears rolled down her cheeks. Patient appears to be declining. Patient's ability to interact is much more limited.          Signed by: Isael Dyson       [facility] IDG Nurse Notes by Erica Pablo at 17 1303  Version 1 of 1    Author:  Erica Pablo Service:  Caitlin Becerril Author Type:  Registered Nurse    Filed:  17 1307 Date of Service:  17 1303 Status:  Signed    :  Erica Pablo (Registered Nurse)           Patient: Vanessa Olivares    Date: 17  Time: 1:03 PM    [facility] Nurse Notes    UPDATE: patient continues on fentanyl patch 125 mcg and requiring frequent PRN morphine injectables for breakthrough pain; continues to receive PRN injectable haldol for agitation and lorazepam for anxiety; ongoing support for family     Goals of care: increase fentanyl patch to 150 mcg for better long-acting pain medication; effectiveness of symptom management continuously evaluated to achieve optimum comfort            Signed by: Erica Pablo       [facility] IDG  Notes by Joaquin Ward at 17 1302  Version 1 of 1    Author:  Joaquin Ward Service:  Moy Healy Type:      Filed:  02/03/17 1306 Date of Service:  02/03/17 1302 Status:  Signed    :  Sindi Russell ()           Patient: Yefri Andrews    Date: 02/03/17  Time: 1:02 PM    [facility]  Notes  LMSW to continue to provide emotional support to pt and Don Samuel. Yajaira's son in [place] [address] was today. We had a volunteer sit with her while Don Samuel is gone. [facility] has been involved. Signed by: Sindi Russell       [facility] IDG Volunteer Notes by Kvng Sun at 02/03/17 1155  Version 1 of 1    Author:  Kvng Sun Service:  Tony Chong Author Type:  Hospice Volunteer/    Filed:  02/03/17 1156 Date of Service:  02/03/17 1155 Status:  Signed    :  Kvng Sun (Hospice Volunteer/)               [facility] Interdisciplinary Plan of Care Review     Status Codes I = Initiated C=Continued R=Revised RS = Resolved     C. Volunteer     Goal: Hospice house volunteer (s) enhances the quality of remaining life while patient is at the hospice house. Interventions: [facility] Peterson francisco Bergeron Sic Volunteer (s) will provide companionship to the patient and/or family by visiting at the hospice house       . [facility] Kristen Bergeron [facility] Volunteer (s) will provide respite as needed when requested by patient and/or family. [facility]  Volunteer will provide activities such as music, reading, pet therapy, etc. as requested. Mliss Peterson Rebecca Hatter  Comfort bag delivered. Any other special requests or information regarding volunteer services:     Multiple visits for prayer, companionship, pet therapy, and volunteer nursing assistance are recorded. No further needs identified at this time. These notes have been discussed in [facility] meeting.         Signed by: Roni GOSS Nurse Notes by Derrick Mcintosh at 01/30/17 1252  Version 1 of 1    Author:  Derrick Mcintosh Service:  Tony Chong Author Type:  Registered Nurse    Filed: 01/30/17 1256 Date of Service:  01/30/17 1252 Status:  Signed    :  Cherise Victoria (Registered Nurse)           Patient: Sameer Parsons    Date: 01/30/17  Time: 12:52 PM    [facility] Nurse Notes    UPDATE: patient remains on fentanyl patch 125 mcg and receiving PRN morphine injectables to manage breakthrough pain; minimal fluid intake; urine output dropped over the weekend and only 200 past 24 hours; newly febrile and an increase in agitation overnight requiring PRN injectable haldol; ongoing emotional support to family    Goals of care: effectiveness of symptom management continuously evaluated to achieve optimum symptom management        Signed by: Cherise Victoria       [facility] IDG  Notes by Mikayla Colon at 01/30/17 1250  Version 1 of 1    Author:  Mikayla Colon Service:  Spiritual Care Author Type:  Pastoral Care    Filed:  01/30/17 1254 Date of Service:  01/30/17 1250 Status:  Signed    :  Mikayla Colon (Pastoral Care)           Patient: Sameer Parsons    Date: 01/30/17  Time: 12:50 PM    [facility]  Notes     continues to be available for patient and family. Since last visit  has spoken to daughter on a couple of occassions outside the room. Family is at peace with patient's impending death. Yajaira's son in law has cancer and is under hospice care. The family appears to bee dealing well with all the stress. Signed by: Mikayla Colon       [facility] IDG  Notes by Eliel Keith at 01/30/17 1247  Version 1 of 1    Author:  Eliel Keith Service:  Rajni Jesus Author Type:      Filed:  01/30/17 1254 Date of Service:  01/30/17 1247 Status:  Signed    :  Eliel Keith ()           Patient: Sameer Parsons    Date: 01/30/17  Time: 12:47 PM    [facility]  Notes  Today,  emotional support was provided to daughter. Pt continues to decline. Family is surprised at how long she has survived without food.   Family states she has not had any food in 2 weeks. Signed by: Reese Colmenares       [facility] IDG  Notes by Reese Colmenares at 01/27/17 1234  Version 1 of 1    Author:  Reese Colmenares Service:  J Luis Hurtado Author Type:      Filed:  01/27/17 1238 Date of Service:  01/27/17 1234 Status:  Signed    :  Reese Colmenares ()           Patient: Ana Tesfaye    Date: 01/27/17  Time: 12:34 PM    \[facility]\""  Notes  LMSW to continue with support to the [place] the daughter. She does not leave her bedside very often. Pt was awake today. Pt is still showing signs of agitation and pain. Signed by: Reese Colmenares       \[facility]\"" IDG  Notes by Angelse Melgar at 01/27/17 1230  Version 1 of 1    Author:  Angeles Melgar Service:  Spiritual Care Author Type:  Pastoral Care    Filed:  01/27/17 1238 Date of Service:  01/27/17 1230 Status:  Signed    :  Angeles Melgar (Pastoral Care)           Patient: Ana Tesfaye    Date: 01/27/17  Time: 12:30 PM    \[facility]\""  Notes    's last encounter with the was in the cafeteria. Daughter and son in law continue to depend on God to give them strength for the journey. They are at peace with mom's impending death. No new spiritual issues. Signed by: Angeles Melgar       [facility] IDG Volunteer Notes by Danay Varghese at 01/27/17 1151  Version 1 of 1    Author:  Danay Varghese Service:  J Luis Hurtado Author Type:  Hospice Volunteer/    Filed:  01/27/17 1152 Date of Service:  01/27/17 1151 Status:  Signed    :  Danay Varghese (Hospice Volunteer/)               [address] [facility] Interdisciplinary Plan of Care Review     Status Codes I = Initiated C=Continued R=Revised RS = Resolved     C Volunteer     Goal: Hospice house volunteer (s) enhances the quality of remaining life while patient is at the hospice house.        Interventions: ..Roberto Duenas Volunteer (s) will provide companionship to the patient and/or family by visiting at the hospice house       . Madina Duenas Volunteer (s) will provide respite as needed when requested by patient and/or family. Madina Chapman  Volunteer will provide activities such as music, reading, pet therapy, etc. as requested. Madina Renner Ari  Comfort bag delivered. Any other special requests or information regarding volunteer services:    13 visits recorded for prayer, pet therapy, companionship. No further needs identified at this time. These notes have been discussed in [facility] meeting. [address] [place] [address]  Notes by Manohar Stanley at 01/23/17 1113  Version 1 of 1    Author:  Manohar Stanley Service:  Spiritual Care Author Type:  Pastoral Care    Filed:  01/23/17 1333 Date of Service:  01/23/17 1113 Status:  Signed    :  Manohar Stanley (Pastoral Care)           Patient: Kevin Garcia    Date: 01/23/17  Time: 11:13 AM    [facility]  Notes    Patient and family care being provided. Patient and family are at peace with patient's impending death.  to offer emotional and spiritual support. Family is involved in a local [employer] and  is supportive. Patient's grandson in law continues to struggle with cancer. Signed by: Manohar Stanley       [address] [place] [address]  Notes by Magda Flower at 01/23/17 1330  Version 1 of 1    Author:  Magda Flower Service:  Claire Malik Author Type:      Filed:  01/23/17 1333 Date of Service:  01/23/17 1330 Status:  Signed    :  Magda Flower ()           Patient: Kevin Garcia    Date: 01/23/17  Time: 1:30 PM    [facility]  Notes  LMSW will continue to visit with the family to provide emotional support.         Signed by: Magda Flower       [facility] IDG Volunteer Notes by Melba Gates at 01/20/17 1443  Version 1 of 1    Author:  Melba Gates Service:  Claire Malik Author Type:  Hospice Volunteer/Volunteer Coordinator    Filed:  01/20/17 1445 Date of Service:  01/20/17 1443 Status:  Signed    :  Duncan Rivera (Hospice Volunteer/)               [address] [facility] Interdisciplinary Plan of Care Review     Status Codes I = Initiated C=Continued R=Revised RS = Resolved     C. Volunteer     Goal: Hospice house volunteer (s) enhances the quality of remaining life while patient is at the hospice house. Interventions: Danielle Carmona Volunteer (s) will provide companionship to the patient and/or family by visiting at the hospice house       . Danielle Carmona Volunteer (s) will provide respite as needed when requested by patient and/or family. Danielle Bee  Volunteer will provide activities such as music, reading, pet therapy, etc. as requested. Danielle Bee  Comfort bag delivered. Any other special requests or information regarding volunteer services:    Seven visits are recorded for prayer, companionship, and volunteer nursing assistance. Comfort bag delivered. Extra visits are requested for companionship. No further needs identified at this time. These notes have been discussed in [facility] meeting. Signed by: Duncan Rivera       [facility] IDG  Notes by Odilon Epstein at 01/20/17 1412  Version 1 of 1    Author:  Odilon Epstein Service:  Steff Vincent Author Type:      Filed:  01/20/17 1414 Date of Service:  01/20/17 1412 Status:  Signed    :  Odilon Epstein ()           Patient: Angelito Gomez    Date: 01/20/17  Time: 2:12 PM    [facility]  Notes  LMSW to continue to provide emotional support for the patient and her daughter. Makayla Billings son in law has a brain tumor and he is on hospice as well.          Signed by: Odilon Epstein       [facility] IDG  Notes by Justine Lim at 01/19/17 1618  Version 1 of 1    Author:  Justine Lim Service:  Spiritual Care Author Type:  Pastoral Care    Filed:  01/20/17 1414 Date of Service:  01/19/17 1618 Status:  Signed    :  Yoli Camacho (Pastoral Care)           Patient: Ira Saldivar    Date: 01/19/17  Time: 4:18 PM    [facility]  Notes     continues to provide spiritual and emotional support for patient and family. During my last visit with the patient she was able to talk, although her voice was much weaker. Patient's daughter came to 's office 1/8/17 and was able to express her feelings. She leans heavily on her clifford as her main source of strength. It is difficult having mom and son in law very sick at the same time. . She has told her daughter to stay at home with her  who is on hospice and take care of him. He has been able to visit patient once since she has been here but most of the time he is confined to home. Eloy Carmen will continue to provide support throughout patient's stay at [facility]. Signed by: Yoli Camacho       [address] [facility]  Notes by Yoli Camacho at 01/16/17 1407  Version 1 of 1    Author:  Yoli Camacho Service:  Spiritual Care Author Type:  Pastoral Care    Filed:  01/16/17 1412 Date of Service:  01/16/17 1407 Status:  Signed    :  Yoli Camacho ([facility])           Patient: Ira Saldivar    Date: 01/16/17  Time: 2:07 PM    [facility]  Notes     continues to be available for patient and family. Patient's daughter continues to be at the bedside faithfully. Her  gives her a break at times. Family feels mom is ready to go   Be with God. Daughter is at peace with mom's impending death. Patient waxes and wanes. During last encounter patient was awake and communicating.        Signed by: Yoli Griffinchester       [address] ID Nurse Notes by Sebastian Arboleda at 01/16/17 1408  Version 1 of 1    Author:  Sebastian Arboleda Service:  Tashia Jacobson Author Type:  Registered Nurse    Filed:  01/16/17 1411 Date of Service:  01/16/17 1408 Status:  Signed    :  Sebastian Arboleda (Registered Nurse) Patient: Nicki Velarde    Date: 01/16/17  Time: 2:08 PM    [address] [place] [address] Nurse Notes    UPDATE: fentanyl patch increased to 37.5 mcg over the weekend related to increased use of PRN medications; despite change, continues to receive a number of PRN injectable morphine doses to manage pain; continues receiving PRN injectable haldol for agitation and nausea - last episode of vomiting this morning; now receiving PRN glyco for secretions; ongoing family support     Goals of care: d/c roxanol; titrate fentanyl patch to 50 mcg to better manage pain with goal to minimize PRN injections; effectiveness of symptom management continuously evaluated to achieve optimum comfort and ultimately a peaceful death      Signed by: aLnre Mckeon       [address] [place] [address]  Notes by Judie Summers at 01/16/17 1407  Version 1 of 1    Author:  Judie Summers Service:  Stevie Heller Author Type:      Filed:  01/16/17 1409 Date of Service:  01/16/17 1407 Status:  Signed    :  Judie Summers ()           Patient: Nicki Velarde    Date: 01/16/17  Time: 2:07 PM    [facility]  Notes  Daughter Alisha Uribe at bedside. She vomited this am.  Family is always at bedside. LMSW continues to visit to provide emotional support, active listening and reassurance. Signed by: Judie Summers       [address] [place] [address]  Notes by Judie Summers at 01/13/17 1229  Version 1 of 1    Author:  Judie Summers Service:  Stevie Heller Author Type:      Filed:  01/13/17 1235 Date of Service:  01/13/17 1229 Status:  Signed    :  Judie Summers ()           Patient: Nicki Velarde    Date: 01/13/17  Time: 12:29 PM    [facility]  Notes  LMSW will continue to provide emotional support. Pt was alert and oriented yesterday. Today she is barely opening her eyes. Family is usually at bedside. No financial concerns for family around final arrangements.   Extra support to the daughter. Signed by: Pushpa Willett       \[facility]\"" IDG Nurse Notes by Emanuel Rivers at 01/13/17 1230  Version 1 of 1    Author:  Emanuel Rivers Service:  Ledon Kocher Author Type:  Registered Nurse    Filed:  01/13/17 1234 Date of Service:  01/13/17 1230 Status:  Signed    :  Emanuel Rivers (Registered Nurse)           Patient: Myron Freeman    Date: 01/13/17  Time: 12:30 PM    [facility] Nurse Notes    UPDATE: fentanyl patch increased to 25 mcg yesterday; since increase in patch, patient has required less but still requiring PRN morphine injectables for pain; continues to only take bites and sips; significantly less responsive overall than earlier this week; BP down into the 60's this morning; continues to receive PRN haldol injectables for agitation    Goals of care: will discontinue oral medications at this time; effectiveness of symptom management continuously evaluated to achieve optimum comfort and ultimately a peaceful death        Signed by: Emanuel Rivers       [facility] IDG  Notes by Leonel King at 01/12/17 1641  Version 1 of 1    Author:  Leonel King Service:  Spiritual Care Author Type:  Pastoral Care    Filed:  01/13/17 1233 Date of Service:  01/12/17 1641 Status:  Signed    :  Leonel King (Pastoral Care)           Patient: Myron Freeman    Date: 01/12/17  Time: 4:41 PM    \[facility]\""  Notes     was quite surprised with my last encounter on 11/12/17. Patient was alert and quite able to communicate. She was oriented to herself, family and surroundings. She is grateful to be awake. She says the pain medicine causes her to sleep but she knows she needs that sleep. Visit was interrupted by Kaylin Gomez NP. Kaylin Gomez wanted  to keep talking while she examined her but  felt it was better to return at a later date.    will continue to be available for support to patient and family during patient's time at [facility]   According to Kaylin Gomez NP patient has changed today and is very sleepy again with little response. Signed by: Rico Murrieta       [facility] IDG Volunteer Notes by David Amezquita at 01/13/17 1157  Version 1 of 1    Author:  David Amezquita Service:  Belita Age Author Type:  Hospice Volunteer/    Filed:  01/13/17 1158 Date of Service:  01/13/17 1157 Status:  Signed    :  David Amezquita (Hospice Volunteer/)               [facility] Interdisciplinary Plan of Care Review     Status Codes I = Initiated C=Continued R=Revised RS = Resolved     C Volunteer     Goal: Hospice house volunteer (s) enhances the quality of remaining life while patient is at the hospice house. Interventions: Candance Italor Candance Italoleticia Ponce Rolodmitriyod Volunteer (s) will provide companionship to the patient and/or family by visiting at the hospice house       . Candance Hugeleticia Kris Rololeod Volunteer (s) will provide respite as needed when requested by patient and/or family. Candance Hugeleticia Cifuentes  Volunteer will provide activities such as music, reading, pet therapy, etc. as requested. Candance Hugeleticia Mejiat Lien  Comfort bag delivered. Any other special requests or information regarding volunteer services:   Six visits recorded for companionship, prayer, visits with family. Daughter enjoys company, per team. Volunteers notified. No further needs identified at this time. These notes have been discussed in [facility] meeting. [facility] IDG  Notes by Rico Murrieta at 01/09/17 4962  Version 1 of 1    Author:  Rico Murrieta Service:  Spiritual Care Author Type:  Pastoral Care    Filed:  01/09/17 1447 Date of Service:  01/09/17 0944 Status:  Signed    :  Rico Murrieta (Pastoral Care)           Patient: John Friedman    Date: 01/09/17  Time: 9:44 AM    [facility]  Notes   provided a safe place for patient's daughter to tell their recent journey. As she began to open up  learned much more than what the patient had gone through.  This  family has had many deaths. Patient is one of 10 siblings. She and only one other are left. Patient's  is . Patient has a daughter who is . In addition to the stress of patient's grand son in law has a elizabeth tumor.  offered support with compassion, empathy, listening, reflection and prayer.  also spoke to bereavement coordinator about the various grief issues this family has faced.  to continued to provide support throughout patient's stay at [facility] Hospital. Signed by: Damon Fang       [facility] IDG Nurse Notes by Walter Moreland at 17 144  Version 1 of 1    Author:  Walter Moreland Service:  Sampson Jesus Author Type:  Registered Nurse    Filed:  17 144 Date of Service:  17 Status:  Signed    :  Walter Moreland (Registered Nurse)           Patient: Fredi Rosa    Date: 17  Time: 2:42 PM    [facility] Nurse Notes    UPDATE: patient not eating although taking sips at times; continues to see people in the room and asking for them to \"open the gate\"; continues to require PRN morphine injectables for breakthrough pain in addition to fentanyl 12mcg patch; receiving 3-4 doses of PRN haldol injectables daily; ongoing support for patient and family    Goals of care: effectiveness of symptom management continuously evaluated to achieve optimum comfort and ultimately a peaceful death        Signed by: Walter Moreland       [facility] IDG  Notes by Paul Massey at 17 144  Version 1 of 1    Author:  Paul Massey Service:  Sampson Jesus Author Type:      Filed:  17 1444 Date of Service:  17 Status:  Signed    :  Paul Massey ()           Patient: Fredi Rosa    Date: 17  Time: 2:41 PM    [facility]  Notes  LMSW continues to offer emotional support. No community resources have been needed for the family or the pt. Pt continues to decline.          Signed by: Lisbeth Raymundo [facility] IDG Volunteer Notes by Levar Berry at 01/06/17 1301  Version 1 of 1    Author:  Levar Berry Service:  Dina Pandey Author Type:  Hospice Volunteer/    Filed:  01/06/17 1301 Date of Service:  01/06/17 1301 Status:  Signed    :  Levar Berry (Hospice Volunteer/)               128 [facility] Interdisciplinary Plan of Care Review     Status Codes I = Initiated C=Continued R=Revised RS = Resolved     I Volunteer     Goal: Hospice house volunteer (s) enhances the quality of remaining life while patient is at the hospice house. Interventions: Jackelin Dillard Volunteer (s) will provide companionship to the patient and/or family by visiting at the hospice house       . Jackelin Dillard Volunteer (s) will provide respite as needed when requested by patient and/or family. Bambi Mani [place]  Volunteer will provide activities such as music, reading, pet therapy, etc. as requested. Bambi Mani [place]  Comfort bag delivered. Any other special requests or information regarding volunteer services: Three visits recorded for prayer and companionship. No further needs identified at this time. These notes have been discussed in [facility] meeting.        [address] [place] [address] IDG Nurse Notes by Marsha Marte at 01/06/17 1234  Version 1 of 1    Author:  Marsha Marte Service:  Dina Pandey Author Type:  Registered Nurse    Filed:  01/06/17 1237 Date of Service:  01/06/17 1234 Status:  Signed    :  Marsha Marte (Registered Nurse)           Patient: Lu Rodriguez    Date: 01/06/17  Time: 12:34 PM    [address] [place] [address] Nurse Notes    UPDATE: increased frequency of morphine required more for pain as opposed to dyspnea since the last IDG; fentanyl patch added and PRN morphine continues to be administered to manage pain and dyspnea; delirium presenting more requiring PRN haldol to manage    Goals of care: effectiveness of symptom management continuously evaluated to achieve optimum comfort      Signed by: Emanuel MONET [facility] IDG  Notes by Cha Arce at 17 1234  Version 1 of 1    Author:  Cha Arce Service:  Ledon Kocher Author Type:  Pastoral Care    Filed:  17 1235 Date of Service:  17 1234 Status:  Signed    :  Cha Arce (Pastoral Care)           Patient: Myron Freeman    Date: 17  Time: 12:34 PM    Patient admitted on 17. Chaplain Kari Johnson and King Overton have both provided spiritual care for patient/family. Se documentation below for Chaplain Frausto's most recent report:     provided a safe place for patient's daughter to tell their recent journey. As she began to open up  learned much more than what the patient had gone through. I learned that the family has had many deaths. Patient is one of 10 siblings. She and only one other are left. Patient's  is . Patient has a daughter who is . In addition to the stress of patient's grand son in law has a elizabeth tumor. Their most resent turn of events has been the patient's decline. Patients daughter has done all that she knew possible to help her mom improve since she fell in October of last year. The patient went to rehab but while there continued the downward spiral. Daughter was in hopes of getting her mom to a spinal doctor. 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 Her organs began to be compromised and she continued to decline until it was determined she needed comfort rather than curative care. Family is at peace with patient being at Hospice. They are assured of her eternal destination.  During this visit  was able to listen and affirm daughters wonderful care, affirm her clifford and offer prayer for patient and family's journey including the grand son in law who has a brain tumor.      Following the visit  updated our Bereavement Coordinator, Mir Robledo. Div of the family's additional stressors. Signed by: Katia Bingham       [facility] IDG  Notes by Leland Wong at 01/06/17 1231  Version 1 of 1    Author:  Leland Wong Service:  Sherie White Author Type:      Filed:  01/06/17 1233 Date of Service:  01/06/17 1231 Status:  Signed    :  Leland Wong ()           Patient: Maria Fernanda Ceballos    Date: 01/06/17  Time: 12:31 PM    [facility]  Notes  Spoke to family about giving the \"LTC\" Bed  Up at [facility]. Family is ok with that. Daughter is feeling a little better with the decline of mom. Signed by: Leland Wong       [facility] IDG  Notes by Leland Wong at 01/03/17 1150  Version 1 of 1    Author:  Leland Wong Service:  Sherie White Author Type:      Filed:  01/03/17 1217 Date of Service:  01/03/17 1150 Status:  Signed    :  Leland Wong ()           Patient: Maria Fernanda Ceballos    Date: 01/03/17  Time: 11:50 AM    [facility]  Notes  LMSW will visit to complete the initial assessment. The family was curious if they needed to cancel the LTC bed at [facility]. LMSW advised the family to not give up a bed for long term just yet. The family will be calling the facility to check on the patient's belonging's.   She was getting rehab before going to the hospital.         Signed by: Leland Wong       [facility] IDG Nurse Notes by Pattei Fields at 01/03/17 1205  Version 1 of 1    Author:  Pattie Fields Service:  Sherie White Author Type:  Registered Nurse    Filed:  01/03/17 1211 Date of Service:  01/03/17 1205 Status:  Signed    :  Pattie Fields (Registered Nurse)           Patient: Maria Fernanda Ceballos    Date: 01/03/17  Time: 12:05 PM    [facility] Nurse Notes    1st IDG since GIP admission to [facility] yesterday; patient minimally responsive with the exception of yes/no questions and observations of talking with beings unable to be seen by staff about how she is \"ready to go to heaven\"; noted aspiration with oral intake despite thickened and crushed medications; patient also noted to be dyspneic with speech; receiving PRN haldol for agitation/delirium and PRN morphine for dyspnea - both alternating between crushed and injectables; SW assessment pending    Goals of care: effectiveness of symptom management continuously evaluated to achieve optimum comfort        Signed by: Jeanne Cano       [address] [facility]  Notes by Karyle Richard at 01/03/17 1038  Version 1 of 1    Author:  Karyle Richard Service:  Spiritual Care Author Type:  Pastoral Care    Filed:  01/03/17 1200 Date of Service:  01/03/17 1038 Status:  Signed    :  Karyle Richard (Pastoral Care)           Patient: Mounika Rey    Date: 01/03/17  Time: 10:38 AM    [facility]  Notes    Spiritual Care assessment completed on January 2, 2017 by Albert Richards. Patient was initially at [facility] and when she became ill was hospitalized with pneumonia and sepsis. She is now GIP level of care. Patient is of [employer]. During 's visit family shared a touching story of how patient led her daughter to [facility] [place] is very important to this family. Family expressed feelings of peace with decisions for patient to be at [facility].  provide support with active listening, compassion and prayer. [employer] to follow up.          Signed by: Karyle Richard       [address] ID Volunteer Notes by Tata Richmond at 01/03/17 1124  Version 1 of 1    Author:  Tata Richmond Service:  Matt Markham Author Type:  Hospice Volunteer/    Filed:  01/03/17 1124 Date of Service:  01/03/17 1124 Status:  Signed    :  Tata Richmond (Hospice Volunteer/)               128 [facility] Interdisciplinary Plan of Care Review     Status Codes I = Initiated C=Continued R=Revised RS = Resolved     I Volunteer     Goal: Hospice house volunteer (s) enhances the quality of remaining life while patient is at the hospice house. Interventions: Salma Skelton Volunteer (s) will provide companionship to the patient and/or family by visiting at the hospice house       . Salma Skelton Volunteer (s) will provide respite as needed when requested by patient and/or family. Salma Maravilla  Volunteer will provide activities such as music, reading, pet therapy, etc. as requested. Salma Maravilla  Comfort bag delivered. Any other special requests or information regarding volunteer services:     No further needs identified at this time. These notes have been discussed in [address] [facility] meeting.

## 2017-02-06 NOTE — [facility] IDG SOCIAL WORKER NOTES
Patient: Dragan Cornejo    Date: 02/06/17  Time: 1:20 PM    [facility]  Notes    LMSW to continue to provide emotional support for the daughter.        Signed by: Dusty Becerra

## 2017-02-06 NOTE — PROGRESS NOTES
Haldol prn agitation and morphine prn pain given. Skin barrier applied to back redness and buttocks, sacrum, coccyx and heels lotioned. Patient repositioned.

## 2017-02-06 NOTE — [facility] IDG NURSE NOTES
Patient: Vanessa Olivares    Date: 02/06/17  Time: 1:22 PM    [facility] Nurse Notes    UPDATE: patient continues to require PRN injectables for pain [morphine] and agitation [haldol] over the weekend despite increase of fentanyl patch to 150 mcg; ongoing social/emotional support for family    Goals of care: increase PRN morphine dose to 8 mg every 30 minutes; effectiveness of symptom management continuously evaluated to achieve optimum comfort and ultimately a peaceful death         Signed by: Erica Pablo

## 2017-02-06 NOTE — [facility] IDG CHAPLAIN NOTES
Patient: Lu Rodriguez    Date: 02/06/17  Time: 1:21 PM    [facility]  Notes     continues to provide care for patient and her family. Petr Jha has recently  focused on grief support for daughter and son in law  given the recent family death. Daughter has been tearful at times but is able to compose herself quickly.            Signed by: Haydee Frost

## 2017-02-06 NOTE — PROGRESS NOTES
Report taken from [facility].. Patient lying in bed resting quietly. No s/sx any distress. Will continue to monitor patient to provide optimum care. Bed lowered and locked, Side rails up x 2. Patient is alone at this moment.

## 2017-02-06 NOTE — PROGRESS NOTES
Report given. Shift summary. Pt is awake at times and speaks to family. Pt has Fentanyl 150 mcg  patch present on left chest. Prater is draining manuel urine. PRN meds for pain given x 2 today. Turned and repositioned for comfort throughout the day. Family at bedside and very attentive. HOB elevated. Bed low and SR up.

## 2017-02-07 NOTE — PROGRESS NOTES
Report given to oncoming nurse. Pt has required no PRN meds this afternoon and is resting comfortable at this time. Family at bedside.

## 2017-02-07 NOTE — PROGRESS NOTES
Progress Note    Patient: Keila Sandoval MRN: [id number]  SSN: [id number]    YOB: 1933  Age: 80 y.o. Sex: female      Admit Date: 1/2/2017    LOS: 36 days     Subjective:     Pt appears comfortable. No distress. Dtr away from bedside at present. Review of Systems:  Review of systems not obtained due to patient factors. Objective:     Vitals:    02/05/17 1813 02/06/17 0601 02/06/17 1924 02/07/17 0623   BP: (!) 84/34 100/43 108/53 (!) 102/36   Pulse: 71 71 76 71   Resp: 12 12 12 24   Temp: (!) 94.8 °F (34.9 °C) 97.1 °F (36.2 °C) 96.5 °F (35.8 °C) 97.6 °F (36.4 °C)   Weight:       Height:            Intake and Output:  Current Shift:    Last three shifts: 02/05 1901 - 02/07 0700  In: -   Out: 700 [Urine:700]    Physical Exam:   GENERAL: no distress, appears stated age, moderately obese  LUNG: rhonchi coarse throughout all lung fields. HEART: regular rate and rhythm, S1, S2 normal, no murmur, click, rub or gallop  ABDOMEN: soft, non-tender. Bowel sounds absent. No masses, no organomegaly  EXTREMITIES: edema to all extremities. + pedal pulses. SKIN: Normal and no rash or abnormalities. Warm to touch. NEUROLOGIC: Obtunded. Bed bound. Generally weakened and debilitated. Lab/Data Review:  No new labs resulted in the last 24 hours.       Assessment:     Principal Problem:    Acute respiratory failure with hypoxia ([facility] [address] (1/2/2017)        Plan:     Current Facility-Administered Medications   Medication Dose Route Frequency    morphine injection 8 mg  8 mg SubCUTAneous Q30MIN PRN    fentaNYL (DURAGESIC) 50 mcg/hr patch 1 Patch  1 Patch TransDERmal Q72H    fentaNYL (DURAGESIC) 100 mcg/hr patch 1 Patch  1 Patch TransDERmal Q72H    LORazepam (ATIVAN) tablet 1 mg  1 mg SubLINGual Q4H PRN    LORazepam (ATIVAN) injection 1 mg  1 mg IntraMUSCular Q4H PRN    haloperidol lactate (HALDOL) injection 2 mg  2 mg SubCUTAneous Q1H PRN    diphenhydrAMINE (BENADRYL) injection 25 mg  25 mg IntraMUSCular Q6H PRN    acetaminophen (TYLENOL) suppository 650 mg  650 mg Rectal Q3H PRN    bisacodyl (DULCOLAX) suppository 10 mg  10 mg Rectal PRN    haloperidol lactate (HALDOL) injection 2 mg  2 mg SubCUTAneous Q1H PRN    albuterol-ipratropium (DUO-NEB) 2.5 MG-0.5 MG/3 ML  3 mL Nebulization Q4H PRN    glycopyrrolate (ROBINUL) injection 0.2 mg  0.2 mg SubCUTAneous Q4H PRN     1. Admitted with acute hypoxic respiratory failure for management of dyspnea, delirium, and family/pt support.      2. Dyspnea: Morphine as ordered. Glycopyrrolate PRN secretions. Duonebs PRN. Continue Fentanyl.      3. Delirium: Haldol and Ativan as ordered PRN.     4. Family/Pt Support: No family at bedside during exam. Ongoing plan of care including medications discussed with primary RN and dtr. Continue to monitor and palliate symptoms as they arise. PPS 10%.      Signed By: Mary Tubbs NP     February 7, 2017

## 2017-02-07 NOTE — PROGRESS NOTES
Pt. With her eyes open but asleep. Daughter of pt stated, Pt. Will sometimes sleep this way. Received report from off going nurse.   flacc 0/10

## 2017-02-07 NOTE — PROGRESS NOTES
Follow up visit:   offered emotional and spiritual support for patient's daughter Matt Wellington. Patient was lying quietly with her eyes open. Matt Wellington shared her recent conversation with her daughter. This is her daughter's first visit after the death of her . Matt Wellington is concerned about her daughter but is grateful she is doing as well as she is.    provided support this visit through active listening and assurance of continued support including visits, and prayer

## 2017-02-08 NOTE — PROGRESS NOTES
Progress Note    Patient: Sameer Parsons MRN: [id number]  SSN: [id number]    YOB: 1933  Age: 80 y.o. Sex: female      Admit Date: 1/2/2017    LOS: 37 days     Subjective:     Pt appears comfortable. Recently medicated by nursing for pain and agitation. No distress at present. No family at bedside as they Templeton Trommald stepped away for lunch. Review of Systems:  Review of systems not obtained due to patient factors. Objective:     Vitals:    02/06/17 0601 02/06/17 1924 02/07/17 0623 02/08/17 0730   BP: 100/43 108/53 (!) 102/36 92/42   Pulse: 71 76 71    Resp: 12 12 24 16   Temp: 97.1 °F (36.2 °C) 96.5 °F (35.8 °C) 97.6 °F (36.4 °C) 96.2 °F (35.7 °C)   Weight:       Height:            Intake and Output:  Current Shift:    Last three shifts: 02/06 1901 - 02/08 0700  In: -   Out: 500 [Urine:500]    Physical Exam:   GENERAL: no distress, appears stated age, moderately obese  LUNG: rhonchi coarse throughout all lung fields. HEART: regular rate and rhythm, S1, S2 normal, no murmur, click, rub or gallop  ABDOMEN: soft, non-tender. Bowel sounds absent. No masses, no organomegaly  EXTREMITIES: Less edema to all extremities. + pedal pulses. SKIN: Warm to touch. NEUROLOGIC: Obtunded. Bed bound. Generally weakened and debilitated. Lab/Data Review:  No new labs resulted in the last 24 hours.       Assessment:     Principal Problem:    Acute respiratory failure with hypoxia ([facility] [address] (1/2/2017)        Plan:     Current Facility-Administered Medications   Medication Dose Route Frequency    morphine injection 8 mg  8 mg SubCUTAneous Q30MIN PRN    fentaNYL (DURAGESIC) 50 mcg/hr patch 1 Patch  1 Patch TransDERmal Q72H    fentaNYL (DURAGESIC) 100 mcg/hr patch 1 Patch  1 Patch TransDERmal Q72H    LORazepam (ATIVAN) tablet 1 mg  1 mg SubLINGual Q4H PRN    LORazepam (ATIVAN) injection 1 mg  1 mg IntraMUSCular Q4H PRN    haloperidol lactate (HALDOL) injection 2 mg  2 mg SubCUTAneous Q1H PRN    diphenhydrAMINE (BENADRYL) injection 25 mg  25 mg IntraMUSCular Q6H PRN    acetaminophen (TYLENOL) suppository 650 mg  650 mg Rectal Q3H PRN    bisacodyl (DULCOLAX) suppository 10 mg  10 mg Rectal PRN    haloperidol lactate (HALDOL) injection 2 mg  2 mg SubCUTAneous Q1H PRN    albuterol-ipratropium (DUO-NEB) 2.5 MG-0.5 MG/3 ML  3 mL Nebulization Q4H PRN    glycopyrrolate (ROBINUL) injection 0.2 mg  0.2 mg SubCUTAneous Q4H PRN     1. Admitted with acute hypoxic respiratory failure for management of dyspnea, delirium, and family/pt support.      2. Dyspnea: Morphine as ordered. Glycopyrrolate PRN secretions. Duonebs PRN. Continue Fentanyl.      3. Delirium: Haldol and Ativan as ordered PRN.     4. Family/Pt Support: No family at bedside during exam. Ongoing plan of care including medications discussed with primary RN. Continue to monitor and palliate symptoms as they arise. No medication changes required at this time. PPS 10%.      Signed By: Marlene Spann NP     February 8, 2017

## 2017-02-08 NOTE — PROGRESS NOTES
Received report from night shift RN. Patient grimacing and moaning indicating pain. Identified by name and date of birth on armband. No s/sx agitation, dyspnea, or N/V. Verified Fentanyl 100mcg and 50mg patches right chest.  Bed low and locked, side rails x2, tab alerts on, call light within reach, and door closed with daughter at bedside.

## 2017-02-08 NOTE — PROGRESS NOTES
Summary Note- Patient is unresponsive to verbal/tactile stimuli. Required SQ PRN medication for pain x1. Mouth care only. Prater to gravity; no bowel movement this shift. Patient safety maintained through hourly rounding: bed low and locked, side rails x2, tab alerts on, call light within reach, and door open for continuous monitoring when family is not at bedside.

## 2017-02-08 NOTE — PROGRESS NOTES
Received report from JEANETTE ALCOCER [facility] and assumed care of patient. No s/sx of distress at this time. Family at bedside. Bed low and locked. Call light within reach. Tab alarm on.

## 2017-02-09 NOTE — PROGRESS NOTES
Summary Note- Patient is lethargic. Required SQ PRN medications for pain. Mouth care only. Prater to gravity; no bowel movement this shift. Patient safety maintained through hourly rounding: bed low and locked, side rails x2, tab alerts on, call light within reach, and door open for continuous monitoring when family is not at bedside.

## 2017-02-09 NOTE — PROGRESS NOTES
DENISESW visited with the pt and daughter. Daughter was very thankful for all that has been done for her. Pt is awake and asking for water and coleslaw. I told her I could do the water but the coleslaw may be a little different. Emotional support provided.

## 2017-02-09 NOTE — PROGRESS NOTES
Progress Note    Patient: Hasmukh Wilson MRN: [id number]  SSN: [id number]    YOB: 1933  Age: 80 y.o. Sex: female      Admit Date: 1/2/2017    LOS: 38 days     Subjective:     Responds to tactile stimuli, nonverbal. NPO. Has required 1 dose of morphine for pain. Family at bedside. Review of Systems:  Review of systems not obtained due to patient factors. Objective:     Vitals:    02/07/17 0623 02/08/17 0730 02/08/17 1913 02/09/17 0758   BP: (!) 102/36 92/42 93/42 105/52   Pulse: 71  (!) 52 73   Resp: 24 16 10 10   Temp: 97.6 °F (36.4 °C) 96.2 °F (35.7 °C) 96.1 °F (35.6 °C) 96.1 °F (35.6 °C)   Weight:       Height:            Intake and Output:  Current Shift: 02/09 0701 - 02/09 1900  In: -   Out: 50 [Urine:50]  Last three shifts:      Physical Exam:   GENERAL: no distress, appears stated age  LUNG: Coarse breath sounds with rhonchi. Shallow, unlabored respirations. HEART: regular rate and rhythm, S1, S2 normal, no murmur, click, rub or gallop  ABDOMEN: soft, non-tender, distended. Bowel sounds hypoactive. : Prater catheter with dark manuel urine. EXTREMITIES:  extremities normal, atraumatic, no cyanosis. Mild generalized edema. SKIN: Warm to touch. Stage I on sacrum. NEUROLOGIC: Responds to stimuli, nonverbal. Bedbound. Unable to follow commands. Lab/Data Review:  No new labs resulted in the last 24 hours.     Assessment:     Principal Problem:    Acute respiratory failure with hypoxia ([facility] [address] (1/2/2017)        Plan:     Current Facility-Administered Medications   Medication Dose Route Frequency    morphine injection 8 mg  8 mg SubCUTAneous Q30MIN PRN    fentaNYL (DURAGESIC) 50 mcg/hr patch 1 Patch  1 Patch TransDERmal Q72H    fentaNYL (DURAGESIC) 100 mcg/hr patch 1 Patch  1 Patch TransDERmal Q72H    LORazepam (ATIVAN) tablet 1 mg  1 mg SubLINGual Q4H PRN    LORazepam (ATIVAN) injection 1 mg  1 mg IntraMUSCular Q4H PRN    haloperidol lactate (HALDOL) injection 2 mg  2 mg SubCUTAneous Q1H PRN    diphenhydrAMINE (BENADRYL) injection 25 mg  25 mg IntraMUSCular Q6H PRN    acetaminophen (TYLENOL) suppository 650 mg  650 mg Rectal Q3H PRN    bisacodyl (DULCOLAX) suppository 10 mg  10 mg Rectal PRN    haloperidol lactate (HALDOL) injection 2 mg  2 mg SubCUTAneous Q1H PRN    albuterol-ipratropium (DUO-NEB) 2.5 MG-0.5 MG/3 ML  3 mL Nebulization Q4H PRN    glycopyrrolate (ROBINUL) injection 0.2 mg  0.2 mg SubCUTAneous Q4H PRN       Admitted with acute hypoxic respiratory failure for management of pain, dyspnea and delirium. 1. Pain: Morphine as ordered. Fentanyl patch as ordered. 2. Dyspnea: Morphine as ordered. Nebulizers prn. Glycopyrrolate prn secretions. Oxygen prn.    3. Delirium: Haloperidol as ordered. Ativan as ordered. 4. Family/Pt Support: Family at bedside during exam. Medications and plan of care discussed with nursing staff and family. Will continue to monitor for symptoms and adjust medications as needed to maintain patient comfort. PPS 10%. Case discussed with Dr. Ozzie Bence.     Signed By: Marilu Silva NP     February 9, 2017

## 2017-02-09 NOTE — PROGRESS NOTES
Pt's pulse is 40 with RR of 8. Daughter at bedside. Discussed likelihood of pt passing soon. Pt not turned or repositioned at this time due to condition.

## 2017-02-09 NOTE — PROGRESS NOTES
Follow up:     opened the door and saw patient sleeping, her daughter was nearby and was talking to company.  briefly let her know she was just checking in and continuing to pray.

## 2017-02-09 NOTE — PROGRESS NOTES
Received report from night shift RN. Patient sleeping. Identified by name and date of birth on armband. Verified Fentanyl patches. No s/sx pain, agitation, dyspnea, or N/V. Bed low and locked, side rails x2, tab alerts on, call light within reach, and door open for continuous monitoring.

## 2017-02-09 NOTE — [facility] IDG CHAPLAIN NOTES
Patient: Ira Saldivar    Date: 17  Time: 4:58 PM    [facility]  Notes     has provided care for patient and family throughout patient's stay with [facility].  has offered emotional support, opportunity for open communication, grief care and prayer. Family is coping very well considering the long stay at [facility] and the recent death in their family. Patient's granddaughter came to visit this week. ( the one whose  }   offered support to her this week as well through active listening, compassion and joining with volunteers in prayer for her.      Signed by: Yoli Camacho

## 2017-02-09 NOTE — PROGRESS NOTES
Pt resting with HOB elevated. Eyes partially opened with no s/s of distress at this time. SR up with tab alerts on. Family at bedside.

## 2017-02-10 NOTE — PROGRESS NOTES
Summary Note- Patient is drowsy, but responsive to pain. Required SQ PRN medications for pain. Mouth care  and sips of water only. Prater to gravity; bowel movement this shift (smear). Wound care proved as ordered.  Patient safety maintained through hourly rounding: bed low and locked, side rails x2, tab alerts on, call light within reach, and door open for continuous monitoring when family is not at bedside

## 2017-02-10 NOTE — PROGRESS NOTES
Progress Note    Patient: Scott Duff MRN: [id number]  SSN: [id number]    YOB: 1933  Age: 80 y.o. Sex: female      Admit Date: 1/2/2017    LOS: 39 days     Subjective:     Obtunded. NPO. Has required 4 doses of morphine for pain. Family at bedside. Review of Systems:  Review of systems not obtained due to patient factors. Objective:     Vitals:    02/08/17 1913 02/09/17 0758 02/09/17 1800 02/10/17 0447   BP: 93/42 105/52 99/42 (!) 96/30   Pulse: (!) 52 73 68 69   Resp: 10 10 12 10   Temp: 96.1 °F (35.6 °C) 96.1 °F (35.6 °C) 96 °F (35.6 °C) 95.9 °F (35.5 °C)   Weight:       Height:            Intake and Output:  Current Shift:    Last three shifts: 02/08 1901 - 02/10 0700  In: 0   Out: 375 [Urine:375]    Physical Exam:   GENERAL: Obtunded, no distress, appears stated age  LUNG: Coarse breath sounds with rhonchi. Shallow, unlabored respirations. HEART: regular rate and rhythm, S1, S2 normal, no murmur, click, rub or gallop  ABDOMEN: soft, non-tender, distended. Bowel sounds hypoactive. : Prater catheter with cloudy, dark manuel urine. EXTREMITIES:  extremities normal, atraumatic, no cyanosis. Mild generalized edema. SKIN: Warm to touch. Stage 2 on coccyx, DTI bilateral buttocks, Two areas Stage II on right shoulder with foam dressing. NEUROLOGIC: Obtunded. Bedbound. Unable to follow commands. Lab/Data Review:  No new labs resulted in the last 24 hours.     Assessment:     Principal Problem:    Acute respiratory failure with hypoxia ([facility] [address] (1/2/2017)        Plan:     Current Facility-Administered Medications   Medication Dose Route Frequency    morphine injection 8 mg  8 mg SubCUTAneous Q30MIN PRN    fentaNYL (DURAGESIC) 50 mcg/hr patch 1 Patch  1 Patch TransDERmal Q72H    fentaNYL (DURAGESIC) 100 mcg/hr patch 1 Patch  1 Patch TransDERmal Q72H    LORazepam (ATIVAN) tablet 1 mg  1 mg SubLINGual Q4H PRN    LORazepam (ATIVAN) injection 1 mg  1 mg IntraMUSCular Q4H PRN    haloperidol lactate (HALDOL) injection 2 mg  2 mg SubCUTAneous Q1H PRN    diphenhydrAMINE (BENADRYL) injection 25 mg  25 mg IntraMUSCular Q6H PRN    acetaminophen (TYLENOL) suppository 650 mg  650 mg Rectal Q3H PRN    bisacodyl (DULCOLAX) suppository 10 mg  10 mg Rectal PRN    haloperidol lactate (HALDOL) injection 2 mg  2 mg SubCUTAneous Q1H PRN    albuterol-ipratropium (DUO-NEB) 2.5 MG-0.5 MG/3 ML  3 mL Nebulization Q4H PRN    glycopyrrolate (ROBINUL) injection 0.2 mg  0.2 mg SubCUTAneous Q4H PRN       Admitted with acute hypoxic respiratory failure for management of pain, dyspnea and delirium. 1. Pain: Morphine as ordered. Fentanyl patch as ordered. 2. Dyspnea: Morphine as ordered. Nebulizers prn. Glycopyrrolate prn secretions. Oxygen prn.    3. Delirium: Haloperidol as ordered. Ativan as ordered. 4. Family/Pt Support: Family at bedside during exam. Medications and plan of care discussed with nursing staff and family. Will continue to monitor for symptoms and adjust medications as needed to maintain patient comfort. PPS 10%. Case discussed with Dr. Rosa Sargent and in [facility] meeting today. Causes of skin breakdown and new wound care orders discussed with daughter Kaci Bedoya.       Signed By: Moon Hernandez NP     February 10, 2017

## 2017-02-10 NOTE — [facility] IDG MASTER NOTE
Hospice Interdisciplinary Group Collaborative  Date: 02/10/17  Time: 11:11 AM    ___________________    Patient: Coretta Peres    ___________________    Diagnoses:  Diagnoses of Neuralgia and neuritis and Spinal stenosis of lumbar region were pertinent to this visit. Current Medications:    Current Facility-Administered Medications:     morphine injection 8 mg, 8 mg, SubCUTAneous, Q30MIN PRN, 8 mg at 02/10/17 0916 **OR** [DISCONTINUED] morphine injection 2 mg, 2 mg, IntraVENous, Q20MIN PRN, Lynette Mons, NP    fentaNYL (DURAGESIC) 50 mcg/hr patch 1 Patch, 1 Patch, TransDERmal, Q72H, Lynette Manzanares, NP, 1 Patch at 02/09/17 0274    [DISCONTINUED] fentaNYL (DURAGESIC) 25 mcg/hr patch 1 Patch, 1 Patch, TransDERmal, Q72H, 1 Patch at 02/03/17 1000 **AND** fentaNYL (DURAGESIC) 100 mcg/hr patch 1 Patch, 1 Patch, TransDERmal, Q72H, Clayton Montnao NP, 1 Patch at 02/09/17 0953    LORazepam (ATIVAN) tablet 1 mg, 1 mg, SubLINGual, Q4H PRN, Lynette Mons, NP, 1 mg at 02/01/17 0402    LORazepam (ATIVAN) injection 1 mg, 1 mg, IntraMUSCular, Q4H PRN, Lynette Mons, NP, 1 mg at 02/01/17 0950    haloperidol lactate (HALDOL) injection 2 mg, 2 mg, SubCUTAneous, Q1H PRN, Lynette Mons, NP, 2 mg at 01/25/17 1103    diphenhydrAMINE (BENADRYL) injection 25 mg, 25 mg, IntraMUSCular, Q6H PRN, Lynette Mons, NP    acetaminophen (TYLENOL) suppository 650 mg, 650 mg, Rectal, Q3H PRN, Lynette Mons, NP    bisacodyl (DULCOLAX) suppository 10 mg, 10 mg, Rectal, PRN, Lynette Mons, NP, 10 mg at 02/03/17 1055    haloperidol lactate (HALDOL) injection 2 mg, 2 mg, SubCUTAneous, Q1H PRN, Lynette Mons, NP, 2 mg at 02/08/17 0658    albuterol-ipratropium (DUO-NEB) 2.5 MG-0.5 MG/3 ML, 3 mL, Nebulization, Q4H PRN, Lynette Manzanares NP    glycopyrrolate (ROBINUL) injection 0.2 mg, 0.2 mg, SubCUTAneous, Q4H PRN, Lynette Manzanares NP, 0.2 mg at 01/16/17 0840    Orders: Allergies:   Allergies   Allergen Reactions    Lortab [Hydrocodone-Acetaminophen] Unknown (comments) and Nausea and Vomiting       ___________________    Care Team Notes          POC/IDG Notes      \[facility]\"" IDG Nurse Notes by Cornel Hardy at 02/10/17 1303  Version 1 of 1    Author:  Cornel Hardy Service:  Steff Vincent Author Type:  Registered Nurse    Filed:  02/10/17 1306 Date of Service:  02/10/17 1303 Status:  Signed    :  Cornel Hardy (Registered Nurse)           Patient: Angelito Gomez    Date: 02/10/17  Time: 1:03 PM    [address] [place] [address] Nurse Notes    UPDATE: patient mostly unresponsive with intermittent periods of light arousal noted; fentanyl patch remains at 150 mcg and PRN morphine injectables used for breakthrough pain; despite ongoing skin breakdown r/t extensive time without oral intake - will hold off on changing to air mattress for patient comfort and ongoing decline; continues to receive PRN injectable haldol for agitation and delirium; ongoing family support    Goals of care: effectiveness of symptom management continuously evaluated to achieve optimum comfort and ultimately a peaceful death        Signed by: Cornel Hardy       [address] [place] [address]  Notes by Odilon Epstein at 02/10/17 1302  Version 1 of 1    Author:  Odilon Epstein Service:  Steff Vincent Author Type:      Filed:  02/10/17 1306 Date of Service:  02/10/17 1302 Status:  Signed    :  Odilon Epstein ()           Patient: Angelito Gomez    Date: 02/10/17  Time: 1:02 PM    \[facility]\""  Notes  LMSW continue to provide emotional support and active listening. Pt always has family in the room with her. They are very supportive.          Signed by: Odilon Epstein       \[facility]\"" IDG  Notes by Justine Lim at 02/09/17 1658  Version 1 of 1    Author:  Justine Lim Service:  Spiritual Care Author Type:  Pastoral Care    Filed:  02/10/17 1305 Date of Service:  02/09/17 1658 Status:  Signed    :  Justine Lim (Pastoral Care) Patient: Mounika Rey    Date: 17  Time: 4:58 PM    [facility]  Notes     has provided care for patient and family [facility]  has offered emotional support, opportunity for open communication, grief care and prayer. Family is coping very well considering the long stay at [facility] and the recent death in their family. Patient's granddaughter came to visit this week. ( the one whose  }   offered support to her this week as well through active listening, compassion and joining with volunteers in prayer for her. Signed by: Karyle Richard [facility] IDG Volunteer Notes by Elizabeth Gaviria at 02/10/17 1235  Version 1 of 1    Author:  Elizabeth Gaviria Service:  Matt Markham Author Type:  Hospice Volunteer/    Filed:  02/10/17 1236 Date of Service:  02/10/17 1235 Status:  Signed    :  Elizabeth Gaviria (Hospice Volunteer/)               [address] [facility] Interdisciplinary Plan of Care Review     Status Codes I = Initiated C=Continued R=Revised RS = Resolved     C. Volunteer     Goal: Hospice house volunteer (s) enhances the quality of remaining life while patient is at the hospice house. Interventions: Mabeline Sink Yamlieline [employer] Volunteer (s) will provide companionship to the patient and/or family by visiting at the hospice house       . Mabeline Sink [employer] Volunteer (s) will provide respite as needed when requested by patient and/or family. Mabeline Sink [employer]  Volunteer will provide activities such as music, reading, pet therapy, etc. as requested. Mabeline Sink Lafonda Bors  Comfort bag delivered. Any other special requests or information regarding volunteer services:    Multiple visits are recorded for pet therapy, companionship, prayer, and volunteer nursing assistance. No further needs identified at this time. These notes have been discussed in 8 [facility] meeting.         Signed by: Elizabeth Gaviria       [address] [place] [address] Nurse Notes by Sebastian Arboleda at 02/06/17 1322  Version 1 of 1    Author:  Sebastian Arboleda Service:  Tashia Jacobson Author Type:  Registered Nurse    Filed:  02/06/17 1327 Date of Service:  02/06/17 1322 Status:  Signed    :  Sebastian Arboleda (Registered Nurse)           Patient: Ira Saldivar    Date: 02/06/17  Time: 1:22 PM    [facility] Nurse Notes    UPDATE: patient continues to require PRN injectables for pain [morphine] and agitation [haldol] over the weekend despite increase of fentanyl patch to 150 mcg; ongoing social/emotional support for family    Goals of care: increase PRN morphine dose to 8 mg every 30 minutes; effectiveness of symptom management continuously evaluated to achieve optimum comfort and ultimately a peaceful death         Signed by: Sebastian Arboleda       [facility] IDG  Notes by Yoli Camacho at 02/06/17 1321  Version 1 of 1    Author:  Yoli Camacho Service:  Spiritual Care Author Type:  Pastoral Care    Filed:  02/06/17 1324 Date of Service:  02/06/17 1321 Status:  Signed    :  Yoli Camacho (Pastoral Care)           Patient: Ira Saldivar    Date: 02/06/17  Time: 1:21 PM    [facility]  Notes     continues to provide care for patient and her family. Eloyconsuelo Carmen has recently  focused on grief support for daughter and son in law  given the recent family death. Daughter has been tearful at times but is able to compose herself quickly. Signed by: Yoli Camacho       [facility] IDG  Notes by Simona Hurtado at 02/06/17 1320  Version 1 of 1    Author:  Simona Hurtado Service:  Tashia Jacobson Author Type:      Filed:  02/06/17 1322 Date of Service:  02/06/17 1320 Status:  Signed    :  Simona Hurtado ()           Patient: Ira Saldivar    Date: 02/06/17  Time: 1:20 PM    [facility]  Notes    LMSW to continue to provide emotional support for the daughter.        Signed by: Simona Hurtado       [facility] IDG  Notes by Damon Fang at 17 1030  Version 1 of 1    Author:  Damon Fang Service:  Spiritual Care Author Type:  Pastoral Care    Filed:  17 1308 Date of Service:  17 1030 Status:  Signed    :  Damon Fang (Pastoral Care)           Patient: Fredi Roas    Date: 17  Time: 10:30 AM    [facility]  Notes  Difficult week for the family. Patient's grand son in law  this week. Patient's daughter Simon Yang has been more emotional throughout this time. She has gone today to attend the  while volunteers sit with her mom.  spoke to her as she left this morning offering a hug and prayer. She thanked  for being there for her as tears rolled down her cheeks. Patient appears to be declining. Patient's ability to interact is much more limited.          Signed by: Damon Fang       [address] [place] [address] Nurse Notes by Walter Moreland at 17 1303  Version 1 of 1    Author:  Walter Moreland Service:  Sampson Jesus Author Type:  Registered Nurse    Filed:  17 1307 Date of Service:  17 1303 Status:  Signed    :  Walter Moreland (Registered Nurse)           Patient: Fredi Rosa    Date: 17  Time: 1:03 PM    [address] [place] [address] Nurse Notes    UPDATE: patient continues on fentanyl patch 125 mcg and requiring frequent PRN morphine injectables for breakthrough pain; continues to receive PRN injectable haldol for agitation and lorazepam for anxiety; ongoing support for family     Goals of care: increase fentanyl patch to 150 mcg for better long-acting pain medication; effectiveness of symptom management continuously evaluated to achieve optimum comfort            Signed by: Walter Moreland       [address] [place] [address]  Notes by Paul Massey at 17 1302  Version 1 of 1    Author:  Paul Massey Service:  Sampson Jesus Author Type:      Filed:  17 1306 Date of Service:  17 1302 Status:  Signed    :  Paul Massey () Patient: Baker Essex    Date: 02/03/17  Time: 1:02 PM    [facility]  Notes  LMSW to continue to provide emotional support to pt and Aubree Alfonso. Yajaira's son in [facility] was today. We had a volunteer sit with her while Aubree Alfonso is gone. [employer] has been involved. Signed by: Kirstie Marks       [facility] IDG Volunteer Notes by Kwabena Arias at 02/03/17 1155  Version 1 of 1    Author:  Kwabena Arias Service:  Feliciano Villa Author Type:  Hospice Volunteer/    Filed:  02/03/17 1156 Date of Service:  02/03/17 1155 Status:  Signed    :  Kwabena Arias (Hospice Volunteer/)               128 [facility] Interdisciplinary Plan of Care Review     Status Codes I = Initiated C=Continued R=Revised RS = Resolved     C. Volunteer     Goal: Hospice house volunteer (s) enhances the quality of remaining life while patient is at the hospice house. Interventions: Pegsamuel Angélicacolt Apple Jensen Rancher Volunteer (s) will provide companionship to the patient and/or family by visiting at the hospice house       . Peggysamuel Gang Jensen Rancher Volunteer (s) will provide respite as needed when requested by patient and/or family. Peggysamuel Lindsay [employer]  Volunteer will provide activities such as music, reading, pet therapy, etc. as requested. Peggyann Gang [employer]  Comfort bag delivered. Any other special requests or information regarding volunteer services:     Multiple visits for prayer, companionship, pet therapy, and volunteer nursing assistance are recorded. No further needs identified at this time. These notes have been discussed in 888 [facility] meeting.         Signed by: Sergey Stoner IDG Nurse Notes by Myles Saha at 01/30/17 1252  Version 1 of 1    Author:  Myles Saha Service:  Feliciano Villa Author Type:  Registered Nurse    Filed:  01/30/17 1256 Date of Service:  01/30/17 1252 Status:  Signed    :  Myles Saha (Registered Nurse)           Patient: Baker Essex    Date: 01/30/17  Time: 12:52 PM    hospitals Nurse Notes    UPDATE: patient remains on fentanyl patch 125 mcg and receiving PRN morphine injectables to manage breakthrough pain; minimal fluid intake; urine output dropped over the weekend and only 200 past 24 hours; newly febrile and an increase in agitation overnight requiring PRN injectable haldol; ongoing emotional support to family    Goals of care: effectiveness of symptom management continuously evaluated to achieve optimum symptom management        Signed by: Derrick Mcintosh       [address] [place] [facility]  Notes by Luis Haskins at 01/30/17 1250  Version 1 of 1    Author:  Luis Haskins Service:  Spiritual Care Author Type:  Pastoral Care    Filed:  01/30/17 1254 Date of Service:  01/30/17 1250 Status:  Signed    :  Luis Haskins (Pastoral Care)           Patient: Yefri Andrews    Date: 01/30/17  Time: 12:50 PM    hospitals  Notes     continues to be available for patient and family. Since last visit  has spoken to daughter on a couple of occassions outside the room. Family is at peace with patient's impending death. Yajaira's son in law has cancer and is under hospice care. The family appears to bee dealing well with all the stress. Signed by: Luis Haskins       [address] [place] [address]  Notes by Sindi Russell at 01/30/17 1246  Version 1 of 1    Author:  Sindi Russell Service:  Tony Chong Author Type:      Filed:  01/30/17 1254 Date of Service:  01/30/17 1248 Status:  Signed    :  Sindi Russell ()           Patient: Yefri Andrews    Date: 01/30/17  Time: 12:47 PM    hospitals  Notes  Today,  emotional support was provided to daughter. Pt continues to decline. Family is surprised at how long she has survived without food. Family states she has not had any food in 2 weeks.           Signed by: Sindi Russell       [address] [facility]  Notes by Sindi Russell at 01/27/17 1234  Version 1 of 1    Author:  Dave Huertas Service:  Suman Vital Author Type:      Filed:  01/27/17 1238 Date of Service:  01/27/17 1234 Status:  Signed    :  Dave Huertas ()           Patient: Gisell Limon    Date: 01/27/17  Time: 12:34 PM    [facility]  Notes  LMSW to continue with support to the Vonda [place] the daughter. She does not leave her bedside very often. Pt was awake today. Pt is still showing signs of agitation and pain. Signed by: Dave Huertas       [facility] IDG  Notes by Hal Mckeon at 01/27/17 1230  Version 1 of 1    Author:  Hal Mckeon Service:  Spiritual Care Author Type:  Pastoral Care    Filed:  01/27/17 1238 Date of Service:  01/27/17 1230 Status:  Signed    :  Hal Mckeon (Pastoral Care)           Patient: Gisell Limon    Date: 01/27/17  Time: 12:30 PM    [facility]  Notes    's last encounter with the was in the cafeteria. Daughter and son in law continue to depend on God to give them strength for the journey. They are at peace with mom's impending death. No new spiritual issues. Signed by: Hal Mckeon       [facility] IDG Volunteer Notes by Chris Mancia at 01/27/17 1151  Version 1 of 1    Author:  Chris Mancia Service:  Suman Vital Author Type:  Hospice Volunteer/    Filed:  01/27/17 1152 Date of Service:  01/27/17 1151 Status:  Signed    :  Chris Mancia (Hospice Volunteer/)               128 [facility] Interdisciplinary Plan of Care Review     Status Codes I = Initiated C=Continued R=Revised RS = Resolved     C Volunteer     Goal: Hospice house volunteer (s) enhances the quality of remaining life while patient is at the hospice house. Interventions: Erum Jimenez Maillard Volunteer (s) will provide companionship to the patient and/or family by visiting at the hospice house       . Erum Jimenez Maillard Volunteer (s) will provide respite as needed when requested by patient and/or family. Bambi Mani Linward [place]  Volunteer will provide activities such as music, reading, pet therapy, etc. as requested. Bambi Mani Linward [place]  Comfort bag delivered. Any other special requests or information regarding volunteer services:    13 visits recorded for prayer, pet therapy, companionship. No further needs identified at this time. These notes have been discussed in 888 [facility] meeting. [address] [facility]  Notes by Haydee Frost at 01/23/17 1113  Version 1 of 1    Author:  Haydee Frost Service:  Spiritual Care Author Type:  Pastoral Care    Filed:  01/23/17 1333 Date of Service:  01/23/17 1113 Status:  Signed    :  Haydee Frost (Pastoral Care)           Patient: Lu Rodriguez    Date: 01/23/17  Time: 11:13 AM    [facility]  Notes    Patient and family care being provided. Patient and family are at peace with patient's impending death.  to offer emotional and spiritual support. Family is involved in a local [employer] and  is supportive. Patient's grandson in law continues to struggle with cancer. Signed by: Haydee Frost       [address] [facility]  Notes by Komal Almaraz at 01/23/17 1330  Version 1 of 1    Author:  Komal Almaraz Service:  Dina Pandey Author Type:      Filed:  01/23/17 1333 Date of Service:  01/23/17 1330 Status:  Signed    :  Komal Almaraz ()           Patient: Lu Rodriguez    Date: 01/23/17  Time: 1:30 PM    [facility]  Notes  LMSW will continue to visit with the family to provide emotional support.         Signed by: Komal Almaraz       [facility] IDG Volunteer Notes by Frank Campuzano at 01/20/17 1443  Version 1 of 1    Author:  Frank Campuzano Service:  Dina Pandey Author Type:  Hospice Volunteer/    Filed:  01/20/17 1447 Date of Service:  01/20/17 1443 Status:  Signed    :  Frank Campuzano [facility] Volunteer/) [address] Review     Status Codes I = Initiated C=Continued R=Revised RS = Resolved     C. Volunteer     Goal: Hospice house volunteer (s) enhances the quality of remaining life while patient is at the hospice house. Interventions: Chris Pryor Volunteer (s) will provide companionship to the patient and/or family by visiting at the hospice house       . Chris Pryor Volunteer (s) will provide respite as needed when requested by patient and/or family. Chris Elizabeth  Volunteer will provide activities such as music, reading, pet therapy, etc. as requested. Chris Coreas Clara  Comfort bag delivered. Any other special requests or information regarding volunteer services:    Seven visits are recorded for prayer, companionship, and volunteer nursing assistance. Comfort bag delivered. Extra visits are requested for companionship. No further needs identified at this time. These notes have been discussed in 888 [facility] meeting. Signed by: Mehran MONET [facility] IDG  Notes by [facility] at 01/20/17 1412  Version 1 of 1    Author:  [facility] Service:  Jim Orellana Author Type:      Filed:  01/20/17 1414 Date of Service:  01/20/17 1412 Status:  Signed    :  Jules Maza ()           Patient: Hasmukh Wilson    Date: 01/20/17  Time: 2:12 PM    [facility]  Notes  SW to continue to provide emotional support for the patient and her daughter. Ismael Whitaker son in law has a brain tumor and he is on hospice as well.          Signed by: [facility] IDG  Notes by Danial Craig at 01/19/17 1618  Version 1 of 1    Author:  Danial Craig Service:  Spiritual Care Author Type:  Pastoral Care    Filed:  01/20/17 1414 Date of Service:  01/19/17 1618 Status:  Signed    :  Danial Craig (Pastoral Care)           Patient: Hasmukh Wilson    Date: 01/19/17  Time: 4:18 PM    [facility]  Notes     continues to provide spiritual and emotional support for patient and family. During my last visit with the patient she was able to talk, although her voice was much weaker. Patient's daughter came to 's office 1/8/17 and was able to express her feelings. She leans heavily on her clifford as her main source of strength. It is difficult having mom and son in law very sick at the same time. . She has told her daughter to stay at home with her  who is on hospice and take care of him. He has been able to visit patient once since she has been here but most of the time he is confined to home. Yoel Philip will continue to provide support throughout patient's stay at [facility]. Signed by: Ava Calvillo       [facility] IDG  Notes by Ava Calvillo at 01/16/17 1407  Version 1 of 1    Author:  Ava Calvillo Service:  Spiritual Care Author Type:  Pastoral Care    Filed:  01/16/17 1412 Date of Service:  01/16/17 1407 Status:  Signed    :  Ava Calvillo (Pastoral Care)           Patient: Radha Timmons    Date: 01/16/17  Time: 2:07 PM    \[facility]\""  Notes     continues to be available for patient and family. Patient's daughter continues to be at the bedside faithfully. Her  gives her a break at times. Family feels mom is ready to go   Be with God. Daughter is at peace with mom's impending death. Patient waxes and wanes. During last encounter patient was awake and communicating.        Signed by: Ava Calvillo       [facility] IDG Nurse Notes by Rayshawn Ashby at 01/16/17 1408  Version 1 of 1    Author:  Rayshawn Ashby Service:  Celio Carl Author Type:  Registered Nurse    Filed:  01/16/17 1411 Date of Service:  01/16/17 1408 Status:  Signed    :  Rayshawn Ashby (Registered Nurse)           Patient: Radha Timmons    Date: 01/16/17  Time: 2:08 PM    \[facility]\"" Nurse Notes    UPDATE: fentanyl patch increased to 37.5 mcg over the weekend related to increased use of PRN medications; despite change, continues to receive a number of PRN injectable morphine doses to manage pain; continues receiving PRN injectable haldol for agitation and nausea - last episode of vomiting this morning; now receiving PRN glyco for secretions; ongoing family support     Goals of care: d/c roxanol; titrate fentanyl patch to 50 mcg to better manage pain with goal to minimize PRN injections; effectiveness of symptom management continuously evaluated to achieve optimum comfort and ultimately a peaceful death      Signed by: Sascha Newberry       [facility] IDG  Notes by Zero Locus at 01/16/17 1407  Version 1 of 1    Author:  Yajaira Allen Service:  Pallavi Mejia Author Type:      Filed:  01/16/17 1409 Date of Service:  01/16/17 1407 Status:  Signed    :  Yajaira Villa ()           Patient: Melissa Edmonds    Date: 01/16/17  Time: 2:07 PM    [facility]  Notes  Daughter Abby Page at bedside. She vomited this am.  Family is always at bedside. LMSW continues to visit to provide emotional support, active listening and reassurance. Signed by: Yajaira Allen       [facility] IDG  Notes by Zero Locus at 01/13/17 1229  Version 1 of 1    Author:  Yajaira Villa Service:  Pallavi Mejia Author Type:      Filed:  01/13/17 1235 Date of Service:  01/13/17 1229 Status:  Signed    :  Yajaira Villa ()           Patient: Melissa Edmonds    Date: 01/13/17  Time: 12:29 PM    [facility]  Notes  LMSW will continue to provide emotional support. Pt was alert and oriented yesterday. Today she is barely opening her eyes. Family is usually at bedside. No financial concerns for family around final arrangements. Extra support to the daughter.         Signed by: Zero Locus       [facility] IDG Nurse Notes by Sascha Newberry at 01/13/17 1230  Version 1 of 1    Author:  Sascha Newberry Service:  HOSPICE Author Type:  Registered Nurse    Filed:  01/13/17 1234 Date of Service:  01/13/17 1230 Status:  Signed    :  Erica Pablo (Registered Nurse)           Patient: Vanessa Olivares    Date: 01/13/17  Time: 12:30 PM    [facility]    UPDATE: fentanyl patch increased to 25 mcg yesterday; since increase in patch, patient has required less but still requiring PRN morphine injectables for pain; continues to only take bites and sips; significantly less responsive overall than earlier this week; BP down into the 60's this morning; continues to receive PRN haldol injectables for agitation    Goals of care: will discontinue oral medications at this time; effectiveness of symptom management continuously evaluated to achieve optimum comfort and ultimately a peaceful death        Signed by: Erica Pablo       [facility]  Notes by Isael Dyson at 01/12/17 1641  Version 1 of 1    Author:  Isael Dyson Service:  Spiritual Care Author Type:  Pastoral Care    Filed:  01/13/17 1233 Date of Service:  01/12/17 1641 Status:  Signed    :  Isael Dyson (Pastoral Care)           Patient: Vanessa Olivares    Date: 01/12/17  Time: 4:41 PM    [facility]  Notes     was quite surprised with my last encounter on 11/12/17. Patient was alert and quite able to communicate. She was oriented to herself, family and surroundings. She is grateful to be awake. She says the pain medicine causes her to sleep but she knows she needs that sleep. Visit was interrupted by Rosanne Beatty NP. Rosanne Beatty wanted  to keep talking while she examined her but  felt it was better to return at a later date.  will continue to be available for support to patient and family during patient's time at [facility]   According to Rosanne Beatty NP patient has changed today and is very sleepy again with little response.          Signed by: Isael Dyson       [address] ID Volunteer Notes by Isaac Rabago at 01/13/17 1157  Version 1 of 1    Author:  Courtney Mcgrath Service:  Mago Lr Author Type:  Hospice Volunteer/    Filed:  17 1158 Date of Service:  17 1157 Status:  Signed    :  Courtney Mcgrath (Hospice Volunteer/)               128 [facility] Interdisciplinary Plan of Care Review     Status Codes I = Initiated C=Continued R=Revised RS = Resolved     C Volunteer     Goal: Hospice house volunteer (s) enhances the quality of remaining life while patient is at the hospice house. Interventions: Katlin Floresny Ros Volunteer (s) will provide companionship to the patient and/or family by visiting at the hospice house       . Katlin Floresadore Sommer Volunteer (s) will provide respite as needed when requested by patient and/or family. Lianne Barrio Leisa Barthel  Volunteer will provide activities such as music, reading, pet therapy, etc. as requested. Lianne Barrio Leisa Barthel  Comfort bag delivered. Any other special requests or information regarding volunteer services:   Six visits recorded for companionship, prayer, visits with family. Daughter enjoys company, per team. Volunteers notified. No further needs identified at this time. These notes have been discussed in 888 [facility] meeting. [address] [place] [address]  Notes by Billy Zhang at 17 6012  Version 1 of 1    Author:  Billy Zhang Service:  Spiritual Care Author Type:  Pastoral Care    Filed:  17 1447 Date of Service:  17 0944 Status:  Signed    :  Billy Zhang (Pastoral Care)           Patient: Heriberto Curtis    Date: 17  Time: 9:44 AM    [facility]  Notes   provided a safe place for patient's daughter to tell their recent journey. As she began to open up  learned much more than what the patient had gone through. This  family has had many deaths. Patient is one of 10 siblings. She and only one other are left. Patient's  is . Patient has a daughter who is .  In addition to the stress of patient's grand son in law has a elizabeth tumor.  offered support with compassion, empathy, listening, reflection and prayer.  also spoke to bereavement coordinator about the various grief issues this family has faced.  to continued to provide support throughout patient's stay at [facility]. Signed by: Santhosh Jain       [facility] IDG Nurse Notes by Kayla Carlson at 01/09/17 1442  Version 1 of 1    Author:  Kayla Carlson Service:  Ignacio Farah Author Type:  Registered Nurse    Filed:  01/09/17 1445 Date of Service:  01/09/17 1442 Status:  Signed    :  Kayla Carlson (Registered Nurse)           Patient: Mark Moore    Date: 01/09/17  Time: 2:42 PM    [facility] Nurse Notes    UPDATE: patient not eating although taking sips at times; continues to see people in the room and asking for them to \"open the gate\"; continues to require PRN morphine injectables for breakthrough pain in addition to fentanyl 12mcg patch; receiving 3-4 doses of PRN haldol injectables daily; ongoing support for patient and family    Goals of care: effectiveness of symptom management continuously evaluated to achieve optimum comfort and ultimately a peaceful death        Signed by: Kayla Carlson       [facility] IDG  Notes by Melisa Guillen at 01/09/17 1441  Version 1 of 1    Author:  Melisa Guillen Service:  Ignacio Farah Author Type:      Filed:  01/09/17 1444 Date of Service:  01/09/17 1441 Status:  Signed    :  Melisa Guillen ()           Patient: Mark Moore    Date: 01/09/17  Time: 2:41 PM    [facility]  Notes  LMSW continues to offer emotional support. No community resources have been needed for the family or the pt. Pt continues to decline.          Signed by: Melisa Guillen       [facility] IDG Volunteer Notes by Yasir Chow at 01/06/17 1301  Version 1 of 1    Author:  Yasir Chow Service:  HOSPICE Author Type:  Hospice Volunteer/    Filed:  01/06/17 1301 Date of Service:  01/06/17 1301 Status:  Signed    :  Luis Daniel Mcgregor (Hospice Volunteer/)               128 [facility] Interdisciplinary Plan of Care Review     Status Codes I = Initiated C=Continued R=Revised RS = Resolved     I Volunteer     Goal: Hospice house volunteer (s) enhances the quality of remaining life while patient is at the hospice house. Interventions: Katherene Means Katherene Means Clerance Sprinkles Volunteer (s) will provide companionship to the patient and/or family by visiting at the hospice house       . Katherene Means Clerance Sprinkles Volunteer (s) will provide respite as needed when requested by patient and/or family. Katherene Means Bharath Fetch  Volunteer will provide activities such as music, reading, pet therapy, etc. as requested. Katherene Means Bharath Fetch  Comfort bag delivered. Any other special requests or information regarding volunteer services: Three visits recorded for prayer and companionship. No further needs identified at this time. These notes have been discussed in 8 [facility] meeting.        [address] [place] [address] Nurse Notes by Rose Vargas at 01/06/17 1234  Version 1 of 1    Author:  Rose Vargas Service:  [facility] Author Type:  Registered Nurse    Filed:  01/06/17 1237 Date of Service:  01/06/17 1234 Status:  Signed    :  Rose Vargas (Registered Nurse)           Patient: Coretta Peres    Date: 01/06/17  Time: 12:34 PM    [address] [facility] Nurse Notes    UPDATE: increased frequency of morphine required more for pain as opposed to dyspnea since the last IDG; fentanyl patch added and PRN morphine continues to be administered to manage pain and dyspnea; delirium presenting more requiring PRN haldol to manage    Goals of care: effectiveness of symptom management continuously evaluated to achieve optimum comfort      Signed by: Rose Vargas       [address] [place] [address]  Notes by Thang Hernandez at 01/06/17 1234  Version 1 of 1    Author:  Thang Hernandez Service:  [facility] Author Type: Pastoral Care    Filed:  17 1235 Date of Service:  17 1234 Status:  Signed    :  Mitchell Gallo (Pastoral Care)           Patient: Kevin Garcia    Date: 17  Time: 12:34 PM    Patient admitted on 17.  Barbara Negro and Aracely Nagy have both provided spiritual care for patient/family. Se documentation below for richmond Frausto's most recent report:     provided a safe place for patient's daughter to tell their recent journey. As she began to open up  learned much more than what the patient had gone through. I learned that the family has had many deaths. Patient is one of 10 siblings. She and only one other are left. Patient's  is . Patient has a daughter who is . In addition to the stress of patient's grand son in law has a elizabeth tumor. Their most resent turn of events has been the patient's decline. Patients daughter has done all that she knew possible to help her mom improve since she fell in October of last year. The patient went to rehab but while there continued the downward spiral. Daughter was in hopes of getting her mom to a spinal doctor. 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 Her organs began to be compromised and she continued to decline until it was determined she needed comfort rather than curative care. Family is at peace with patient being at Hospice. They are assured of her eternal destination. During this visit  was able to listen and affirm daughters wonderful care, affirm her clifford and offer prayer for patient and family's journey including the grand son in law who has a brain tumor.      Following the visit  updated our Bereavement Coordinator, ARIANA Duran Div of the family's additional stressors.        Signed by: Mitchell Gallo       [address] [place] [address]  Notes by Magda Flower at 17 1231  Version 1 of 1    Author:  Magda Flower Service:  Claire Malik Author Type:      Filed:  01/06/17 1233 Date of Service:  01/06/17 1231 Status:  Signed    :  Magda Flower ()           Patient: Kevin Garcia    Date: 01/06/17  Time: 12:31 PM    [facility]  Notes  Spoke to family about giving the \"LTC\" Bed  Up at [facility]. Family is ok with that. Daughter is feeling a little better with the decline of mom. Signed by: Magda Flower       [facility] IDG  Notes by Magda Flower at 01/03/17 1150  Version 1 of 1    Author:  Magda Flower Service:  Claire Malik Author Type:      Filed:  01/03/17 1217 Date of Service:  01/03/17 1150 Status:  Signed    :  Magda Flower ()           Patient: Kevin Garcia    Date: 01/03/17  Time: 11:50 AM    [facility]  Notes  LMSW will visit to complete the initial assessment. The family was curious if they needed to cancel the LTC bed at [facility]. LMSW advised the family to not give up a bed for long term just yet. The family will be calling the facility to check on the patient's belonging's.   She was getting rehab before going to the hospital.         Signed by: Magda Flower       [facility] IDG Nurse Notes by Peggy Mcnamara at 01/03/17 1205  Version 1 of 1    Author:  Peggy Mcnamara Service:  Claire Malik Author Type:  Registered Nurse    Filed:  01/03/17 1211 Date of Service:  01/03/17 1205 Status:  Signed    :  Peggy Mcnamara (Registered Nurse)           Patient: Kevin Garcia    Date: 01/03/17  Time: 12:05 PM    [facility] Nurse Notes    1st IDG since GIP admission to [facility] yesterday; patient minimally responsive with the exception of yes/no questions and observations of talking with beings unable to be seen by staff about how she is \"ready to go to heaven\"; noted aspiration with oral intake despite thickened and crushed medications; patient also noted to be dyspneic with speech; receiving PRN haldol for agitation/delirium and PRN morphine for dyspnea - both alternating between crushed and injectables; SW assessment pending    Goals of care: effectiveness of symptom management continuously evaluated to achieve optimum comfort        Signed by: Derrick MONET [facility] IDG  Notes by Luis Haskins at 01/03/17 1038  Version 1 of 1    Author:  Luis Haskins Service:  Spiritual Care Author Type:  Pastoral Care    Filed:  01/03/17 1200 Date of Service:  01/03/17 1038 Status:  Signed    :  Luis Haskins (Pastoral Care)           Patient: Yefri Andrews    Date: 01/03/17  Time: 10:38 AM    [facility]  Notes    Spiritual Care assessment completed on January 2, 2017 by Whitney Brandon. Patient was initially at [facility] and when she became ill was hospitalized with pneumonia and sepsis. She is now GIP level of care. Patient is of R.RWolfgang Gallagher. During 's visit family shared a touching story of how patient led her daughter to Ashley De La Vega is very important to this family. Family expressed feelings of peace with decisions for patient to be at [facility].  provide support with active listening, compassion and prayer. [employer] to follow up. Signed by: Luis WILKINSONR [facility] IDG Volunteer Notes by Claudia Hercules at 01/03/17 1124  Version 1 of 1    Author:  Claudia Hercules Service:  Tony Chong Author Type:  Hospice Volunteer/    Filed:  01/03/17 1124 Date of Service:  01/03/17 1124 Status:  Signed    :  Claudia Hercules (Hospice Volunteer/)               [address] [facility] [address] Interdisciplinary Plan of Care Review     Status Codes I = Initiated C=Continued R=Revised RS = Resolved     I Volunteer     Goal: Hospice house volunteer (s) enhances the quality of remaining life while patient is at the hospice house.        Interventions: ..Bella Irving Volunteer (s) will provide companionship to the patient and/or family by visiting at the hospice house       . Diamond Irving Volunteer (s) will provide respite as needed when requested by patient and/or family. Diamond Farley  Volunteer will provide activities such as music, reading, pet therapy, etc. as requested. Diamond Farley  Comfort bag delivered. Any other special requests or information regarding volunteer services:     No further needs identified at this time. These notes have been discussed in 888 [facility] meeting.

## 2017-02-10 NOTE — PROGRESS NOTES
Report received from off going RN. Patient round. Patient identified by name and . Patient resting with eyes closed. No s/sx of pain. No distress noted. RR even and unlabored. Bed low and locked. Daughter at the bedside. Call bell within reach.

## 2017-02-10 NOTE — HSPC IDG NURSE NOTES
Patient: Savannah Gil    Date: 02/10/17  Time: 1:03 PM    [facility] Nurse Notes    UPDATE: patient mostly unresponsive with intermittent periods of light arousal noted; fentanyl patch remains at 150 mcg and PRN morphine injectables used for breakthrough pain; despite ongoing skin breakdown r/t extensive time without oral intake - will hold off on changing to air mattress for patient comfort and ongoing decline; continues to receive PRN injectable haldol for agitation and delirium; ongoing family support    Goals of care: effectiveness of symptom management continuously evaluated to achieve optimum comfort and ultimately a peaceful death        Signed by: Marcos Graham

## 2017-02-10 NOTE — PROGRESS NOTES
Received report from night shift RN. Patient sleeping. Identified by name and date of birth on armband. No s/sx pain, agitation, dyspnea, or N/V. Fentanyl patches verified. Bed low and locked, side rails x2, tab alerts on, call light within reach, and door closed with daughter at bedside.

## 2017-02-10 NOTE — HSPC IDG SOCIAL WORKER NOTES
Patient: No Berg    Date: 02/10/17  Time: 1:02 PM    [facility]  Notes  LMSW continue to provide emotional support and active listening. Pt always has family in the room with her. They are very supportive.          Signed by: Shama Mcgrath

## 2017-02-10 NOTE — HSPC IDG VOLUNTEER NOTES
[address] Review     Status Codes I = Initiated C=Continued R=Revised RS = Resolved     C. Volunteer     Goal: Hospice house volunteer (s) enhances the quality of remaining life while patient is at the hospice house. Interventions: Rayshawn Arayudymonse Greenejuli Petersl Mundo [facility] Volunteer (s) will provide companionship to the patient and/or family by visiting at the hospice house       . Rayshawn Petersl Mundo Heredial Volunteer (s) will provide respite as needed when requested by patient and/or family. Rayshawnjuli Bullockerel Sissy Bound  Volunteer will provide activities such as music, reading, pet therapy, etc. as requested. Rayshawnjuli Bullockerel Sissy Bound  Comfort bag delivered. Any other special requests or information regarding volunteer services:    Multiple visits are recorded for pet therapy, companionship, prayer, and volunteer nursing assistance. No further needs identified at this time. These notes have been discussed in 888 [facility] meeting.         Signed by: Michael Marcelo

## 2017-02-11 NOTE — ROUTINE PROCESS
Pt I'd by name and . Pt semi responsive. No distress. No facial grimace. Flacc =0-1. Resp non labored on RA. Lungs diminished. BS diminished. HR reg. No edema noted at this time. Prater cath draining cloudy manuel urine. SR up x2. Bed low/locked. Call light with in reach. Door opened.

## 2017-02-11 NOTE — ROUTINE PROCESS
Pt semi responsive. No distress. No facial grimace. Flacc =0-1. Resp non labored on RA. No scheduled meds due at this time. Fentanyl 150 mcg/hr patches intact on Left chest. SR up x2. Bed low/locked. Call light with in reach. Door opened.

## 2017-02-11 NOTE — ROUTINE PROCESS
Pt semi responsive. No distress. No facial grimace. Flacc =0-1. Resp non labored on RA. SR up x2. Bed low/locked. Call light with in reach. Family at the bedside.

## 2017-02-11 NOTE — ROUTINE PROCESS
Pt with eyes opened. Moans and facial grimace noted. Flacc =5.  Daughter states pt is having pain. Pt unable to rate or state where pain is at this time. Morphine 8mg sq given in left upper arm. Resp non labored on RA. SR up x2. Bed low/locked. Call light with in reach. Family at the bedside.

## 2017-02-11 NOTE — ROUTINE PROCESS
Pt semi responsive. No distress. No facial grimace. Flacc =0-1. Resp non labored on RA. SR up x2. Bed low/locked. Call light with in reach. Door opened.

## 2017-02-12 NOTE — PROGRESS NOTES
ID, bedside report rec 'd. Family report her to be restful at this time. FLACC 0. She does not respond to conversation in the room. Skin cool, very pale, gaunt.

## 2017-02-12 NOTE — PROGRESS NOTES
Medicated for FLACC + grimace, moaning, some restlessness of arms. Family remains diligent in attendance, appreciative of her care and appropriate. Pt RR 12. Assessment, ESAS, fall risk and ana completed. All considerations for level III fall risk initiated/continue. SRs up x 2. Call bell within reach. x__Assess for environmental safety  _x_Ensure frequently used items are in reach of the patient  _x_Place bed in lowest position with wheel locked  __Check footwear for slip resistance and proper fit  _x_Assess effect to patient with medication changes, diuretic and narcotic use  x__Assess need for equipment: bedside commode, urinal, bedpan, etc.  x__Assess and managed pain and symptoms  _x_Assess patients cognition and ability to follow instructions. Grover patient to surroundings.   x__Educate patient and family related to safety techniques  _x_Provide lighting at all times  __Use floor mats while patient in bed  x__Use tab or bed alarm at all times  _x_Position patient for comfort  _x_Assess fall trends  x__Assess for need of physical therapy  _x_Assess need of volunteer or family to sit with patient  x__Evaluate need for change in medications and other treatments  x__Encourage family to provide close supervision while transferring, ambulation, toileting, etc and to request assistance as needed  _x_Assess the need for patient to be close to nurses station  _x_Leave patient door open while unattended by family or staff  _x_Educate patient / family of no restraint policy and interventions to promote safety

## 2017-02-12 NOTE — PROGRESS NOTES
Progress Note    Patient: Malick Triana MRN: [id number]  SSN: [id number]    YOB: 1933  Age: 80 y.o. Sex: female      Admit Date: 1/2/2017    LOS: 41 days     Subjective:     Obtunded. NPO. Has required 3 doses of morphine for pain. Family at bedside. Review of Systems:  Review of systems not obtained due to patient factors. Objective:     Vitals:    02/09/17 1800 02/10/17 0447 02/10/17 1716 02/11/17 1553   BP: 99/42 (!) 96/30 (!) 88/43 (!) 78/34   Pulse: 68 69 72 80   Resp: 12 10 12 14   Temp: 96 °F (35.6 °C) 95.9 °F (35.5 °C) 96.3 °F (35.7 °C) 97.3 °F (36.3 °C)   Weight:       Height:            Intake and Output:  Current Shift:    Last three shifts: 02/10 0701 - 02/11 1900  In: -   Out: 150 [Urine:150]    Physical Exam:   GENERAL: Obtunded, no distress, appears stated age  LUNG: Coarse breath sounds. Shallow, unlabored respirations. HEART: regular rate and rhythm, S1, S2 normal, no murmur, click, rub or gallop  ABDOMEN: soft, non-tender, distended. Bowel sounds hypoactive. : Prater catheter with cloudy, dark manuel urine. EXTREMITIES:  extremities normal, atraumatic, no cyanosis. Mild generalized edema. SKIN: Warm to touch. Stage 2 on coccyx, DTI bilateral buttocks, Two areas Stage II on right shoulder with foam dressing. NEUROLOGIC: Obtunded. Bedbound. Unable to follow commands. Lab/Data Review:  No new labs resulted in the last 24 hours.     Assessment:     Principal Problem:    Acute respiratory failure with hypoxia ([address] (1/2/2017)        Plan:     Current Facility-Administered Medications   Medication Dose Route Frequency    morphine injection 8 mg  8 mg SubCUTAneous Q30MIN PRN    fentaNYL (DURAGESIC) 50 mcg/hr patch 1 Patch  1 Patch TransDERmal Q72H    fentaNYL (DURAGESIC) 100 mcg/hr patch 1 Patch  1 Patch TransDERmal Q72H    LORazepam (ATIVAN) tablet 1 mg  1 mg SubLINGual Q4H PRN    LORazepam (ATIVAN) injection 1 mg  1 mg IntraMUSCular Q4H PRN    haloperidol lactate (HALDOL) injection 2 mg  2 mg SubCUTAneous Q1H PRN    diphenhydrAMINE (BENADRYL) injection 25 mg  25 mg IntraMUSCular Q6H PRN    acetaminophen (TYLENOL) suppository 650 mg  650 mg Rectal Q3H PRN    bisacodyl (DULCOLAX) suppository 10 mg  10 mg Rectal PRN    haloperidol lactate (HALDOL) injection 2 mg  2 mg SubCUTAneous Q1H PRN    albuterol-ipratropium (DUO-NEB) 2.5 MG-0.5 MG/3 ML  3 mL Nebulization Q4H PRN    glycopyrrolate (ROBINUL) injection 0.2 mg  0.2 mg SubCUTAneous Q4H PRN       Admitted with acute hypoxic respiratory failure for management of pain, dyspnea and delirium. 1. Pain: Morphine as ordered. Fentanyl patch as ordered. 2. Dyspnea: Morphine as ordered. Nebulizers prn. Glycopyrrolate prn secretions. Oxygen prn.    3. Delirium: Haloperidol as ordered. Ativan as ordered. 4. Family/Pt Support: Family at bedside during exam. Medications and plan of care discussed with nursing staff and family. Will continue to monitor for symptoms and adjust medications as needed to maintain patient comfort. PPS 10%. Case discussed with Dr. Yash Land.       Signed By: Sam Owens NP     February 11, 2017

## 2017-02-13 NOTE — HSPC IDG CHAPLAIN NOTES
Patient: Sofia Clifton    Date: 02/13/17  Time: 12:52 PM    [facility]  Notes     continues provided routine visits for patient and family. Daughter expressed appreciation for all the love and support.        Signed by: Justino Bermudez

## 2017-02-13 NOTE — PROGRESS NOTES
LMSW visited the pt and family this morning. Pt was awake and able to communicate needs this am. Yajaira's mother in Law was there visiting with pt. Support to pt and family for the journey. Abby Page expressed thankfulness for the ability to have this time with her mom, but she is not happy seeing her \"suffer\" and hang on as long as she has.  Active listening for Abby Page and family

## 2017-02-13 NOTE — ROUTINE PROCESS
Pt with eyes closed. Calm. No facial grimace. Flacc =0-1. No restlessness. Resp shallow, non labored on RA. Family does not wany pt repositioned at this time. SR up x2. Bed low/locked. Call light with in reach. Family at the bedside.

## 2017-02-13 NOTE — [facility] NURSE NOTES
Patient: Yefri Art    Date: 02/13/17  Time: 12:58 PM    [facility] Nurse Notes    UPDATE: patient continues with current POC; continues to receive PRN injectable morphine for pain in addition to 150 mcg fentanyl patch and haldol injectables for agitation; ongoing family support     Goals of care: effectiveness of symptom management continuously evaluated to achieve optimum comfort and ultimately a peaceful death        Signed by: Derrick Mcintosh

## 2017-02-13 NOTE — ROUTINE PROCESS
Pt sleeping. Calm. No distress. No facial grimace. Flacc =0-1. Resp shallow, non labored on RA. SR up x2. Bed low/locked. Family at the bedside.

## 2017-02-13 NOTE — PROGRESS NOTES
Progress Note    Patient: No Berg MRN: [id number]  SSN: [id number]    YOB: 1933  Age: 80 y.o. Sex: female      Admit Date: 1/2/2017    LOS: 42 days     Subjective:     Obtunded. NPO. Has required 4 doses of morphine for pain and Haldol x 1 SQ. Family at bedside. Review of Systems:  Review of systems not obtained due to patient factors. Objective:     Vitals:    02/11/17 1553 02/12/17 0618 02/12/17 1751 02/13/17 0744   BP: (!) 78/34 95/50 120/56 104/51   Pulse: 80 76 75 63   Resp: 14 16 16 17   Temp: 97.3 °F (36.3 °C) 96.4 °F (35.8 °C) 96.6 °F (35.9 °C) 96.1 °F (35.6 °C)   Weight:       Height:            Intake and Output:  Current Shift:    Last three shifts: 02/11 1901 - 02/13 0700  In: -   Out: 275 [Urine:275]    Physical Exam:   GENERAL: Obtunded, no distress, appears stated age  LUNG: Coarse, diminished breath sounds. Shallow, unlabored respirations. HEART: regular rate and rhythm, S1, S2 normal, no murmur, click, rub or gallop  ABDOMEN: soft, non-tender. Bowel sounds hypoactive. : Prater catheter with cloudy, dark manuel urine. EXTREMITIES:  extremities normal, atraumatic, no cyanosis. Mild generalized edema. SKIN: Warm to touch. Stage 2 on coccyx, DTI bilateral buttocks, Two areas Stage II on right shoulder with foam dressing. NEUROLOGIC: Obtunded. Bedbound. Unable to follow commands. Lab/Data Review:  No new labs resulted in the last 24 hours.     Assessment:     Principal Problem:    Acute respiratory failure with hypoxia ([address] (1/2/2017)        Plan:     Current Facility-Administered Medications   Medication Dose Route Frequency    morphine injection 8 mg  8 mg SubCUTAneous Q30MIN PRN    fentaNYL (DURAGESIC) 50 mcg/hr patch 1 Patch  1 Patch TransDERmal Q72H    fentaNYL (DURAGESIC) 100 mcg/hr patch 1 Patch  1 Patch TransDERmal Q72H    LORazepam (ATIVAN) tablet 1 mg  1 mg SubLINGual Q4H PRN    LORazepam (ATIVAN) injection 1 mg  1 mg IntraMUSCular Q4H PRN    haloperidol lactate (HALDOL) injection 2 mg  2 mg SubCUTAneous Q1H PRN    diphenhydrAMINE (BENADRYL) injection 25 mg  25 mg IntraMUSCular Q6H PRN    acetaminophen (TYLENOL) suppository 650 mg  650 mg Rectal Q3H PRN    bisacodyl (DULCOLAX) suppository 10 mg  10 mg Rectal PRN    haloperidol lactate (HALDOL) injection 2 mg  2 mg SubCUTAneous Q1H PRN    albuterol-ipratropium (DUO-NEB) 2.5 MG-0.5 MG/3 ML  3 mL Nebulization Q4H PRN    glycopyrrolate (ROBINUL) injection 0.2 mg  0.2 mg SubCUTAneous Q4H PRN       Admitted with acute hypoxic respiratory failure for management of pain, dyspnea and delirium. 1. Pain: Morphine as ordered. Fentanyl patch as ordered. 2. Dyspnea: Morphine as ordered. Nebulizers prn. Glycopyrrolate prn secretions. Oxygen prn.    3. Delirium: Haloperidol as ordered. Ativan as ordered. 4. Family/Pt Support: Family at bedside during exam. Medications and plan of care discussed with nursing staff and family. Will continue to monitor for symptoms and adjust medications as needed to maintain patient comfort. PPS 10%. Case discussed with Dr. Viv Almanza.       Signed By: Rocael Johnson NP     February 13, 2017

## 2017-02-13 NOTE — ROUTINE PROCESS
Pt with eyes closed. Calm. No facial grimace. Flacc =0-1. No restlessness. Resp shallow, non labored on RA. Family does not wany pt repositioned at this time. Prater cath emptied of 25cc manuel urine. SR up x2. Bed low/locked. Call light with in reach. Family at the bedside.

## 2017-02-13 NOTE — [facility] ID[facility] SOCIAL WORKER NOTES
Patient: Maria Fernanda Ceballos    Date: 02/13/17  Time: 12:47 PM    [facility]  Notes  LMSW visited this morning to provide emotional support to David Robb and the pt. No changes to the plan of care.           Signed by: Leland Wong

## 2017-02-13 NOTE — ROUTINE PROCESS
Pt continues with facial grimace. Moans. Flacc =3. Restless. Morphine 8mg and haldol 2mg sq given in right upper arm. Resp non labored on RA. Fentanyl 150 mcg/hr patch intact right chest. SR up x2. Bed low/locked. Call light with in reach. Family at the bedside.

## 2017-02-13 NOTE — [facility] IDG MASTER NOTE
[facility] Collabor[facility]  Date: 02/15/17  Time: 12:16 PM    ___________________    Patient: Hilda Abrams    ___________________    Diagnoses:  Diagnoses of Neuralgia and neuritis and Spinal stenosis of lumbar region were pertinent to this visit. Current Medications:    Current Facility-Administered Medications:     morphine injection 8 mg, 8 mg, SubCUTAneous, Q30MIN PRN, 8 mg at 02/15/17 1332 **OR** [DISCONTINUED] morphine injection 2 mg, 2 mg, IntraVENous, Q20MIN PRN, Newt Palms, NP    fentaNYL (DURAGESIC) 50 mcg/hr patch 1 Patch, 1 Patch, TransDERmal, Q72H, Newt Palms, NP, 1 Patch at 02/15/17 1032    [DISCONTINUED] fentaNYL (DURAGESIC) 25 mcg/hr patch 1 Patch, 1 Patch, TransDERmal, Q72H, 1 Patch at 02/03/17 1000 **AND** fentaNYL (DURAGESIC) 100 mcg/hr patch 1 Patch, 1 Patch, TransDERmal, Q72H, Claudeen Chesterfield, NP, 1 Patch at 02/15/17 1025    LORazepam (ATIVAN) tablet 1 mg, 1 mg, SubLINGual, Q4H PRN, Newt Palms, NP, 1 mg at 02/01/17 0402    LORazepam (ATIVAN) injection 1 mg, 1 mg, IntraMUSCular, Q4H PRN, Newt Palms, NP, 1 mg at 02/01/17 0950    haloperidol lactate (HALDOL) injection 2 mg, 2 mg, SubCUTAneous, Q1H PRN, Newt Palms, NP, 2 mg at 01/25/17 1103    diphenhydrAMINE (BENADRYL) injection 25 mg, 25 mg, IntraMUSCular, Q6H PRN, Newt Palms, NP    acetaminophen (TYLENOL) suppository 650 mg, 650 mg, Rectal, Q3H PRN, Newt Palms, NP    bisacodyl (DULCOLAX) suppository 10 mg, 10 mg, Rectal, PRN, Newt Palms, NP, 10 mg at 02/03/17 1055    haloperidol lactate (HALDOL) injection 2 mg, 2 mg, SubCUTAneous, Q1H PRN, Newt Palms, NP, 2 mg at 02/13/17 1148    albuterol-ipratropium (DUO-NEB) 2.5 MG-0.5 MG/3 ML, 3 mL, Nebulization, Q4H PRN, Wes Corrales NP    glycopyrrolate (ROBINUL) injection 0.2 mg, 0.2 mg, SubCUTAneous, Q4H PRN, Wes Corrales, LORENZO, 0.2 mg at 01/16/17 0840    Orders: Allergies:   Allergies   Allergen Reactions    Lortab [Hydrocodone-Acetaminophen] Unknown (comments) and Nausea and Vomiting       ___________________    Care Team Notes          POC/IDG Notes      [facility] IDG Nurse Notes by Alan Dunn at 02/13/17 1258  Version 1 of 1    Author:  Alan Dunn Service:  Josh Smallwood Author Type:  Registered Nurse    Filed:  02/13/17 1300 Date of Service:  02/13/17 1258 Status:  Signed    :  Alna Dunn (Registered Nurse)           Patient: Keila Sandoval    Date: 02/13/17  Time: 12:58 PM    [facility] Nurse Notes    UPDATE: patient continues with current POC; continues to receive PRN injectable morphine for pain in addition to 150 mcg fentanyl patch and haldol injectables for agitation; ongoing family support     Goals of care: effectiveness of symptom management continuously evaluated to achieve optimum comfort and ultimately a peaceful death        Signed by: Alan Dunn       [facility]  Notes by Mariela Mitchell at 02/13/17 1252  Version 1 of 1    Author:  Mariela Mitchell Service:  Spiritual Care Author Type:  Pastoral Care    Filed:  02/13/17 1258 Date of Service:  02/13/17 1252 Status:  Signed    :  Mariela Mitchell (Pastoral Care)           Patient: Keila Sandoval    Date: 02/13/17  Time: 12:52 PM    [facility]  Notes     continues provided routine visits for patient and family. Daughter expressed appreciation for all the love and support. Signed by: Mariela Mitchell       [facility] IDG  Notes by Jolynn Mckeon at 02/13/17 1247  Version 1 of 1    Author:  Jolynn Mckeon Service:  Josh Smallwood Author Type:      Filed:  02/13/17 1248 Date of Service:  02/13/17 1247 Status:  Signed    :  Jolynn Mckeon ()           Patient: Keila Sandoval    Date: 02/13/17  Time: 12:47 PM    [facility]  Notes  LMSW visited this morning to provide emotional support to Hume and the pt. No changes to the plan of care.           Signed by: Jolynn Mckeon [address] [place] [address] Nurse Notes by Jon Lake at 02/10/17 1303  Version 1 of 1    Author:  Jon Lake Service:  Sudha Pryor Author Type:  Registered Nurse    Filed:  02/10/17 1306 Date of Service:  02/10/17 1303 Status:  Signed    :  Jon Lake (Registered Nurse)           Patient: Carmen Schneider    Date: 02/10/17  Time: 1:03 PM    [address] [place] [address] Nurse Notes    UPDATE: patient mostly unresponsive with intermittent periods of light arousal noted; fentanyl patch remains at 150 mcg and PRN morphine injectables used for breakthrough pain; despite ongoing skin breakdown r/t extensive time without oral intake - will hold off on changing to air mattress for patient comfort and ongoing decline; continues to receive PRN injectable haldol for agitation and delirium; ongoing family support    Goals of care: effectiveness of symptom management continuously evaluated to achieve optimum comfort and ultimately a peaceful death        Signed by: Jon Lake       [address] [place] [address]  Notes by Nir Honeycutt at 02/10/17 1302  Version 1 of 1    Author:  Nir Honeycutt Service:  Sudha Pryor Author Type:      Filed:  02/10/17 1306 Date of Service:  02/10/17 1302 Status:  Signed    :  Nir Honeycutt ()           Patient: Carmen Schneider    Date: 02/10/17  Time: 1:02 PM    [address] [place] [address]  Notes  LMSW continue to provide emotional support and active listening. Pt always has family in the room with her. They are very supportive.          Signed by: Nir Honeycutt       [facility]  Notes by Wendie Jackman at 02/09/17 1658  Version 1 of 1    Author:  Wendie Jackman Service:  Spiritual Care Author Type:  Pastoral Care    Filed:  02/10/17 1305 Date of Service:  02/09/17 1658 Status:  Signed    :  Wendie Jackman (Pastoral Care)           Patient: Carmen Schneider    Date: 02/09/17  Time: 4:58 PM    [facility]  Notes     has provided care for patient and family throughout patient's stay with [facility].  has offered emotional support, opportunity for open communication, grief care and prayer. Family is coping very well considering the long stay at [facility] and the recent death in their family. Patient's granddaughter came to visit this week. ( the one whose  }   offered support to her this week as well through active listening, compassion and joining with volunteers in prayer for her. Signed by: Rosina MONET [facility] IDG Volunteer Notes by Sharmin Bond at 02/10/17 1235  Version 1 of 1    Author:  Sharmin Bond Service:  Rene Bettencourt Author Type:  Hospice Volunteer/    Filed:  02/10/17 1236 Date of Service:  02/10/17 1235 Status:  Signed    :  Sharmin Him (Hospice Volunteer/)               [facility] Interdisciplinary Plan of Care Review     Status Codes I = Initiated C=Continued R=Revised RS = Resolved     C. Volunteer     Goal: Hospice house volunteer (s) enhances the quality of remaining life while patient is at the hospice house. Interventions: Zack Valdes Lyn Volunteer (s) will provide companionship to the patient and/or family by visiting at the hospice house       . Zack Olesya Valdes Lyn Volunteer (s) will provide respite as needed when requested by patient and/or family. Zack Buckner  Volunteer will provide activities such as music, reading, pet therapy, etc. as requested. Zack Buckner  Comfort bag delivered. Any other special requests or information regarding volunteer services:    Multiple visits are recorded for pet therapy, companionship, prayer, and volunteer nursing assistance. No further needs identified at this time. These notes have been discussed in [facility] meeting.         Signed by: Mariana GOSS Nurse Notes by Kaylah Goldsmith at 17 1322  Version 1 of 1    Author:  Kaylah Goldsmith Service:  Rene Bettencourt Author Type:  Registered Nurse    Filed: 02/06/17 1327 Date of Service:  02/06/17 1322 Status:  Signed    :  Sukhwinder Trejo (Registered Nurse)           Patient: Joseph Wong    Date: 02/06/17  Time: 1:22 PM    [facility] Nurse Notes    UPDATE: patient continues to require PRN injectables for pain [morphine] and agitation [haldol] over the weekend despite increase of fentanyl patch to 150 mcg; ongoing social/emotional support for family    Goals of care: increase PRN morphine dose to 8 mg every 30 minutes; effectiveness of symptom management continuously evaluated to achieve optimum comfort and ultimately a peaceful death         Signed by: Sukhwinder Trejo       [facility] IDG  Notes by Radha Leal at 02/06/17 1321  Version 1 of 1    Author:  Radha Leal Service:  Spiritual Care Author Type:  Pastoral Care    Filed:  02/06/17 1324 Date of Service:  02/06/17 1321 Status:  Signed    :  Radha Leal (Pastoral Care)           Patient: Joseph Wong    Date: 02/06/17  Time: 1:21 PM    [facility]  Notes     continues to provide care for patient and her family. Michelle Canchola has recently  focused on grief support for daughter and son in law  given the recent family death. Daughter has been tearful at times but is able to compose herself quickly. Signed by: Radha Leal       [facility] IDG  Notes by Shira Orozco at 02/06/17 1320  Version 1 of 1    Author:  Shira Orozco Service:  Sandie Briseno Author Type:      Filed:  02/06/17 1322 Date of Service:  02/06/17 1320 Status:  Signed    :  Shira Orozco ()           Patient: Joseph Wong    Date: 02/06/17  Time: 1:20 PM    [facility]  Notes    LMSW to continue to provide emotional support for the daughter.        Signed by: Shira Orozco       [facility] IDG  Notes by Radha Leal at 02/03/17 1030  Version 1 of 1    Author:  Radha Leal Service:  Spiritual Care Author Type:  Pastoral Care    Filed: 17 1308 Date of Service:  17 1030 Status:  Signed    :  Yoli Camacho (Pastoral Care)           Patient: Ira Saldivar    Date: 17  Time: 10:30 AM    [facility]  Notes  Difficult week for the family. Patient's grand son in law  this week. Patient's daughter Aston Bosch has been more emotional throughout this time. She has gone today to attend the  while volunteers sit with her mom.  spoke to her as she left this morning offering a hug and prayer. She thanked  for being there for her as tears rolled down her cheeks. Patient appears to be declining. Patient's ability to interact is much more limited.          Signed by: Yoli Camacho       [facility] IDG Nurse Notes by Sebastian Arboleda at 17 1303  Version 1 of 1    Author:  Sebastian Arboleda Service:  Tashia Jacobson Author Type:  Registered Nurse    Filed:  17 1307 Date of Service:  17 1303 Status:  Signed    :  Sebastian Arboleda (Registered Nurse)           Patient: Ira Saldivar    Date: 17  Time: 1:03 PM    [facility] Nurse Notes    UPDATE: patient continues on fentanyl patch 125 mcg and requiring frequent PRN morphine injectables for breakthrough pain; continues to receive PRN injectable haldol for agitation and lorazepam for anxiety; ongoing support for family     Goals of care: increase fentanyl patch to 150 mcg for better long-acting pain medication; effectiveness of symptom management continuously evaluated to achieve optimum comfort            Signed by: Sebastian Arboleda       [facility] IDG  Notes by Simona Hurtado at 17 1302  Version 1 of 1    Author:  Simona Hurtado Service:  Tashia Jacobson Author Type:      Filed:  17 1306 Date of Service:  17 1302 Status:  Signed    :  Simona Hurtado ()           Patient: Ira Saldivar    Date: 17  Time: 1:02 PM    [facility]  Notes  LMSW to continue to provide emotional support to pt and Hattie Teddy. Yajaira's son in [place] [address] was today. We had a volunteer sit with her while Hattie Espinal is gone. [employer] has been involved. Signed by: Elroy Vora       [facility] IDG Volunteer Notes by Asif Norris at 02/03/17 1155  Version 1 of 1    Author:  Asif Norris Service:  Malathitodd Arias Author Type:  Hospice Volunteer/    Filed:  02/03/17 1156 Date of Service:  02/03/17 1155 Status:  Signed    :  Asif Norris (Hospice Volunteer/)               128 [facility] Interdisciplinary Plan of Care Review     Status Codes I = Initiated C=Continued R=Revised RS = Resolved     C. Volunteer     Goal: Hospice house volunteer (s) enhances the quality of remaining life while patient is at the hospice house. Interventions: Candance Italor Candance Italoleticia Ponce Macleod Volunteer (s) will provide companionship to the patient and/or family by visiting at the hospice house       . Candance Hugeleticia Kris Macleod Volunteer (s) will provide respite as needed when requested by patient and/or family. Candance Hugeleticia Jonhriya Cifuentes  Volunteer will provide activities such as music, reading, pet therapy, etc. as requested. Candance Huger Jonh Lien  Comfort bag delivered. Any other special requests or information regarding volunteer services:     Multiple visits for prayer, companionship, pet therapy, and volunteer nursing assistance are recorded. No further needs identified at this time. These notes have been discussed in [facility] meeting.         Signed by: Asif Norris       [address] [place] [address] Nurse Notes by Annmarie Renee at 01/30/17 1252  Version 1 of 1    Author:  Annmarie Renee Service:  Nba Arias Author Type:  Registered Nurse    Filed:  01/30/17 5802 Date of Service:  01/30/17 1252 Status:  Signed    :  Annmarie Renee (Registered Nurse)           Patient: John Friedman    Date: 01/30/17  Time: 12:52 PM    [facility] Nurse Notes    UPDATE: patient remains on fentanyl patch 125 mcg and receiving PRN morphine injectables to manage breakthrough pain; minimal fluid intake; urine output dropped over the weekend and only 200 past 24 hours; newly febrile and an increase in agitation overnight requiring PRN injectable haldol; ongoing emotional support to family    Goals of care: effectiveness of symptom management continuously evaluated to achieve optimum symptom management        Signed by: Rose Vargas       [facility] IDG  Notes by Vani Gurerero at 01/30/17 1250  Version 1 of 1    Author:  Vani Guerrero Service:  Spiritual Care Author Type:  Pastoral Care    Filed:  01/30/17 1254 Date of Service:  01/30/17 1250 Status:  Signed    :  Vani Guerrero (Pastoral Care)           Patient: Coretta Peres    Date: 01/30/17  Time: 12:50 PM    [facility] [place]  Notes     continues to be available for patient and family. Since last visit  has spoken to daughter on a couple of occassions outside the room. Family is at peace with patient's impending death. Yajaira's son in law has cancer and is under hospice care. The family appears to bee dealing well with all the stress. Signed by: Vani Guerrero       [facility] IDG  Notes by Aarti Hassan at 01/30/17 1247  Version 1 of 1    Author:  Aarti Hassan Service:  Rossy Carl Author Type:      Filed:  01/30/17 1254 Date of Service:  01/30/17 1247 Status:  Signed    :  Aarti Hassan ()           Patient: Coretta Peres    Date: 01/30/17  Time: 12:47 PM    [facility] [place]  Notes  Today,  emotional support was provided to daughter. Pt continues to decline. Family is surprised at how long she has survived without food. Family states she has not had any food in 2 weeks.           Signed by: Aarti Hassan       [facility] IDG  Notes by Aarti Hassan at 01/27/17 1232  Version 1 of 1    Author:  Aarti Hassan Service:  Rossy Carl Author Type:      Filed:  01/27/17 1238 Date of Service:  01/27/17 1234 Status:  Signed    :  Paul Massey ()           Patient: Fredi Rosa    Date: 01/27/17  Time: 12:34 PM    [facility] [place]  Notes  LMSW to continue with support to the Omer Lit the daughter. She does not leave her bedside very often. Pt was awake today. Pt is still showing signs of agitation and pain. Signed by: Paul Massey       [facility] [place] IDG  Notes by Damon Fang at 01/27/17 1230  Version 1 of 1    Author:  Damon Fang Service:  Spiritual Care Author Type:  Pastoral Care    Filed:  01/27/17 1238 Date of Service:  01/27/17 1230 Status:  Signed    :  Damon Fang (Pastoral Care)           Patient: Fredi Rosa    Date: 01/27/17  Time: 12:30 PM    [facility] [place]  Notes    's last encounter with the was in the cafeteria. Daughter and son in law continue to depend on God to give them strength for the journey. They are at peace with mom's impending death. No new spiritual issues. Signed by: Damon Fang       [address] [place] [address] ID Volunteer Notes by Sherwin Lock at 01/27/17 1151  Version 1 of 1    Author:  Sherwin Lock Service:  Sampson Jesus Author Type:  Hospice Volunteer/    Filed:  01/27/17 1152 Date of Service:  01/27/17 1151 Status:  Signed    :  Sherwin Lock (Hospice Volunteer/)               128 [facility] Interdisciplinary Plan of Care Review     Status Codes I = Initiated C=Continued R=Revised RS = Resolved     C Volunteer     Goal: Hospice house volunteer (s) enhances the quality of remaining life while patient is at the hospice house. Interventions: Cornel Florez Volunteer (s) will provide companionship to the patient and/or family by visiting at the hospice house       . Cornel Florez Volunteer (s) will provide respite as needed when requested by patient and/or family. Cornel Darby  Volunteer will provide activities such as music, reading, pet therapy, etc. as requested. Kate White Layman  Comfort bag delivered. Any other special requests or information regarding volunteer services:    13 visits recorded for prayer, pet therapy, companionship. No further needs identified at this time. These notes have been discussed in [facility] meeting. [address] [place] [address]  Notes by Mariela Mitchell at 01/23/17 1113  Version 1 of 1    Author:  Mariela Mitchell Service:  Spiritual Care Author Type:  Pastoral Care    Filed:  01/23/17 1333 Date of Service:  01/23/17 1113 Status:  Signed    :  Mariela Mitchell (Pastoral Care)           Patient: Keila Sandoval    Date: 01/23/17  Time: 11:13 AM    hospitals  Notes    Patient and family care being provided. Patient and family are at peace with patient's impending death.  to offer emotional and spiritual support. Family is involved in a local [employer] and  is supportive. Patient's grandson in law continues to struggle with cancer. Signed by: Mariela Mitchell       [address] [place] [address]  Notes by Jolynn Mckeon at 01/23/17 1330  Version 1 of 1    Author:  Jolynn Mckeon Service:  Josh Smallwood Author Type:      Filed:  01/23/17 1333 Date of Service:  01/23/17 1330 Status:  Signed    :  Jolynn Mckeon ()           Patient: Keila Sandoval    Date: 01/23/17  Time: 1:30 PM    hospitals  Notes  LMSW will continue to visit with the family to provide emotional support.         Signed by: Jolynn Mckeon       [facility] Volunteer Notes by Jennifer Bullock at 01/20/17 1443  Version 1 of 1    Author:  Jennifer Bullock Service:  Josh Smallwood Author Type:  Hospice Volunteer/    Filed:  01/20/17 1441 Date of Service:  01/20/17 1443 Status:  Signed    :  Jennifer Bullock (Hospice Volunteer/)               128 [facility] Interdisciplinary Plan of Care Review     Status Codes I = Initiated C=Continued R=Revised RS = Torey     CWolfgang Volunteer     Goal: Hospice house volunteer (s) enhances the quality of remaining life while patient is at the hospice house. Interventions: Rayshawn Quiroz Volunteer (s) will provide companionship to the patient and/or family by visiting at the hospice house       . Rayshanw Lorrainel Mundo [facility] Volunteer (s) will provide respite as needed when requested by patient and/or family. Rayshawn Pickerel Sissy Bound  Volunteer will provide activities such as music, reading, pet therapy, etc. as requested. Rayshawn Pickerel Sissy Bound  Comfort bag delivered. Any other special requests or information regarding volunteer services:    Seven visits are recorded for prayer, companionship, and volunteer nursing assistance. Comfort bag delivered. Extra visits are requested for companionship. No further needs identified at this time. These notes have been discussed in [facility] meeting. Signed by: Michael MONET [facility] IDG  Notes by Sofia Batista at 01/20/17 1412  Version 1 of 1    Author:  Sofia Batista Service:  Chucky Dewitt Author Type:      Filed:  01/20/17 1414 Date of Service:  01/20/17 1412 Status:  Signed    :  Sofia Batista ()           Patient: Danielle Bustos    Date: 01/20/17  Time: 2:12 PM    hospitals  Notes  LMSW to continue to provide emotional support for the patient and her daughter. Chris Healy son in law has a brain tumor and he is on hospice as well. Signed by: Sofia Batista       hospitals IDG  Notes by Morgan Mario at 01/19/17 1618  Version 1 of 1    Author:  Morgan Mario Service:  Spiritual Care Author Type:  Pastoral Care    Filed:  01/20/17 1414 Date of Service:  01/19/17 1618 Status:  Signed    :  Morgan Mario (Pastoral Care)           Patient: Danielle Bustos    Date: 01/19/17  Time: 4:18 PM    hospitals  Notes     continues to provide spiritual and emotional support for patient and family.   During my last visit with the patient she was able to talk, although her voice was much weaker. Patient's daughter came to 's office 1/8/17 and was able to express her feelings. She leans heavily on her clifford as her main source of strength. It is difficult having mom and son in law very sick at the same time. . She has told her daughter to stay at home with her  who is on hospice and take care of him. He has been able to visit patient once since she has been here but most of the time he is confined to home. Martha Barajas will continue to provide support throughout patient's stay at [facility]. Signed by: Mariaa Mullins       [address] [facility]  Notes by Mariaa Mullins at 01/16/17 1407  Version 1 of 1    Author:  Mariaa Mullins Service:  Spiritual Care Author Type:  Pastoral Care    Filed:  01/16/17 1412 Date of Service:  01/16/17 1407 Status:  Signed    :  Mariaa Mullins (Pastoral Care)           Patient: Malick Triana    Date: 01/16/17  Time: 2:07 PM    hospitals  Notes     continues to be available for patient and family. Patient's daughter continues to be at the bedside faithfully. Her  gives her a break at times. Family feels mom is ready to go   Be with God. Daughter is at peace with mom's impending death. Patient waxes and wanes. During last encounter patient was awake and communicating.        Signed by: Mariaa Mullins       [address] [facility] Nurse Notes by Sujey Benavidez at 01/16/17 1408  Version 1 of 1    Author:  Sujey Benavidez Service:  Caro Woods Author Type:  Registered Nurse    Filed:  01/16/17 1411 Date of Service:  01/16/17 1408 Status:  Signed    :  Sujey Benavidez (Registered Nurse)           Patient: Malick Triana    Date: 01/16/17  Time: 2:08 PM    [address] Nurse Notes    UPDATE: fentanyl patch increased to 37.5 mcg over the weekend related to increased use of PRN medications; despite change, continues to receive a number of PRN injectable morphine doses to manage pain; continues receiving PRN injectable haldol for agitation and nausea - last episode of vomiting this morning; now receiving PRN glyco for secretions; ongoing family support     Goals of care: d/c roxanol; titrate fentanyl patch to 50 mcg to better manage pain with goal to minimize PRN injections; effectiveness of symptom management continuously evaluated to achieve optimum comfort and ultimately a peaceful death      Signed by: Erica Pablo       [facility] IDG  Notes by Joaquin Ward at 01/16/17 1407  Version 1 of 1    Author:  Joaquin Ward Service:  Caitlin Becerril Author Type:      Filed:  01/16/17 1409 Date of Service:  01/16/17 1407 Status:  Signed    :  Joaquin Ward ()           Patient: Vanessa Olivares    Date: 01/16/17  Time: 2:07 PM    [facility]  Notes  Daughter Chino Kincaid at bedside. She vomited this am.  Family is always at bedside. LMSW continues to visit to provide emotional support, active listening and reassurance. Signed by: Joaquin Ward       [facility] IDG  Notes by Joaquin Ward at 01/13/17 1229  Version 1 of 1    Author:  Joaquin Ward Service:  Caitlin Becerril Author Type:      Filed:  01/13/17 1235 Date of Service:  01/13/17 1229 Status:  Signed    :  Joaquin Ward ()           Patient: Vanessa Olivares    Date: 01/13/17  Time: 12:29 PM    [facility]  Notes  LMSW will continue to provide emotional support. Pt was alert and oriented yesterday. Today she is barely opening her eyes. Family is usually at bedside. No financial concerns for family around final arrangements. Extra support to the daughter.         Signed by: Joaquin Ward       [facility] IDG Nurse Notes by Erica Pablo at 01/13/17 1230  Version 1 of 1    Author:  Erica Pablo Service:  Caitlin Becerril Author Type:  Registered Nurse    Filed:  01/13/17 1234 Date of Service:  01/13/17 1230 Status:  Signed    : Jeanne Cano (Registered Nurse)           Patient: Mounika Rey    Date: 01/13/17  Time: 12:30 PM    [address] Nurse Notes    UPDATE: fentanyl patch increased to 25 mcg yesterday; since increase in patch, patient has required less but still requiring PRN morphine injectables for pain; continues to only take bites and sips; significantly less responsive overall than earlier this week; BP down into the 60's this morning; continues to receive PRN haldol injectables for agitation    Goals of care: will discontinue oral medications at this time; effectiveness of symptom management continuously evaluated to achieve optimum comfort and ultimately a peaceful death        Signed by: Jeanne Cano       [address] ID  Notes by Karyle Richard at 01/12/17 1641  Version 1 of 1    Author:  Karyle Richard Service:  Spiritual Care Author Type:  Pastoral Care    Filed:  01/13/17 1233 Date of Service:  01/12/17 1641 Status:  Signed    :  Karyle Richard (Pastoral Care)           Patient: Mounika Rey    Date: 01/12/17  Time: 4:41 PM    [facility] [place]  Notes     was quite surprised with my last encounter on 11/12/17. Patient was alert and quite able to communicate. She was oriented to herself, family and surroundings. She is grateful to be awake. She says the pain medicine causes her to sleep but she knows she needs that sleep. Visit was interrupted by Enrico Huynh NP. Enrico Huynh wanted  to keep talking while she examined her but  felt it was better to return at a later date.  will continue to be available for support to patient and family during patient's time at [facility]   According to Enrico Huynh NP patient has changed today and is very sleepy again with little response.          Signed by: Karyle Richard       [address] IDG Volunteer Notes by Tata Richmond at 01/13/17 1577  Version 1 of 1    Author:  Tata Richmond Service:  Matt Markham Author Type:  Hospice Volunteer/    Filed: 17 1158 Date of Service:  17 1157 Status:  Signed    :  Jennifer Cody (Hospice Volunteer/)               [address] [facility] Interdisciplinary Plan of Care Review     Status Codes I = Initiated C=Continued R=Revised RS = Resolved     C Volunteer     Goal: Hospice house volunteer (s) enhances the quality of remaining life while patient is at the hospice house. Interventions: Qiana Taylor Volunteer (s) will provide companionship to the patient and/or family by visiting at the hospice house       . Qiana Benedict [place] Volunteer (s) will provide respite as needed when requested by patient and/or family. Qiana Yang [place]  Volunteer will provide activities such as music, reading, pet therapy, etc. as requested. Qiana Morel Trey Taurus  Comfort bag delivered. Any other special requests or information regarding volunteer services:   Six visits recorded for companionship, prayer, visits with family. Daughter enjoys company, per team. Volunteers notified. No further needs identified at this time. These notes have been discussed in [facility] meeting. [address] [place] [address]  Notes by Lars Brewer at 17 6061  Version 1 of 1    Author:  Lars Brewer Service:  Spiritual Care Author Type:  Pastoral Care    Filed:  17 1447 Date of Service:  17 0944 Status:  Signed    :  Lars Brewer (Pastoral Care)           Patient: No Berg    Date: 17  Time: 9:44 AM    [facility] [place]  Notes   provided a safe place for patient's daughter to tell their recent journey. As she began to open up  learned much more than what the patient had gone through. This  family has had many deaths. Patient is one of 10 siblings. She and only one other are left. Patient's  is . Patient has a daughter who is . In addition to the stress of patient's grand son in law has a elizabeth tumor.    offered support with compassion, empathy, listening, reflection and parveen.  also spoke to bereavement coordinator about the various grief issues this family has faced.  to continued to provide support throughout patient's stay at [facility]. Signed by: Zakia Garcia       [facility] IDG Nurse Notes by Sascha Newberry at 01/09/17 1442  Version 1 of 1    Author:  Sascha Newberry Service:  Pallavi Mejia Author Type:  Registered Nurse    Filed:  01/09/17 1445 Date of Service:  01/09/17 1442 Status:  Signed    :  Sascha Newberry (Registered Nurse)           Patient: Melissa Edmonds    Date: 01/09/17  Time: 2:42 PM    [facility] Nurse Notes    UPDATE: patient not eating although taking sips at times; continues to see people in the room and asking for them to \"open the gate\"; continues to require PRN morphine injectables for breakthrough pain in addition to fentanyl 12mcg patch; receiving 3-4 doses of PRN haldol injectables daily; ongoing support for patient and family    Goals of care: effectiveness of symptom management continuously evaluated to achieve optimum comfort and ultimately a peaceful death        Signed by: Sascha Newberry       [facility] IDG  Notes by Yajaira Allen at 01/09/17 1441  Version 1 of 1    Author:  Yajaira Villa Service:  Pallavi Mejia Author Type:      Filed:  01/09/17 1444 Date of Service:  01/09/17 1441 Status:  Signed    :  Yajaira Villa ()           Patient: Melissa Edmonds    Date: 01/09/17  Time: 2:41 PM    [facility]  Notes  LMSW continues to offer emotional support. No community resources have been needed for the family or the pt. Pt continues to decline.          Signed by: Yajaira Allen       [facility] IDG Volunteer Notes by Franki Gottlieb at 01/06/17 1301  Version 1 of 1    Author:  Franki Gottlieb Service:  Pallavi Mejia Author Type:  Hospice Volunteer/    Filed:  01/06/17 1301 Date of Service:  01/06/17 1301 Status:  Signed    :  Franki Gottlieb (Hospice Volunteer/)               [place] [address] of Care Review     Status Codes I = Initiated C=Continued R=Revised RS = Resolved     I Volunteer     Goal: Hospice house volunteer (s) enhances the quality of remaining life while patient is at the hospice house. Interventions: Reanna Oreilly Volunteer (s) will provide companionship to the patient and/or family by visiting at the hospice house       . Reanna Oreilly Volunteer (s) will provide respite as needed when requested by patient and/or family. Reanna Abernathy [place]  Volunteer will provide activities such as music, reading, pet therapy, etc. as requested. Reanna Robleschelo  Comfort bag delivered. Any other special requests or information regarding volunteer services: Three visits recorded for prayer and companionship. No further needs identified at this time. These notes have been discussed in [facility] meeting.        [facility] IDG Nurse Notes by Gaetano Toribio at 01/06/17 1234  Version 1 of 1    Author:  Gaetano Toribio Service:  Jon Hurtado Author Type:  Registered Nurse    Filed:  01/06/17 1237 Date of Service:  01/06/17 1234 Status:  Signed    :  Gaetano Toribio (Registered Nurse)           Patient: Hilda Abrams    Date: 01/06/17  Time: 12:34 PM    [facility] Nurse Notes    UPDATE: increased frequency of morphine required more for pain as opposed to dyspnea since the last IDG; fentanyl patch added and PRN morphine continues to be administered to manage pain and dyspnea; delirium presenting more requiring PRN haldol to manage    Goals of care: effectiveness of symptom management continuously evaluated to achieve optimum comfort      Signed by: Gaetano Toribio       [facility] IDG  Notes by Asia Lima at 01/06/17 1234  Version 1 of 1    Author:  Asia Lima Service:  Jon Hurtado Author Type:  Pastoral Care    Filed:  01/06/17 1235 Date of Service:  01/06/17 1234 Status:  Signed    :  Asia Lima (Pastoral Care) Patient: Hasmukh Wilson    Date: 17  Time: 12:34 PM    Patient admitted on 17. gloria Richards and Becca Urbina have both provided spiritual care for patient/family. Se documentation below for Chaplain Frausto's most recent report:     provided a safe place for patient's daughter to tell their recent journey. As she began to open up  learned much more than what the patient had gone through. I learned that the family has had many deaths. Patient is one of 10 siblings. She and only one other are left. Patient's  is . Patient has a daughter who is . In addition to the stress of patient's grand son in law has a elizabeth tumor. Their most resent turn of events has been the patient's decline. Patients daughter has done all that she knew possible to help her mom improve since she fell in October of last year. The patient went to rehab but while there continued the downward spiral. Daughter was in hopes of getting her mom to a spinal doctor. 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 Her organs began to be compromised and she continued to decline until it was determined she needed comfort rather than curative care. Family is at peace with patient being at Hospice. They are assured of her eternal destination. During this visit  was able to listen and affirm daughters wonderful care, affirm her clifford and offer prayer for patient and family's journey including the grand son in law who has a brain tumor.      Following the visit  updated our Bereavement Coordinator, ARIANA Wisdom Div of the family's additional stressors.        Signed by: Chai MONET [facility] ANA PAULA  Notes by Vicente Link at 17 1231  Version 1 of 1    Author:  Jules Link Service:  Jim Orellana Author Type:      Filed:  17 1233 Date of Service:  01/06/17 1231 Status:  Signed    :  Ekaterina Boucher ()           Patient: Savannah Gil    Date: 01/06/17  Time: 12:31 PM    [facility]  Notes  Spoke to family about giving the \"LTC\" Bed  Up at [facility]. Family is ok with that. Daughter is feeling a little better with the decline of mom. Signed by: Ekaterina Boucher       [facility] IDG  Notes by Ekaterina Boucher at 01/03/17 1150  Version 1 of 1    Author:  Ekaterina Boucher Service:  Baldo Moran Author Type:      Filed:  01/03/17 1217 Date of Service:  01/03/17 1150 Status:  Signed    :  Ekaterina Boucher ()           Patient: Savannah Gil    Date: 01/03/17  Time: 11:50 AM    [facility]  Notes  LMSW will visit to complete the initial assessment. The family was curious if they needed to cancel the LTC bed at [facility]. LMSW advised the family to not give up a bed for long term just yet. The family will be calling the facility to check on the patient's belonging's.   She was getting rehab before going to the hospital.         Signed by: Ekaterina Boucher       [facility] IDG Nurse Notes by Marcos Graham at 01/03/17 1205  Version 1 of 1    Author:  Marcos Graham Service:  Baldo Moran Author Type:  Registered Nurse    Filed:  01/03/17 1211 Date of Service:  01/03/17 1205 Status:  Signed    :  Marcos Graham (Registered Nurse)           Patient: Savannah Gil    Date: 01/03/17  Time: 12:05 PM    [facility] Nurse Notes    1st IDG since GIP admission to [facility] yesterday; patient minimally responsive with the exception of yes/no questions and observations of talking with beings unable to be seen by staff about how she is \"ready to go to heaven\"; noted aspiration with oral intake despite thickened and crushed medications; patient also noted to be dyspneic with speech; receiving PRN haldol for agitation/delirium and PRN morphine for dyspnea - both alternating between crushed and injectables; SW assessment pending    Goals of care: effectiveness of symptom management continuously evaluated to achieve optimum comfort        Signed by: Jaylene Jhaveri       [facility] IDG  Notes by Damon Briones at 01/03/17 1038  Version 1 of 1    Author:  Damon Briones Service:  Spiritual Care Author Type:  Pastoral Care    Filed:  01/03/17 1200 Date of Service:  01/03/17 1038 Status:  Signed    :  Damon Briones (Pastoral Care)           Patient: Randall Evans    Date: 01/03/17  Time: 10:38 AM    [facility]  Notes    Spiritual Care assessment completed on January 2, 2017 by Tyrell Waldron. Patient was initially at [facility] and when she became ill was hospitalized with pneumonia and sepsis. She is now GIP level of care. Patient is of R.CELI Gallagher. During 's visit family shared a touching story of how patient led her daughter to Levar Raker is very important to this family. Family expressed feelings of peace with decisions for patient to be at [facility].  provide support with active listening, compassion and prayer. [employer] to follow up. Signed by: Damon Briones       [facility] Volunteer Notes by Wilder Teresa at 01/03/17 1124  Version 1 of 1    Author:  Wilder Teresa Service:  Mary Tucker Author Type:  Hospice Volunteer/    Filed:  01/03/17 1124 Date of Service:  01/03/17 1124 Status:  Signed    :  Wilder Teresa (Hospice Volunteer/)               [facility] Interdisciplinary Plan of Care Review     Status Codes I = Initiated C=Continued R=Revised RS = Resolved     I Volunteer     Goal: Hospice house volunteer (s) enhances the quality of remaining life while patient is at the hospice house. Interventions: Diamond Blanco Pall Volunteer (s) will provide companionship to the patient and/or family by visiting at the hospice house       . Diamond Odomne Pall Volunteer (s) will provide respite as needed when requested by patient and/or family. Candance Huger Dwight Lien  Volunteer will provide activities such as music, reading, pet therapy, etc. as requested. Candance Huger Dwight Lien  Comfort bag delivered. Any other special requests or information regarding volunteer services:     No further needs identified at this time. These notes have been discussed in [facility] meeting.

## 2017-02-13 NOTE — ROUTINE PROCESS
Pt I'd by name and . Pt with facial grimace and moans. Flacc =3..  Morphine 8mg sq given in right upper arm. Resp non labored on RA. Lungs diminished. BS diminished. HR irreg. Edema  2+ in BUE and 3+ in BLE noted at this time. Prater cath draining  manuel urine. SR up x2. Bed low/locked. Call light with in reach. Door opened. Family at the bedside.

## 2017-02-14 NOTE — PROGRESS NOTES
Patient's daughter Olivier Bone was crying in the hallway.  spoke to her she said she just realized the lady across the elizalde had . Olivier Bone had become close to the family during their stay.  embraced Olivier Bone and listened to her share her sorrow. After a few minutes she composed herself and assured  she would be fine.  reminded her of continued available support throughout the day.

## 2017-02-14 NOTE — PROGRESS NOTES
Progress Note    Patient: Yefri Andrews MRN: [id number]  SSN: [id number]    YOB: 1933  Age: 80 y.o. Sex: female      Admit Date: 1/2/2017    LOS: 43 days     Subjective:     Obtunded. NPO. Has required 4 doses of morphine for pain and Haldol x 1 SQ. Family at bedside.     Review of Systems:  Review of systems not obtained due to patient factors. Objective:     Vitals:    02/12/17 1751 02/13/17 0744 02/13/17 1939 02/14/17 0535   BP: 120/56 104/51 113/58 109/50   Pulse: 75 63 83 74   Resp: 16 17 10 16   Temp: 96.6 °F (35.9 °C) 96.1 °F (35.6 °C) 95.8 °F (35.4 °C) 96.2 °F (35.7 °C)   Weight:       Height:            Intake and Output:  Current Shift:    Last three shifts: 02/12 1901 - 02/14 0700  In: 0   Out: 325 [Urine:325]    Physical Exam:   GENERAL: Obtunded, no distress  LUNG: Coarse breath sounds. Shallow, labored respirations. HEART: regular rate and rhythm, S1, S2 normal, no murmur, click, rub or gallop  ABDOMEN: soft, non-tender. Bowel sounds hypoactive. : Prater catheter with cloudy, dark manuel urine. EXTREMITIES: extremities normal, atraumatic, no cyanosis. Mild generalized edema. SKIN: Cool to touch. Stage 2 on coccyx, DTI bilateral buttocks, Two areas Stage II on right shoulder with foam dressing. NEUROLOGIC: Obtunded. Bedbound. Unable to follow commands.      Lab/Data Review:  No new labs resulted in the last 24 hours.     Assessment:     Principal Problem:    Acute respiratory failure with hypoxia ([facility] [address] (1/2/2017)        Plan:     Current Facility-Administered Medications   Medication Dose Route Frequency    morphine injection 8 mg  8 mg SubCUTAneous Q30MIN PRN    fentaNYL (DURAGESIC) 50 mcg/hr patch 1 Patch  1 Patch TransDERmal Q72H    fentaNYL (DURAGESIC) 100 mcg/hr patch 1 Patch  1 Patch TransDERmal Q72H    LORazepam (ATIVAN) tablet 1 mg  1 mg SubLINGual Q4H PRN    LORazepam (ATIVAN) injection 1 mg  1 mg IntraMUSCular Q4H PRN    haloperidol lactate (HALDOL) injection 2 mg  2 mg SubCUTAneous Q1H PRN    diphenhydrAMINE (BENADRYL) injection 25 mg  25 mg IntraMUSCular Q6H PRN    acetaminophen (TYLENOL) suppository 650 mg  650 mg Rectal Q3H PRN    bisacodyl (DULCOLAX) suppository 10 mg  10 mg Rectal PRN    haloperidol lactate (HALDOL) injection 2 mg  2 mg SubCUTAneous Q1H PRN    albuterol-ipratropium (DUO-NEB) 2.5 MG-0.5 MG/3 ML  3 mL Nebulization Q4H PRN    glycopyrrolate (ROBINUL) injection 0.2 mg  0.2 mg SubCUTAneous Q4H PRN       Admitted with acute hypoxic respiratory failure for management of pain, dyspnea and delirium.     1. Pain: Morphine as ordered. Fentanyl patch as ordered.      2. Dyspnea: Morphine as ordered. Nebulizers prn. Glycopyrrolate prn secretions. Oxygen prn.     3. Delirium: Haloperidol as ordered. Ativan as ordered.     4. Family/Pt Support: Family at bedside during exam. Medications and plan of care discussed with nursing staff and family. Will continue to monitor for symptoms and adjust medications as needed to maintain patient comfort. PPS 10%. Case discussed with Dr. Na Hogan.     Signed By: Mercedes Do NP     February 14, 2017

## 2017-02-14 NOTE — PROGRESS NOTES
Patient's daughter refused digital rectal exam and  bisacodyl for no bowel movement since 02/10/2017.

## 2017-02-14 NOTE — PROGRESS NOTES
Summary Note- Patient is responsive to pain. Required SQ PRN medications for pain. Mouth care only. Prater to gravity; no bowel movement this shift. Family refused digital exam/bisacodyl suppository. Dressings changed per order. Patient safety maintained through hourly rounding: bed low and locked, side rails x2, tab alerts on, call light within reach, and door open for continuous monitoring.

## 2017-02-14 NOTE — PROGRESS NOTES
Received report from day shift RN. Patient resting quietly. Identified by name and date of birth on armband. Verified Fentanyl patches. No s/sx pain, agitation, dyspnea, or N/V. Bed low and locked, side rails x2, tab alerts on, call light within reach, and door open for continuous monitoring. Family at bedside.

## 2017-02-15 NOTE — PROGRESS NOTES
Received report from day shift RN. Patient resting quietly with family at bedside. Identified by name and date of birth on armband. Verified Fentanyl patches on right chest with off-going RN. No s/sx pain, agitation, dyspnea, or N/V. Bed low and locked, side rails x2, tab alerts on, call light within reach, and door open for continuous monitoring.

## 2017-02-15 NOTE — PROGRESS NOTES
Progress Note    Patient: Kevin Garcia MRN: [id number]  SSN: [id number]    YOB: 1933  Age: 80 y.o. Sex: female      Admit Date: 1/2/2017    LOS: 44 days     Subjective:     Obtunded. NPO. Has required 7 doses of morphine for pain. Family at bedside.     Review of Systems:  Review of systems not obtained due to patient factors. Objective:     Vitals:    02/13/17 1939 02/14/17 0535 02/14/17 1826 02/15/17 0556   BP: 113/58 109/50 115/56 103/51   Pulse: 83 74 72 72   Resp: 10 16 10 17   Temp: 95.8 °F (35.4 °C) 96.2 °F (35.7 °C) 96 °F (35.6 °C) 96.6 °F (35.9 °C)   Weight:       Height:            Intake and Output:  Current Shift:    Last three shifts: 02/13 1901 - 02/15 0700  In: 0   Out: 500 [Urine:500]    Physical Exam:   GENERAL: Eyes open, nonverbal, no distress  LUNG: Coarse breath sounds. Shallow, labored respirations. HEART: regular rate and rhythm, S1, S2 normal, no murmur, click, rub or gallop  ABDOMEN: soft, non-tender. Bowel sounds hypoactive. : Prater catheter with cloudy, dark manuel urine. EXTREMITIES: extremities normal, atraumatic, no cyanosis. Mild generalized edema. SKIN: Cool to touch. Stage 2 on coccyx, DTI bilateral buttocks, Two areas Stage II on right shoulder with foam dressing. NEUROLOGIC: Eyes open, nonverbal. Bedbound. Unable to follow commands.      Lab/Data Review:  No new labs resulted in the last 24 hours.     Assessment:     Principal Problem:    Acute respiratory failure with hypoxia ([facility] [address] (1/2/2017)        Plan:     Current Facility-Administered Medications   Medication Dose Route Frequency    morphine injection 8 mg  8 mg SubCUTAneous Q30MIN PRN    fentaNYL (DURAGESIC) 50 mcg/hr patch 1 Patch  1 Patch TransDERmal Q72H    fentaNYL (DURAGESIC) 100 mcg/hr patch 1 Patch  1 Patch TransDERmal Q72H    LORazepam (ATIVAN) tablet 1 mg  1 mg SubLINGual Q4H PRN    LORazepam (ATIVAN) injection 1 mg  1 mg IntraMUSCular Q4H PRN    haloperidol lactate (HALDOL) injection 2 mg  2 mg SubCUTAneous Q1H PRN    diphenhydrAMINE (BENADRYL) injection 25 mg  25 mg IntraMUSCular Q6H PRN    acetaminophen (TYLENOL) suppository 650 mg  650 mg Rectal Q3H PRN    bisacodyl (DULCOLAX) suppository 10 mg  10 mg Rectal PRN    haloperidol lactate (HALDOL) injection 2 mg  2 mg SubCUTAneous Q1H PRN    albuterol-ipratropium (DUO-NEB) 2.5 MG-0.5 MG/3 ML  3 mL Nebulization Q4H PRN    glycopyrrolate (ROBINUL) injection 0.2 mg  0.2 mg SubCUTAneous Q4H PRN       Admitted with acute hypoxic respiratory failure for management of pain, dyspnea and delirium.     1. Pain: Morphine as ordered. Fentanyl patch as ordered. Increased to 200mcg.     2. Dyspnea: Morphine as ordered. Nebulizers prn. Glycopyrrolate prn secretions. Oxygen prn.     3. Delirium: Haloperidol as ordered. Ativan as ordered.     4. Family/Pt Support: Family at bedside during exam. Medications and plan of care discussed with nursing staff and family. Will continue to monitor for symptoms and adjust medications as needed to maintain patient comfort. PPS 10%. Case discussed with Dr. Ardia Seip.     Signed By: Wes Corrales NP     February 15, 2017

## 2017-02-16 NOTE — [facility] IDG CHAPLAIN NOTES
Patient: Nicki Velarde    Date: 02/16/17  Time: 6:36 PM    [facility]  Notes    Continued support through words of encouragement to family and meaningful conversation. Patient appears to be declining and sleeping most of the time. Sometimes it appears she is sleeping with her eyes open. Her daughter Alisha Uribe is tearful but leans on her clifford. She is so appreciative of [employer]. Patient's granddaughter who recently lost her  has begun visiting her grandmother and spending time with mom. Continued support to be provided while patient is with Myles Valles.          Signed by: Matilde Moreau

## 2017-02-16 NOTE — PROGRESS NOTES
Report received from off going RN. Patient round. Patient identified by name and . Patient showing signs of pain at this time. Will medicate as ordered. Bed low and locked. Call bell within reach. Family at the bedside.

## 2017-02-17 NOTE — [facility] IDG NURSE NOTES
Patient: Randall Evans    Date: 02/17/17  Time: 3:12 PM    \[facility] [place] [facility]\"" Nurse Notes    UPDATE: fentanyl increased to 200 mcg two days ago r/t frequency of breakthrough pain medications; continues to require frequent PRN injectable morphine for breakthrough pain; PRN haldol injectables for delirium; extensive emotional support provided for patient's daughter, Allison Jackson, r/t the death of another long-term patient she had gotten close with    Goals of care:  increase fentanyl to 250 mcg; encourage use of synergistic effects of combination of haldol with PRN morphine; effectiveness of symptom management continuously evaluated to achieve optimum comfort and ultimately a peaceful death        Signed by: Jaylene Jhaveri

## 2017-02-17 NOTE — PROGRESS NOTES
Received report from night shift RN. Patient lethargic. Verified Fentanyl patches on abdomen. Identified by name and date of birth on armband. No s/sx pain, agitation, dyspnea, or N/V. Bed low and locked, side rails x2, tab alerts on, call light within reach, and door closed with daughter at bedside.

## 2017-02-17 NOTE — [facility] IDG SOCIAL WORKER NOTES
Patient: Mounika Rey    Date: 02/17/17  Time: 3:05 PM    [facility]  Notes  LMSW to continue with support for pt and daughter and family. Other family was in the room today and I stopped in and they were giving her water by the spoonful. LMSW asked if they were giving her thickened water. The water was not thickened.        Signed by: Mable Corral

## 2017-02-17 NOTE — PROGRESS NOTES
Summary Note- Patient is lethargic. Required SQ PRN medication for pain x4. Mouth care only. Prater to gravity; no bowel movement this shift. Fentanyl patches x3 on right abdomen. Patient safety maintained through hourly rounding: bed low and locked, side rails x2, tab alerts on, call light within reach, and door open for continuous monitoring.

## 2017-02-17 NOTE — [facility] IDG MASTER NOTE
[facility] Collabor[facility]  Date: 02/20/17  Time: 1:19 PM    ___________________    Patient: Caity Willson    ___________________    Diagnoses:  Diagnoses of Neuralgia and neuritis and Spinal stenosis of lumbar region were pertinent to this visit. Current Medications:    Current Facility-Administered Medications:     fentaNYL (DURAGESIC) 100 mcg/hr patch 2 Patch, 2 Patch, TransDERmal, Q72H, 2 Patch at 02/17/17 1612 **AND** fentaNYL (DURAGESIC) 50 mcg/hr patch 1 Patch, 1 Patch, TransDERmal, Q72H, Kaiser Barajas NP, 1 Patch at 02/17/17 1612    morphine injection 8 mg, 8 mg, SubCUTAneous, Q30MIN PRN, 8 mg at 02/20/17 0443 **OR** [DISCONTINUED] morphine injection 2 mg, 2 mg, IntraVENous, Q20MIN PRN, Sam Andover, NP    LORazepam (ATIVAN) tablet 1 mg, 1 mg, SubLINGual, Q4H PRN, Sam Andover, NP, 1 mg at 02/01/17 0402    LORazepam (ATIVAN) injection 1 mg, 1 mg, IntraMUSCular, Q4H PRN, Sam Andover, NP, 1 mg at 02/18/17 0836    haloperidol lactate (HALDOL) injection 2 mg, 2 mg, SubCUTAneous, Q1H PRN, Sam Andover, NP, 2 mg at 02/16/17 0958    diphenhydrAMINE (BENADRYL) injection 25 mg, 25 mg, IntraMUSCular, Q6H PRN, Sam Andover, NP    acetaminophen (TYLENOL) suppository 650 mg, 650 mg, Rectal, Q3H PRN, Sam Andover, NP    bisacodyl (DULCOLAX) suppository 10 mg, 10 mg, Rectal, PRN, Sam Andover, NP, 10 mg at 02/03/17 1055    haloperidol lactate (HALDOL) injection 2 mg, 2 mg, SubCUTAneous, Q1H PRN, Sam Andover, NP, 2 mg at 02/15/17 2224    albuterol-ipratropium (DUO-NEB) 2.5 MG-0.5 MG/3 ML, 3 mL, Nebulization, Q4H PRN, Sam Andover, NP    glycopyrrolate (ROBINUL) injection 0.2 mg, 0.2 mg, SubCUTAneous, Q4H PRN, Sam Andover, NP, 0.2 mg at 02/16/17 1529    Orders: Allergies:   Allergies   Allergen Reactions    Lortab [Hydrocodone-Acetaminophen] Unknown (comments) and Nausea and Vomiting       ___________________    Care Team Notes          POC/IDG Notes [address] [place] [address] Nurse Notes by Pattie Fields at 02/17/17 1512  Version 1 of 1    Author:  Pattie Fields Service:  Sherie White Author Type:  Registered Nurse    Filed:  02/17/17 1516 Date of Service:  02/17/17 1512 Status:  Signed    :  Pattie Fields (Registered Nurse)           Patient: Maira Fernanda Ceballos    Date: 02/17/17  Time: 3:12 PM    [facility] Nurse Notes    UPDATE: fentanyl increased to 200 mcg two days ago r/t frequency of breakthrough pain medications; continues to require frequent PRN injectable morphine for breakthrough pain; PRN haldol injectables for delirium; extensive emotional support provided for patient's daughter, David Robb, r/t the death of another long-term patient she had gotten close with    Goals of care:  increase fentanyl to 250 mcg; encourage use of synergistic effects of combination of haldol with PRN morphine; effectiveness of symptom management continuously evaluated to achieve optimum comfort and ultimately a peaceful death        Signed by: Pattie Fields       [address] [place] [address]  Notes by Vladimir Miller at 02/16/17 1836  Version 1 of 1    Author:  Vladimir Miller Service:  Spiritual Care Author Type:  Pastoral Care    Filed:  02/17/17 1515 Date of Service:  02/16/17 1836 Status:  Signed    :  Vladimir Miller (Pastoral Care)           Patient: Maria Fernanda Ceballos    Date: 02/16/17  Time: 6:36 PM    [facility]  Notes    Continued support through words of encouragement to family and meaningful conversation. Patient appears to be declining and sleeping most of the time. Sometimes it appears she is sleeping with her eyes open. Her daughter David Robb is tearful but leans on her clifford. She is so appreciative of [employer]. Patient's granddaughter who recently lost her  has begun visiting her grandmother and spending time with mom. Continued support to be provided while patient is with Sara Lopez.          Signed by: Vladimir Miller       [address] [place] [address]  Notes by Leland Wong at 02/17/17 1505  Version 1 of 1    Author:  Simona Hurtado Service:  Tashia Jacobson Author Type:      Filed:  02/17/17 1513 Date of Service:  02/17/17 1505 Status:  Signed    :  Simona Hurtado ()           Patient: Ira Saldivar    Date: 02/17/17  Time: 3:05 PM    [facility]  Notes  LMSW to continue with support for pt and daughter and family. Other family was in the room today and I stopped in and they were giving her water by the spoonful. LMSW asked if they were giving her thickened water. The water was not thickened. Signed by: Simona Hurtado       [facility] IDG Volunteer Notes by Danny Parker at 02/17/17 1400  Version 1 of 1    Author:  Danny Parker Service:  Tashia Jacobson Author Type:  Hospice Volunteer/    Filed:  02/17/17 1400 Date of Service:  02/17/17 1400 Status:  Signed    :  Danny Parker (Hospice Volunteer/)               128 Levine, Susan. \Hospital Has a New Name and Outlook.\"" Interdisciplinary Plan of Care Review     Status Codes I = Initiated C=Continued R=Revised RS = Resolved     C Volunteer     Goal: Hospice house volunteer (s) enhances the quality of remaining life while patient is at the hospice house. Interventions: Starlyn Plough Starlyn Plough Arland Ahumada Volunteer (s) will provide companionship to the patient and/or family by visiting at the hospice house       . Starlyn Plough Arland Ahumada Volunteer (s) will provide respite as needed when requested by patient and/or family. Chico Grossman  Volunteer will provide activities such as music, reading, pet therapy, etc. as requested. Chico Grossman  Comfort bag delivered. Any other special requests or information regarding volunteer services:    Two pages of visits recorded for companionship, prayer, pet therapy. No further needs identified at this time. These notes have been discussed in 888 [facility] meeting.        [facility] IDG Nurse Notes by Sebastian Arboleda at 02/13/17 4834  Version 1 of 1    Author:  Sebastian Arboleda Service: HOSPICE Author Type:  Registered Nurse    Filed:  02/13/17 1300 Date of Service:  02/13/17 1258 Status:  Signed    :  Cornel Hardy (Registered Nurse)           Patient: Angelito Gomez    Date: 02/13/17  Time: 12:58 PM    [facility] Nurse Notes    UPDATE: patient continues with current POC; continues to receive PRN injectable morphine for pain in addition to 150 mcg fentanyl patch and haldol injectables for agitation; ongoing family support     Goals of care: effectiveness of symptom management continuously evaluated to achieve optimum comfort and ultimately a peaceful death        Signed by: Cornel Hardy       [facility] IDG  Notes by Justine Lim at 02/13/17 1252  Version 1 of 1    Author:  Justine Lim Service:  Spiritual Care Author Type:  Pastoral Care    Filed:  02/13/17 1258 Date of Service:  02/13/17 1252 Status:  Signed    :  Justine Lim (Pastoral Care)           Patient: Angelito Gomez    Date: 02/13/17  Time: 12:52 PM    [facility]  Notes     continues provided routine visits for patient and family. Daughter expressed appreciation for all the love and support. Signed by: Justine Lim       [facility] IDG  Notes by Odilon Epstein at 02/13/17 1247  Version 1 of 1    Author:  Odilon Epstein Service:  Steff Vincent Author Type:      Filed:  02/13/17 1248 Date of Service:  02/13/17 1247 Status:  Signed    :  Odilon Epstein ()           Patient: Angelito Gomez    Date: 02/13/17  Time: 12:47 PM    [facility]  Notes  LMSW visited this morning to provide emotional support to Ayush Mar and the pt. No changes to the plan of care.           Signed by: Odilon Epstein       [facility] IDG Nurse Notes by Cornel Hardy at 02/10/17 1303  Version 1 of 1    Author:  Cornel Hardy Service:  Steff Vincent Author Type:  Registered Nurse    Filed:  02/10/17 1306 Date of Service:  02/10/17 1303 Status:  Signed    :  Cornel Hardy (Registered Nurse)           Patient: Sameer Parsons    Date: 02/10/17  Time: 1:03 PM    [address] Nurse Notes    UPDATE: patient mostly unresponsive with intermittent periods of light arousal noted; fentanyl patch remains at 150 mcg and PRN morphine injectables used for breakthrough pain; despite ongoing skin breakdown r/t extensive time without oral intake - will hold off on changing to air mattress for patient comfort and ongoing decline; continues to receive PRN injectable haldol for agitation and delirium; ongoing family support    Goals of care: effectiveness of symptom management continuously evaluated to achieve optimum comfort and ultimately a peaceful death        Signed by: Cherise Victoria       [address] IDG  Notes by Eliel Keith at 02/10/17 1302  Version 1 of 1    Author:  Eliel Keith Service:  Rajni Jesus Author Type:      Filed:  02/10/17 1306 Date of Service:  02/10/17 1302 Status:  Signed    :  Eliel Keith ()           Patient: Sameer Parsons    Date: 02/10/17  Time: 1:02 PM    [facility]  Notes  LMSW continue to provide emotional support and active listening. Pt always has family in the room with her. They are very supportive. Signed by: Eliel Keith       [facility]  [facility] by Mikayla Colon at 02/09/17 5868  Version 1 of 1    Author:  Mikayla Colon Service:  Spiritual Care Author Type:  Pastoral Care    Filed:  02/10/17 1305 Date of Service:  02/09/17 1658 Status:  Signed    :  Mikayla Colon (Pastoral Care)           Patient: Sameer Parsons    Date: 02/09/17  Time: 4:58 PM    [facility]  Notes     has provided care for patient and family throughout patient's stay with [facility].  has offered emotional support, opportunity for open communication, grief care and prayer. Family is coping very well considering the long stay at [facility] and the recent death in their family.   Patient's granddaughter came to visit this week. ( the one whose  }   offered support to her this week as well through active listening, compassion and joining with volunteers in prayer for her. Signed by: Ava Calvillo       [address] [place] [address] Volunteer Notes by Loni Wilson at 02/10/17 1235  Version 1 of 1    Author:  Loni Wilson Service:  Celio Carl Author Type:  Hospice Volunteer/    Filed:  02/10/17 1236 Date of Service:  02/10/17 1235 Status:  Signed    :  Loni Wilson (Hospice Volunteer/)               128 [facility] Interdisciplinary Plan of Care Review     Status Codes I = Initiated C=Continued R=Revised RS = Resolved     C. Volunteer     Goal: Hospice house volunteer (s) enhances the quality of remaining life while patient is at the hospice house. Interventions: Christiana Baumann Volunteer (s) will provide companionship to the patient and/or family by visiting at the hospice house       . Christiana Baumann Volunteer (s) will provide respite as needed when requested by patient and/or family. Christiana Hollis  Volunteer will provide activities such as music, reading, pet therapy, etc. as requested. Christiana Hollis  Comfort bag delivered. Any other special requests or information regarding volunteer services:    Multiple visits are recorded for pet therapy, companionship, prayer, and volunteer nursing assistance. No further needs identified at this time. These notes have been discussed in 888 [facility] meeting.         Signed by: Loni Wilson       [address] [place] [address] Nurse Notes by Rayshawn Ashby at 17 1322  Version 1 of 1    Author:  Rayshawn Ashby Service:  Celio Carl Author Type:  Registered Nurse    Filed:  17 1327 Date of Service:  17 1322 Status:  Signed    :  Rayshawn Ashby (Registered Nurse)           Patient: Radha Timmons    Date: 17  Time: 1:22 PM    [facility] Nurse Notes    UPDATE: patient continues to require PRN injectables for pain [morphine] and agitation [haldol] over the weekend despite increase of fentanyl patch to 150 mcg; ongoing social/emotional support for family    Goals of care: increase PRN morphine dose to 8 mg every 30 minutes; effectiveness of symptom management continuously evaluated to achieve optimum comfort and ultimately a peaceful death         Signed by: Peggy Mcnamara       [facility] IDG  Notes by Manohar Stanley at 02/06/17 1321  Version 1 of 1    Author:  Manohar Stanley Service:  Spiritual Care Author Type:  Pastoral Care    Filed:  02/06/17 1324 Date of Service:  02/06/17 1321 Status:  Signed    :  Manohar Stanley (Pastoral Care)           Patient: Kevin Garcia    Date: 02/06/17  Time: 1:21 PM    [facility]  Notes     continues to provide care for patient and her family. [facility] has recently  focused on grief support for daughter and son in law  given the recent family death. Daughter has been tearful at times but is able to compose herself quickly. Signed by: Manohar Stanley       [facility] IDG  Notes by Magda Flower at 02/06/17 1320  Version 1 of 1    Author:  Magda Flower Service:  Claire Malik Author Type:      Filed:  02/06/17 1322 Date of Service:  02/06/17 1320 Status:  Signed    :  Magda Flower ()           Patient: Kevin Garcia    Date: 02/06/17  Time: 1:20 PM    [facility]  Notes    LMSW to continue to provide emotional support for the daughter. Signed by: Magda Flower       [facility] IDG  Notes by Manohar Stanley at 02/03/17 1030  Version 1 of 1    Author:  Manohar Stanley Service:  Spiritual Care Author Type:  Pastoral Care    Filed:  02/03/17 1308 Date of Service:  02/03/17 1030 Status:  Signed    :  Manohar Stanley (Pastoral Care)           Patient: Kevin Garcia    Date: 02/03/17  Time: 10:30 AM    [facility]  Notes  Difficult week for the family.   Patient's grand son in law  this week. Patient's daughter Matt Wellington has been more emotional throughout this time. She has gone today to attend the  while volunteers sit with her mom.  spoke to her as she left this morning offering a hug and prayer. She thanked  for being there for her as tears rolled down her cheeks. Patient appears to be declining. Patient's ability to interact is much more limited. Signed by: Manohar Stanley       [facility] IDG Nurse Notes by Peggy Mcnamara at 17 1303  Version 1 of 1    Author:  Peggy Mcnamara Service:  Claire Malik Author Type:  Registered Nurse    Filed:  17 1307 Date of Service:  17 1303 Status:  Signed    :  Peggy Mcnamara (Registered Nurse)           Patient: Kevin Garcia    Date: 17  Time: 1:03 PM    [facility] Nurse Notes    UPDATE: patient continues on fentanyl patch 125 mcg and requiring frequent PRN morphine injectables for breakthrough pain; continues to receive PRN injectable haldol for agitation and lorazepam for anxiety; ongoing support for family     Goals of care: increase fentanyl patch to 150 mcg for better long-acting pain medication; effectiveness of symptom management continuously evaluated to achieve optimum comfort            Signed by: Peggy Mcnamara       [facility] IDG  Notes by Magda Flower at 17 1302  Version 1 of 1    Author:  Magda Flower Service:  Claire Malik Author Type:      Filed:  17 1306 Date of Service:  17 1302 Status:  Signed    :  Magda Flower ()           Patient: Kevin Garcia    Date: 17  Time: 1:02 PM    [facility]  Notes  LMSW to continue to provide emotional support to pt and Matt Wellington. Yajaira's son in [facility] was today. We had a volunteer sit with her while Matt Wellington is gone. [employer] has been involved.           Signed by: Magda Flower       [facility] IDG Volunteer Notes by Amber Benítez Ruby at 02/03/17 1155  Version 1 of 1    Author:  Fernando Mcfarland Service:  J Luis Hurtado Author Type:  Hospice Volunteer/    Filed:  02/03/17 1156 Date of Service:  02/03/17 1155 Status:  Signed    :  Fernando Mcfarland (Hospice Volunteer/)               128 [facility] [place] Interdisciplinary Plan of Care Review     Status Codes I = Initiated C=Continued R=Revised RS = Resolved     C. Volunteer     Goal: Hospice house volunteer (s) enhances the quality of remaining life while patient is at the hospice house. Interventions: Jaya Moncada Volunteer (s) will provide companionship to the patient and/or family by visiting at the hospice house       . Jaya Moncada Volunteer (s) will provide respite as needed when requested by patient and/or family. Jaya Strange  Volunteer will provide activities such as music, reading, pet therapy, etc. as requested. Jaya Strange  Comfort bag delivered. Any other special requests or information regarding volunteer services:     Multiple visits for prayer, companionship, pet therapy, and volunteer nursing assistance are recorded. No further needs identified at this time. These notes have been discussed in 888 [facility] meeting.         Signed by: Fernando Mcfarland       [address] [place] [address] Nurse Notes by Davis Tabor at 01/30/17 1252  Version 1 of 1    Author:  Davis Tabor Service:  J Luis Hurtado Author Type:  Registered Nurse    Filed:  01/30/17 1256 Date of Service:  01/30/17 1252 Status:  Signed    :  Davis Tabor (Registered Nurse)           Patient: Ana Tesfaye    Date: 01/30/17  Time: 12:52 PM    [facility] Nurse Notes    UPDATE: patient remains on fentanyl patch 125 mcg and receiving PRN morphine injectables to manage breakthrough pain; minimal fluid intake; urine output dropped over the weekend and only 200 past 24 hours; newly febrile and an increase in agitation overnight requiring PRN injectable haldol; ongoing emotional support to family    Goals of care: effectiveness of symptom management continuously evaluated to achieve optimum symptom management        Signed by: Lanre Mckeon       [address] [place] [address]  Notes by Matilde Moreau at 01/30/17 1250  Version 1 of 1    Author:  Matilde Moreau Service:  Spiritual Care Author Type:  Pastoral Care    Filed:  01/30/17 1254 Date of Service:  01/30/17 1250 Status:  Signed    :  Matilde Moreau (Pastoral Care)           Patient: Nicki Velarde    Date: 01/30/17  Time: 12:50 PM    [facility]  Notes     continues to be available for patient and family. Since last visit  has spoken to daughter on a couple of occassions outside the room. Family is at peace with patient's impending death. Yajaira's son in law has cancer and is under hospice care. The family appears to bee dealing well with all the stress. Signed by: Matilde Moreau       [address] [place] [address]  Notes by Judie Summers at 01/30/17 1247  Version 1 of 1    Author:  Judie Summers Service:  Stevie Heller Author Type:      Filed:  01/30/17 1254 Date of Service:  01/30/17 1247 Status:  Signed    :  Judie Summers ()           Patient: Nicki Velarde    Date: 01/30/17  Time: 12:47 PM    [facility]  Notes  Today,  emotional support was provided to daughter. Pt continues to decline. Family is surprised at how long she has survived without food. Family states she has not had any food in 2 weeks.           Signed by: Judie Summers       [address] [place] [address]  Notes by Judie Summers at 01/27/17 1234  Version 1 of 1    Author:  Judie Summers Service:  Stevie Heller Author Type:      Filed:  01/27/17 1238 Date of Service:  01/27/17 1234 Status:  Signed    :  Judie Summers ()           Patient: Nicki Velarde    Date: 01/27/17  Time: 12:34 PM    [facility]  Notes  SW to continue with support to the [place] the daughter. She does not leave her bedside very often. Pt was awake today. Pt is still showing signs of agitation and pain. Signed by: Yajaira Villa       [facility] IDG  Notes by Zakia Garcia at 01/27/17 1230  Version 1 of 1    Author:  Zakia Garcia Service:  Spiritual Care Author Type:  Pastoral Care    Filed:  01/27/17 1238 Date of Service:  01/27/17 1230 Status:  Signed    :  Zakia Garcia (Pastoral Care)           Patient: Melissa Edmonds    Date: 01/27/17  Time: 12:30 PM    [facility]  Notes    's last encounter with the was in the cafeteria. Daughter and son in law continue to depend on God to give them strength for the journey. They are at peace with mom's impending death. No new spiritual issues. Signed by: Zakia WILKINSONFINN [facility] IDG Volunteer Notes by Franki Gottlieb at 01/27/17 1151  Version 1 of 1    Author:  Franki Gottlieb Service:  Pallavi Mejia Author Type:  Hospice Volunteer/    Filed:  01/27/17 1152 Date of Service:  01/27/17 1151 Status:  Signed    :  Franki Gottlieb (Hospice Volunteer/)               128 [facility] Interdisciplinary Plan of Care Review     Status Codes I = Initiated C=Continued R=Revised RS = Resolved     C Volunteer     Goal: Hospice house volunteer (s) enhances the quality of remaining life while patient is at the hospice house. Interventions: Salma Skelton Volunteer (s) will provide companionship to the patient and/or family by visiting at the hospice house       . Salma Skelton Volunteer (s) will provide respite as needed when requested by patient and/or family. Salma Maravilla  Volunteer will provide activities such as music, reading, pet therapy, etc. as requested. Salma Maravilla  Comfort bag delivered. Any other special requests or information regarding volunteer services:    13 visits recorded for prayer, pet therapy, companionship.  No further needs identified at this time.      These notes have been discussed in [facility] [employer] meeting. [facility] IDG  Notes by Vani Guerrero at 01/23/17 1113  Version 1 of 1    Author:  Vani Guerrero Service:  Spiritual Care Author Type:  Pastoral Care    Filed:  01/23/17 1333 Date of Service:  01/23/17 1113 Status:  Signed    :  Vani Guerrero (Pastoral Care)           Patient: Coretta Peres    Date: 01/23/17  Time: 11:13 AM    [facility]  Notes    Patient and family care being provided. Patient and family are at peace with patient's impending death.  to offer emotional and spiritual support. Family is involved in a local [employer] and  is supportive. Patient's grandson in law continues to struggle with cancer. Signed by: Vani Guerrero       [facility] IDG  Notes by Aarti Hassan at 01/23/17 1330  Version 1 of 1    Author:  Aarti Hassan Service:  Rossy Carl Author Type:      Filed:  01/23/17 1333 Date of Service:  01/23/17 1330 Status:  Signed    :  Aarti Hassan ()           Patient: Coretta Peres    Date: 01/23/17  Time: 1:30 PM    [facility]  Notes  LMSW will continue to visit with the family to provide emotional support. Signed by: Aarti Hassan       [facility] IDG Volunteer Notes by Lieutenant Benitez at 01/20/17 1443  Version 1 of 1    Author:  Lieutenant Benitez Service:  Rossy Carl Author Type:  Hospice Volunteer/    Filed:  01/20/17 1445 Date of Service:  01/20/17 1443 Status:  Signed    :  Lieutenant Benitez (Hospice Volunteer/)               [facility] [place] [place] Interdisciplinary Plan of Care Review     Status Codes I = Initiated C=Continued R=Revised RS = Resolved     C. Volunteer     Goal: Hospice house volunteer (s) enhances the quality of remaining life while patient is at the hospice house. Interventions: Katherene Means Katherene Means Clerance Sprinkles Volunteer (s) will provide companionship to the patient and/or family by visiting at the hospice house       . Qiana Taylor Volunteer (s) will provide respite as needed when requested by patient and/or family. Qiana Condon  Volunteer will provide activities such as music, reading, pet therapy, etc. as requested. Qiana Condon  Comfort bag delivered. Any other special requests or information regarding volunteer services:    Seven visits are recorded for prayer, companionship, and volunteer nursing assistance. Comfort bag delivered. Extra visits are requested for companionship. No further needs identified at this time. These notes have been discussed in 888 [facility] meeting. Signed by: Coreen WILKINSONR [facility] IDG  Notes by Shama Mcgrath at 01/20/17 1412  Version 1 of 1    Author:  Shama Mcgrath Service:  Kvng Ferreira Author Type:      Filed:  01/20/17 1414 Date of Service:  01/20/17 1412 Status:  Signed    :  Shama Mcgrath ()           Patient: No Berg    Date: 01/20/17  Time: 2:12 PM    [facility]  Notes  LMSW to continue to provide emotional support for the patient and her daughter. Brigette Patel son in law has a brain tumor and he is on hospice as well. Signed by: Shama Mcgrath       [facility] IDG  Notes by Lars Brewer at 01/19/17 1618  Version 1 of 1    Author:  Lars Brewer Service:  Spiritual Care Author Type:  Pastoral Care    Filed:  01/20/17 1414 Date of Service:  01/19/17 1618 Status:  Signed    :  Lars Brewer (Pastoral Care)           Patient: No Berg    Date: 01/19/17  Time: 4:18 PM    [facility]  Notes     continues to provide spiritual and emotional support for patient and family. During my last visit with the patient she was able to talk, although her voice was much weaker. Patient's daughter came to 's office 1/8/17 and was able to express her feelings. She leans heavily on her clifford as her main source of strength.   It is difficult having mom and son in law very sick at the same time. . She has told her daughter to stay at home with her  who is on hospice and take care of him. He has been able to visit patient once since she has been here but most of the time he is confined to home. Aguilar Gao will continue to provide support throughout patient's stay at [facility]. Signed by: Luis Haskins       [facility] IDG  Notes by Luis Haskins at 01/16/17 1407  Version 1 of 1    Author:  Luis Haskins Service:  Spiritual Care Author Type:  Pastoral Care    Filed:  01/16/17 1412 Date of Service:  01/16/17 1407 Status:  Signed    :  Luis Haskins (Pastoral Care)           Patient: Yefri Art    Date: 01/16/17  Time: 2:07 PM    [facility]  Notes     continues to be available for patient and family. Patient's daughter continues to be at the bedside faithfully. Her  gives her a break at times. Family feels mom is ready to go   Be with God. Daughter is at peace with mom's impending death. Patient waxes and wanes. During last encounter patient was awake and communicating.        Signed by: Luis Haskins       [facility] IDG Nurse Notes by Derrick Mcintosh at 01/16/17 1408  Version 1 of 1    Author:  Derrick Mcintosh Service:  Tony Chong Author Type:  Registered Nurse    Filed:  01/16/17 1411 Date of Service:  01/16/17 1408 Status:  Signed    :  Derrick Mcintosh (Registered Nurse)           Patient: Yefri Art    Date: 01/16/17  Time: 2:08 PM    [facility] Nurse Notes    UPDATE: fentanyl patch increased to 37.5 mcg over the weekend related to increased use of PRN medications; despite change, continues to receive a number of PRN injectable morphine doses to manage pain; continues receiving PRN injectable haldol for agitation and nausea - last episode of vomiting this morning; now receiving PRN glyco for secretions; ongoing family support     Goals of care: d/c roxanol; titrate fentanyl patch to 50 mcg to better manage pain with goal to minimize PRN injections; effectiveness of symptom management continuously evaluated to achieve optimum comfort and ultimately a peaceful death      Signed by: Kaylah Goldsmith       [facility] IDG  Notes by Missy Cortes at 01/16/17 1407  Version 1 of 1    Author:  Missy Cortes Service:  Rene Bettencourt Author Type:      Filed:  01/16/17 1409 Date of Service:  01/16/17 1407 Status:  Signed    :  Missy Cortes ()           Patient: Lars Tucker    Date: 01/16/17  Time: 2:07 PM    [facility]  Notes  Daughter Saran Ruiz at bedside. She vomited this am.  Family is always at bedside. LMSW continues to visit to provide emotional support, active listening and reassurance. Signed by: Missy Cortes       [facility] IDG  Notes by Missy Cortes at 01/13/17 1229  Version 1 of 1    Author:  Missy Cortes Service:  Rene Bettencourt Author Type:      Filed:  01/13/17 1235 Date of Service:  01/13/17 1229 Status:  Signed    :  Missy Cortes ()           Patient: Lars Tucker    Date: 01/13/17  Time: 12:29 PM    [facility]  Notes  LMSW will continue to provide emotional support. Pt was alert and oriented yesterday. Today she is barely opening her eyes. Family is usually at bedside. No financial concerns for family around final arrangements. Extra support to the daughter.         Signed by: Missy Cortes       [facility] Nurse Notes by Kaylah Goldsmith at 01/13/17 1230  Version 1 of 1    Author:  Kaylah Goldsmith Service:  Rene Bettencourt Author Type:  Registered Nurse    Filed:  01/13/17 6774 Date of Service:  01/13/17 1230 Status:  Signed    :  Kaylah Goldsmith (Registered Nurse)           Patient: Lars Tucker    Date: 01/13/17  Time: 12:30 PM    [facility] Nurse Notes    UPDATE: fentanyl patch increased to 25 mcg yesterday; since increase in patch, patient has required less but still requiring PRN morphine injectables for pain; continues to only take bites and sips; significantly less responsive overall than earlier this week; BP down into the 60's this morning; continues to receive PRN haldol injectables for agitation    Goals of care: will discontinue oral medications at this time; effectiveness of symptom management continuously evaluated to achieve optimum comfort and ultimately a peaceful death        Signed by: Sukhwinder Trejo       [facility]  Notes by Radha Leal at 01/12/17 1641  Version 1 of 1    Author:  Radha Leal Service:  Spiritual Care Author Type:  Pastoral Care    Filed:  01/13/17 1233 Date of Service:  01/12/17 1641 Status:  Signed    :  Radha Leal (Pastoral Care)           Patient: Joseph Wong    Date: 01/12/17  Time: 4:41 PM    [facility]  Notes     was quite surprised with my last encounter on 11/12/17. Patient was alert and quite able to communicate. She was oriented to herself, family and surroundings. She is grateful to be awake. She says the pain medicine causes her to sleep but she knows she needs that sleep. Visit was interrupted by Bernabe Miranda NP. Bernabe Miranda wanted  to keep talking while she examined her but  felt it was better to return at a later date.  will continue to be available for support to patient and family during patient's time at [facility]   According to Bernabe Miranda, LORENZO patient has changed today and is very sleepy again with little response.          Signed by: Radha [facility] IDG Volunteer Notes by Ivette Mullins at 01/13/17 1157  Version 1 of 1    Author:  Ivette Mullins Service:  Sandie Briseno Author Type:  Hospice Volunteer/    Filed:  01/13/17 1158 Date of Service:  01/13/17 1157 Status:  Signed    :  Ivette Mullins (Hospice Volunteer/)               [facility] Interdisciplinary Plan of Care Review     Status Codes I = Initiated C=Continued R=Revised RS = Resolved     C Volunteer     Goal: Hospice house volunteer (s) enhances the quality of remaining life while patient is at the hospice house. Interventions: Issa Porras Volunteer (s) will provide companionship to the patient and/or family by visiting at the hospice house       . Issa Porras Volunteer (s) will provide respite as needed when requested by patient and/or family. Issa Goodson  Volunteer will provide activities such as music, reading, pet therapy, etc. as requested. Issa Frasert Brad Goodson  Comfort bag delivered. Any other special requests or information regarding volunteer services:   Six visits recorded for companionship, prayer, visits with family. Daughter enjoys company, per team. Volunteers notified. No further needs identified at this time. These notes have been discussed in [facility]. [address] [place] [address]  Notes by Santhosh Jain at 17 [phone]  Version 1 of 1    Author:  Santhosh Jain Service:  Spiritual Care Author Type:  Pastoral Care    Filed:  17 1447 Date of Service:  17 0944 Status:  Signed    :  Santhosh Jain (Pastoral Care)           Patient: Mark Moore    Date: 17  Time: 9:44 AM    [facility]  Notes   provided a safe place for patient's daughter to tell their recent journey. As she began to open up  learned much more than what the patient had gone through. This  family has had many deaths. Patient is one of 10 siblings. She and only one other are left. Patient's  is . Patient has a daughter who is . In addition to the stress of patient's grand son in law has a elizabeth tumor.  offered support with compassion, empathy, listening, reflection and prayer.  also spoke to bereavement coordinator about the various grief issues this family has faced.  to continued to provide support throughout patient's stay at [facility].         Signed by: Santhosh Jain [address] [place] [address] Nurse Notes by Emanuel Rivers at 01/09/17 1442  Version 1 of 1    Author:  Emanuel Rivers Service:  Ledon Kocher Author Type:  Registered Nurse    Filed:  01/09/17 1445 Date of Service:  01/09/17 1442 Status:  Signed    :  Emanuel Rivers (Registered Nurse)           Patient: Myron Freeman    Date: 01/09/17  Time: 2:42 PM    [facility] Nurse Notes    UPDATE: patient not eating although taking sips at times; continues to see people in the room and asking for them to \"open the gate\"; continues to require PRN morphine injectables for breakthrough pain in addition to fentanyl 12mcg patch; receiving 3-4 doses of PRN haldol injectables daily; ongoing support for patient and family    Goals of care: effectiveness of symptom management continuously evaluated to achieve optimum comfort and ultimately a peaceful death        Signed by: Emanuel Rivers       [address] [place] [address]  Notes by Pushpa Willett at 01/09/17 1441  Version 1 of 1    Author:  Pushpa Willett Service:  Ledon Kocher Author Type:      Filed:  01/09/17 1444 Date of Service:  01/09/17 1441 Status:  Signed    :  Pushpa Willett ()           Patient: Myron Freeman    Date: 01/09/17  Time: 2:41 PM    [facility]  Notes  LMSW continues to offer emotional support. No community resources have been needed for the family or the pt. Pt continues to decline.          Signed by: Pushpa Willett       [facility] IDG Volunteer Notes by Jessica Rivera at 01/06/17 1301  Version 1 of 1    Author:  Jessica Rivera Service:  Ledon Kocher Author Type:  Hospice Volunteer/    Filed:  01/06/17 1301 Date of Service:  01/06/17 1301 Status:  Signed    :  Jessica Rivera (Hospice Volunteer/)               [address] Review     Status Codes I = Initiated C=Continued R=Revised RS = Resolved     I Volunteer     Goal: Hospice house volunteer (s) enhances the quality of remaining life while patient is at the hospice house. Interventions: Kate Estes Volunteer (s) will provide companionship to the patient and/or family by visiting at the hospice house       . Kate Estes Volunteer (s) will provide respite as needed when requested by patient and/or family. Kate Vasquez  Volunteer will provide activities such as music, reading, pet therapy, etc. as requested. Kate White Layman  Comfort bag delivered. Any other special requests or information regarding volunteer services: Three visits recorded for prayer and companionship. No further needs identified at this time. These notes have been discussed in [facility] meeting. [address] [place] [address] Nurse Notes by Alan Dunn at 01/06/17 1234  Version 1 of 1    Author:  Alan Dunn Service:  Josh Smallwood Author Type:  Registered Nurse    Filed:  01/06/17 1237 Date of Service:  01/06/17 1234 Status:  Signed    :  Alan Dunn (Registered Nurse)           Patient: Keila Sandoval    Date: 01/06/17  Time: 12:34 PM    [address] [place] [address] Nurse Notes    UPDATE: increased frequency of morphine required more for pain as opposed to dyspnea since the last IDG; fentanyl patch added and PRN morphine continues to be administered to manage pain and dyspnea; delirium presenting more requiring PRN haldol to manage    Goals of care: effectiveness of symptom management continuously evaluated to achieve optimum comfort      Signed by: Alan Dunn       [address] [facility]  Notes by Yulia Taylor at 01/06/17 1234  Version 1 of 1    Author:  Yulia Taylor Service:  Josh Smallwood Author Type:  Pastoral Care    Filed:  01/06/17 1235 Date of Service:  01/06/17 1234 Status:  Signed    :  Yulia Taylor (Pastoral Care)           Patient: Keila Sandoval    Date: 01/06/17  Time: 12:34 PM    Patient admitted on 1/2/17. Chaplain Angel Archuleta and Oliva Cole have both provided spiritual care for patient/family.   Se documentation below for  Lisbet's most recent report:     provided a safe place for patient's daughter to tell their recent journey. As she began to open up  learned much more than what the patient had gone through. I learned that the family has had many deaths. Patient is one of 10 siblings. She and only one other are left. Patient's  is . Patient has a daughter who is . In addition to the stress of patient's grand son in law has a elizabeth tumor. Their most resent turn of events has been the patient's decline. Patients daughter has done all that she knew possible to help her mom improve since she fell in October of last year. The patient went to rehab but while there continued the downward spiral. Daughter was in hopes of getting her mom to a spinal doctor. 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 Her organs began to be compromised and she continued to decline until it was determined she needed comfort rather than curative care. Family is at peace with patient being at Hospice. They are assured of her eternal destination. During this visit  was able to listen and affirm daughters wonderful care, affirm her clifford and offer prayer for patient and family's journey including the grand son in law who has a brain tumor.      Following the visit  updated our Bereavement Coordinator, ARIANA Valverde Div of the family's additional stressors.        Signed by: Francisco WILKINSONR [facility] IDG  Notes by Komal Almaraz at 17 1231  Version 1 of 1    Author:  Komal Almaraz Service:  Dina Pandey Author Type:      Filed:  17 1233 Date of Service:  17 1231 Status:  Signed    :  Komal Almaraz ()           Patient: Lu Rodriguez    Date: 17  Time: 12:31 PM    [facility]  Notes  Spoke to family about giving the \"LTC\" Bed  Up at [facility]. Family is ok with that. Daughter is feeling a little better with the decline of mom. Signed by: Roz Frank       [address] [place] [address]  Notes by Roz Frank at 01/03/17 1150  Version 1 of 1    Author:  Roz Frank Service:  Ayaka Prasad Author Type:      Filed:  01/03/17 1217 Date of Service:  01/03/17 1150 Status:  Signed    :  Roz Frank ()           Patient: Lori Ennis    Date: 01/03/17  Time: 11:50 AM    [facility]  Notes  LMSW will visit to complete the initial assessment. The family was curious if they needed to cancel the LTC bed at [facility]. LMSW advised the family to not give up a bed for long term just yet. The family will be calling the facility to check on the patient's belonging's.   She was getting rehab before going to the hospital.         Signed by: Roz Frank       [address] [place] [address] Nurse Notes by Mark Mcmahan at 01/03/17 1205  Version 1 of 1    Author:  Mark Mcmahan Service:  Ayaka Prasad Author Type:  Registered Nurse    Filed:  01/03/17 1211 Date of Service:  01/03/17 1205 Status:  Signed    :  Mark Mcmahan (Registered Nurse)           Patient: Lori Ennis    Date: 01/03/17  Time: 12:05 PM    [address] [place] Nurse Notes    1st IDG since GIP admission to [facility] yesterday; patient minimally responsive with the exception of yes/no questions and observations of talking with beings unable to be seen by staff about how she is \"ready to go to heaven\"; noted aspiration with oral intake despite thickened and crushed medications; patient also noted to be dyspneic with speech; receiving PRN haldol for agitation/delirium and PRN morphine for dyspnea - both alternating between crushed and injectables; SW assessment pending    Goals of care: effectiveness of symptom management continuously evaluated to achieve optimum comfort        Signed by: Mark Mcmahan       [address] [place] [address]  Notes by Clarita Monroy Zara Herron at 01/03/17 1038  Version 1 of 1    Author:  Morgan Mario Service:  Spiritual Care Author Type:  Pastoral Care    Filed:  01/03/17 1200 Date of Service:  01/03/17 1038 Status:  Signed    :  Morgan Mario (Pastoral Care)           Patient: Danielle Bustos    Date: 01/03/17  Time: 10:38 AM    [facility]  Notes    Spiritual Care assessment completed on January 2, 2017 by Nubia Mcgarry. Patient was initially at [facility] and when she became ill was hospitalized with pneumonia and sepsis. She is now GIP level of care. Patient is of R.RWolfgang Gallagher. During 's visit family shared a touching story of how patient led her daughter to Ryan Peter is very important to this family. Family expressed feelings of peace with decisions for patient to be at [facility].  provide support with active listening, compassion and prayer. Join [employer] to follow up. Signed by: Morgan Mario       [facility] IDG Volunteer Notes by Rafat Barber at 01/03/17 1124  Version 1 of 1    Author:  Rafat Barber Service:  Chucky Dewitt Author Type:  Hospice Volunteer/    Filed:  01/03/17 1124 Date of Service:  01/03/17 1124 Status:  Signed    :  Rafat Barber (Hospice Volunteer/)               [facility] Interdisciplinary Plan of Care Review     Status Codes I = Initiated C=Continued R=Revised RS = Resolved     I Volunteer     Goal: Hospice house volunteer (s) enhances the quality of remaining life while patient is at the hospice house. Interventions: Rayshawn Bullockerel Rayshawn Pickerel Mundo [place] Volunteer (s) will provide companionship to the patient and/or family by visiting at the hospice house       . Rayshawn Pickerel [place]undo [place] Volunteer (s) will provide respite as needed when requested by patient and/or family. Rayshawn Pickerel Sissy Bound  Volunteer will provide activities such as music, reading, pet therapy, etc. as requested. Rayshawn Pickerel Sissy Bound  Comfort bag delivered.         Any other special requests or information regarding volunteer services:     No further needs identified at this time. These notes have been discussed in [facility] meeting.

## 2017-02-17 NOTE — [facility] IDG VOLUNTEER NOTES
[address] Review     Status Codes I = Initiated C=Continued R=Revised RS = Resolved     C Volunteer     Goal: Hospice house volunteer (s) enhances the quality of remaining life while patient is at the hospice house. Interventions: Rayshawn Fallondmitriy Heredial Volunteer (s) will provide companionship to the patient and/or family by visiting at the hospice house       . Rayshawn Fallondmitriy Heredial Volunteer (s) will provide respite as needed when requested by patient and/or family. Rayshawn Petersl Sissy Bound  Volunteer will provide activities such as music, reading, pet therapy, etc. as requested. Rayshawn Mirmaontes Sissy Bound  Comfort bag delivered. Any other special requests or information regarding volunteer services:    Two pages of visits recorded for companionship, prayer, pet therapy. No further needs identified at this time. These notes have been discussed in [facility] meeting.

## 2017-02-17 NOTE — PROGRESS NOTES
Report received from off going RN. Patient round. Patient identified by name and . Patient responsive to pain only. No s/sx of pain. No distress noted. RR even and unlabored. Bed low and locked. Call bell within reach. Door open for continuous monitoring.

## 2017-02-19 NOTE — PROGRESS NOTES
Summary Note- Patient is lethargic. Required SQ PRN medication for pain. Mouth care only. Prater to gravity; no bowel movement this shift. Fentanyl patches x3 on right abdomen. Patient safety maintained through hourly rounding: bed low and locked, side rails x2, tab alerts on, call light within reach, and door open for continuous monitoring except when family is bedside.

## 2017-02-19 NOTE — PROGRESS NOTES
The patient has had a quite day. She required one dose of prn medications for anxiety and pain that resolved the problems. She has taken in no food or liquids. Report given to the on coming RN and walking rounds completed.

## 2017-02-19 NOTE — PROGRESS NOTES
Progress Note    Patient: Savannah Gil MRN: [id number]  SSN: [id number]    YOB: 1933  Age: 80 y.o. Sex: female      Admit Date: 1/2/2017    LOS: 48 days     Subjective:     Unresponsive. Dtr at bedside. Review of Systems:  Review of systems not obtained due to patient factors. Objective:     Vitals:    02/17/17 1646 02/18/17 0637 02/18/17 1555 02/19/17 0657   BP: 111/52 112/51 140/63 123/57   Pulse: 77 78 69 72   Resp: 11 15 15 17   Temp: 96.2 °F (35.7 °C) 96.6 °F (35.9 °C)  (!) 93.5 °F (34.2 °C)   Weight:       Height:            Intake and Output:  Current Shift:    Last three shifts:      Physical Exam:   GENERAL: no distress, appears stated age, moderately obese  LUNG: rhonchi coarse throughout all lung fields. Periods of apnea 20 seconds long. HEART: regular rate and rhythm, S1, S2 normal, no murmur, click, rub or gallop  ABDOMEN: soft, non-tender. Bowel sounds absent. No masses, no organomegaly  EXTREMITIES: Less edema to all extremities. + pedal pulses. SKIN: Warm to touch. NEUROLOGIC: Unresponsive. Lab/Data Review:  No new labs resulted in the last 24 hours.       Assessment:     Principal Problem:    Acute respiratory failure with hypoxia ([facility] [address] (1/2/2017)        Plan:     Current Facility-Administered Medications   Medication Dose Route Frequency    fentaNYL (DURAGESIC) 100 mcg/hr patch 2 Patch  2 Patch TransDERmal Q72H    And    fentaNYL (DURAGESIC) 50 mcg/hr patch 1 Patch  1 Patch TransDERmal Q72H    morphine injection 8 mg  8 mg SubCUTAneous Q30MIN PRN    LORazepam (ATIVAN) tablet 1 mg  1 mg SubLINGual Q4H PRN    LORazepam (ATIVAN) injection 1 mg  1 mg IntraMUSCular Q4H PRN    haloperidol lactate (HALDOL) injection 2 mg  2 mg SubCUTAneous Q1H PRN    diphenhydrAMINE (BENADRYL) injection 25 mg  25 mg IntraMUSCular Q6H PRN    acetaminophen (TYLENOL) suppository 650 mg  650 mg Rectal Q3H PRN    bisacodyl (DULCOLAX) suppository 10 mg  10 mg Rectal PRN    haloperidol lactate (HALDOL) injection 2 mg  2 mg SubCUTAneous Q1H PRN    albuterol-ipratropium (DUO-NEB) 2.5 MG-0.5 MG/3 ML  3 mL Nebulization Q4H PRN    glycopyrrolate (ROBINUL) injection 0.2 mg  0.2 mg SubCUTAneous Q4H PRN     1. Admitted with acute hypoxic respiratory failure for management of dyspnea, delirium, and family/pt support.      2. Dyspnea: Morphine as ordered. Glycopyrrolate PRN secretions. Duonebs PRN. Continue Fentanyl at 250.       3. Delirium: Haldol and Ativan as ordered PRN.     4. Family/Pt Support: Dtr at bedside during exam. Ongoing plan of care including medications discussed with primary RN and dtr. Continue to monitor and palliate symptoms as they arise. No medication changes required at this time. PPS 10%.      Signed By: Trino Lehman NP     February 19, 2017

## 2017-02-20 NOTE — [facility] IDG CHAPLAIN NOTES
Patient: Scott Duff    Date: 02/20/17  Time: 1:15 PM    [facility] [place]  Notes     continues to offer care to patient and her family. Her daughter Nathalie Smith continues to be at bedside. Davy Mcdonough continues to provide a caring presence, words of comfort and peace. Family coping well but daughter does appear to be wearing down at times. Nathalie Smith depends greatly on her clifford.         Signed by: Marcia Wong

## 2017-02-20 NOTE — [facility] IDG NURSE NOTES
Patient: Mounika Rey    Date: 02/20/17  Time: 1:20 PM    [facility] Nurse Notes    UPDATE: patient requiring less PRN injectable morphine since increase in fentanyl patch Friday; over the weekend, minimally responsive with periods of apnea noted; today, a bit more interactive although notably weaker and decrease urine output    Goals of care: continue current POC: effectiveness of symptom management continuously evaluated to achieve optimum comfort and ultimately a peaceful death        Signed by: Jeanne Cano

## 2017-02-20 NOTE — [facility] IDG SOCIAL WORKER NOTES
Patient: Mounika Rey    Date: 02/20/17  Time: 1:19 PM    \[facility] [place] [facility]\""  Notes  LMSW  to continue with emotional support. Emotional support for daughter Randall Tolbert. Pt has been up and down in decline.             Signed by: Mable Corral

## 2017-02-20 NOTE — [facility] IDG MASTER NOTE
[facility] Collabor[facility]  Date: 02/21/17  Time: 12:19 PM    ___________________    Patient: Dragan Cornejo    ___________________    Diagnoses:  Diagnoses of Neuralgia and neuritis and Spinal stenosis of lumbar region were pertinent to this visit. Current Medications:    Current Facility-Administered Medications:     fentaNYL (DURAGESIC) 100 mcg/hr patch 2 Patch, 2 Patch, TransDERmal, Q72H, 2 Patch at 02/20/17 1535 **AND** fentaNYL (DURAGESIC) 50 mcg/hr patch 1 Patch, 1 Patch, TransDERmal, Q72H, Florecita Price NP, 1 Patch at 02/20/17 1542    morphine injection 8 mg, 8 mg, SubCUTAneous, Q30MIN PRN, 8 mg at 02/21/17 0244 **OR** [DISCONTINUED] morphine injection 2 mg, 2 mg, IntraVENous, Q20MIN PRN, Karla Nathanes, NP    LORazepam (ATIVAN) tablet 1 mg, 1 mg, SubLINGual, Q4H PRN, Karla Dimes, NP, 1 mg at 02/01/17 0402    LORazepam (ATIVAN) injection 1 mg, 1 mg, IntraMUSCular, Q4H PRN, Karla Dimes, NP, 1 mg at 02/18/17 0836    haloperidol lactate (HALDOL) injection 2 mg, 2 mg, SubCUTAneous, Q1H PRN, Karla Dimes, NP, 2 mg at 02/16/17 0958    diphenhydrAMINE (BENADRYL) injection 25 mg, 25 mg, IntraMUSCular, Q6H PRN, Karla Dimes, NP    acetaminophen (TYLENOL) suppository 650 mg, 650 mg, Rectal, Q3H PRN, Karla Dimes, NP    bisacodyl (DULCOLAX) suppository 10 mg, 10 mg, Rectal, PRN, Karla Dimes, NP, 10 mg at 02/03/17 1055    haloperidol lactate (HALDOL) injection 2 mg, 2 mg, SubCUTAneous, Q1H PRN, Karla Dimes, NP, 2 mg at 02/15/17 2224    albuterol-ipratropium (DUO-NEB) 2.5 MG-0.5 MG/3 ML, 3 mL, Nebulization, Q4H PRN, Karla Hernandez NP    glycopyrrolate (ROBINUL) injection 0.2 mg, 0.2 mg, SubCUTAneous, Q4H PRN, Karla Hernandez NP, 0.2 mg at 02/21/17 0243    Orders: Allergies:   Allergies   Allergen Reactions    Lortab [Hydrocodone-Acetaminophen] Unknown (comments) and Nausea and Vomiting       ___________________    Care Team Notes          POC/IDG Notes [address] [place] [address] Nurse Notes by Cornel Hardy at 02/20/17 1320  Version 1 of 1    Author:  Cornel Hardy Service:  Steff Vincent Author Type:  Registered Nurse    Filed:  02/20/17 1322 Date of Service:  02/20/17 1320 Status:  Signed    :  Cornel Hardy (Registered Nurse)           Patient: Angelito Gomez    Date: 02/20/17  Time: 1:20 PM    [address] [place] [address] Nurse Notes    UPDATE: patient requiring less PRN injectable morphine since increase in fentanyl patch Friday; over the weekend, minimally responsive with periods of apnea noted; today, a bit more interactive although notably weaker and decrease urine output    Goals of care: continue current POC: effectiveness of symptom management continuously evaluated to achieve optimum comfort and ultimately a peaceful death        Signed by: Cornel Hardy       [address] [place] [address]  Notes by Justine Lim at 02/20/17 1315  Version 1 of 1    Author:  Justine Lim Service:  Spiritual Care Author Type:  Pastoral Care    Filed:  02/20/17 1322 Date of Service:  02/20/17 1315 Status:  Signed    :  Justine Lim (Pastoral Care)           Patient: Angelito Gomez    Date: 02/20/17  Time: 1:15 PM    [facility]  Notes     continues to offer care to patient and her family. Her daughter Ayush Mar continues to be at bedside. Andre Stack continues to provide a caring presence, words of comfort and peace. Family coping well but daughter does appear to be wearing down at times. Ayush Mar depends greatly on her clifford. Signed by: Justine Lim       [address] [place] [address]  Notes by Odilon Epstein at 02/20/17 1319  Version 1 of 1    Author:  Odilon Epstein Service:  Steff Vincent Author Type:      Filed:  02/20/17 1322 Date of Service:  02/20/17 1319 Status:  Signed    :  Odilon Epstein ()           Patient: Angelito Gomez    Date: 02/20/17  Time: 1:19 PM    [facility]  Notes  LMSW  to continue with emotional support.   Emotional support for daughter Shayne Gallo. Pt has been up and down in decline. Signed by: Melisa Guillen       [facility] IDG Nurse Notes by Kayla Carlson at 02/17/17 1512  Version 1 of 1    Author:  Kayla Carlson Service:  Ignacio Farah Author Type:  Registered Nurse    Filed:  02/17/17 1516 Date of Service:  02/17/17 1512 Status:  Signed    :  Kayla Carlson (Registered Nurse)           Patient: Mark Moore    Date: 02/17/17  Time: 3:12 PM    [facility] Nurse Notes    UPDATE: fentanyl increased to 200 mcg two days ago r/t frequency of breakthrough pain medications; continues to require frequent PRN injectable morphine for breakthrough pain; PRN haldol injectables for delirium; extensive emotional support provided for patient's daughter, Shayne Gallo, r/t the death of another long-term patient she had gotten close with    Goals of care:  increase fentanyl to 250 mcg; encourage use of synergistic effects of combination of haldol with PRN morphine; effectiveness of symptom management continuously evaluated to achieve optimum comfort and ultimately a peaceful death        Signed by: Kayla Carlson       [facility] IDG  Notes by Santhosh Jain at 02/16/17 1836  Version 1 of 1    Author:  Santhosh Jain Service:  Spiritual Care Author Type:  Pastoral Care    Filed:  02/17/17 1515 Date of Service:  02/16/17 1836 Status:  Signed    :  Santhosh Jain (Pastoral Care)           Patient: Mark Moore    Date: 02/16/17  Time: 6:36 PM    [facility]  Notes    Continued support through words of encouragement to family and meaningful conversation. Patient appears to be declining and sleeping most of the time. Sometimes it appears she is sleeping with her eyes open. Her daughter Shayne Gallo is tearful but leans on her clifford. She is so appreciative of [employer]. Patient's granddaughter who recently lost her  has begun visiting her grandmother and spending time with mom. Continued support to be provided while patient is with Johnathan Casiano. Signed by: Sim Goodness       [facility] IDG  Notes by Pushpa Willett at 02/17/17 1505  Version 1 of 1    Author:  Pushpa Willett Service:  Ledon Kocher Author Type:      Filed:  02/17/17 1513 Date of Service:  02/17/17 1505 Status:  Signed    :  Pushpa Willett ()           Patient: Myron Freeman    Date: 02/17/17  Time: 3:05 PM    [facility]  Notes  LMSW to continue with support for pt and daughter and family. Other family was in the room today and I stopped in and they were giving her water by the spoonful. LMSW asked if they were giving her thickened water. The water was not thickened. Signed by: Pushpa Willett       [facility] IDG Volunteer Notes by Jessica Rivera at 02/17/17 1400  Version 1 of 1    Author:  Jessica Rivera Service:  Ledon Kocher Author Type:  Hospice Volunteer/    Filed:  02/17/17 1400 Date of Service:  02/17/17 1400 Status:  Signed    :  Jessica Rivera (Hospice Volunteer/)               [address] [facility] Interdisciplinary Plan of Care Review     Status Codes I = Initiated C=Continued R=Revised RS = Resolved     C Volunteer     Goal: Hospice house volunteer (s) enhances the quality of remaining life while patient is at the hospice house. Interventions: Anali Patel Volunteer (s) will provide companionship to the patient and/or family by visiting at the hospice house       . Anali Patel Volunteer (s) will provide respite as needed when requested by patient and/or family. Anali Alva  Volunteer will provide activities such as music, reading, pet therapy, etc. as requested. Anali Alva  Comfort bag delivered. Any other special requests or information regarding volunteer services:    Two pages of visits recorded for companionship, prayer, pet therapy. No further needs identified at this time. These notes have been discussed in [facility] meeting.        [facility] IDG Nurse Notes by Sujey Benavidez at 02/13/17 1258  Version 1 of 1    Author:  Sujey Benavidez Service:  Caro Woods Author Type:  Registered Nurse    Filed:  02/13/17 1300 Date of Service:  02/13/17 1258 Status:  Signed    :  Sujey Benavidez (Registered Nurse)           Patient: Malick Triana    Date: 02/13/17  Time: 12:58 PM    [address] [place] [address] Nurse Notes    UPDATE: patient continues with current POC; continues to receive PRN injectable morphine for pain in addition to 150 mcg fentanyl patch and haldol injectables for agitation; ongoing family support     Goals of care: effectiveness of symptom management continuously evaluated to achieve optimum comfort and ultimately a peaceful death        Signed by: Sujey Benavidez       [address] [place] [address]  Notes by Mariaa Mullins at 02/13/17 1252  Version 1 of 1    Author:  Mariaa Mullins Service:  Spiritual Care Author Type:  Pastoral Care    Filed:  02/13/17 1258 Date of Service:  02/13/17 1252 Status:  Signed    :  Mariaa Mullins (Pastoral Care)           Patient: Malick Triana    Date: 02/13/17  Time: 12:52 PM    [facility]  Notes     continues provided routine visits for patient and family. Daughter expressed appreciation for all the love and support. Signed by: Mariaa Mullins       [address] [place] [address]  Notes by Constantino Mc at 02/13/17 1247  Version 1 of 1    Author:  Constantino Mc Service:  Caro Woods Author Type:      Filed:  02/13/17 1248 Date of Service:  02/13/17 1247 Status:  Signed    :  Constantino Mc ()           Patient: Malick Triana    Date: 02/13/17  Time: 12:47 PM    [facility]  Notes  LMSW visited this morning to provide emotional support to Emma Crain and the pt. No changes to the plan of care.           Signed by: Constantino Mc       [facility] Nurse Notes by Sujey Benavidez at 02/10/17 1303  Version 1 of 1    Author:  Sujey Benavidez Service:  Caro Woods Author Type:  Registered Nurse Filed:  02/10/17 1306 Date of Service:  02/10/17 1303 Status:  Signed    :  Gaetano Toribio (Registered Nurse)           Patient: Hilda Abrams    Date: 02/10/17  Time: 1:03 PM    [address] [place] [address] Nurse Notes    UPDATE: patient mostly unresponsive with intermittent periods of light arousal noted; fentanyl patch remains at 150 mcg and PRN morphine injectables used for breakthrough pain; despite ongoing skin breakdown r/t extensive time without oral intake - will hold off on changing to air mattress for patient comfort and ongoing decline; continues to receive PRN injectable haldol for agitation and delirium; ongoing family support    Goals of care: effectiveness of symptom management continuously evaluated to achieve optimum comfort and ultimately a peaceful death        Signed by: Gaetano Toribio       [address] [place] [address]  Notes by Ria Wetzel at 02/10/17 1302  Version 1 of 1    Author:  Ria Wetzel Service:  Jon Hurtado Author Type:      Filed:  02/10/17 1306 Date of Service:  02/10/17 1302 Status:  Signed    :  Ria Wetzel ()           Patient: Hilda Abrams    Date: 02/10/17  Time: 1:02 PM    hospitals  Notes  LMSW continue to provide emotional support and active listening. Pt always has family in the room with her. They are very supportive. Signed by: Ria Wetzel       [facility]  Notes by Curt Valdez at 02/09/17 1658  Version 1 of 1    Author:  Curt Valdez Service:  Spiritual Care Author Type:  Pastoral Care    Filed:  02/10/17 1305 Date of Service:  02/09/17 1658 Status:  Signed    :  Curt Valdez (Pastoral Care)           Patient: Hilda Abrams    Date: 02/09/17  Time: 4:58 PM    hospitals  Notes     has provided care for patient and family throughout patient's stay with [facility].  has offered emotional support, opportunity for open communication, grief care and prayer.   Family is coping very well considering the long stay at [facility] and the recent death in their family. Patient's granddaughter came to visit this week. ( the one whose  }   offered support to her this week as well through active listening, compassion and joining with volunteers in prayer for her. Signed by: Luis MONET [facility] IDG Volunteer Notes by Kvng Sun at 02/10/17 1235  Version 1 of 1    Author:  Kvng Sun Service:  Tony Chong Author Type:  Hospice Volunteer/    Filed:  02/10/17 1236 Date of Service:  02/10/17 1235 Status:  Signed    :  Kvng Sun (Hospice Volunteer/)               [address] [facility] Interdisciplinary Plan of Care Review     Status Codes I = Initiated C=Continued R=Revised RS = Resolved     C. Volunteer     Goal: Hospice house volunteer (s) enhances the quality of remaining life while patient is at the hospice house. Interventions: [facility] Volunteer (s) will provide companionship to the patient and/or family by visiting at the hospice house       . [facility] Elyssa [facility] Volunteer (s) will provide respite as needed when requested by patient and/or family. [facility] Rebecca [facility]  Volunteer will provide activities such as music, reading, pet therapy, etc. as requested. [facility] Whats[employer]exxhany Opal Labster  Comfort bag delivered. Any other special requests or information regarding volunteer services:    Multiple visits are recorded for pet therapy, companionship, prayer, and volunteer nursing assistance. No further needs identified at this time. These notes have been discussed in 888 [facility] meeting.         Signed by: Roni Nogueira IDG Nurse Notes by Derrick Mcintosh at 17 1322  Version 1 of 1    Author:  Derrick Mcintosh Service:  Tony Chong Author Type:  Registered Nurse    Filed:  17 1327 Date of Service:  17 Status:  Signed    :  Derrick Mcintosh (Registered Nurse)           Patient: Yefri Andrews    Date: 02/06/17  Time: 1:22 PM    [facility] Nurse Notes    UPDATE: patient continues to require PRN injectables for pain [morphine] and agitation [haldol] over the weekend despite increase of fentanyl patch to 150 mcg; ongoing social/emotional support for family    Goals of care: increase PRN morphine dose to 8 mg every 30 minutes; effectiveness of symptom management continuously evaluated to achieve optimum comfort and ultimately a peaceful death         Signed by: Sid Norwood       [facility] IDG  Notes by Lars Brewer at 02/06/17 1321  Version 1 of 1    Author:  Lars Brewer Service:  Spiritual Care Author Type:  Pastoral Care    Filed:  02/06/17 1324 Date of Service:  02/06/17 1321 Status:  Signed    :  Lars Brewer (Pastoral Care)           Patient: No Berg    Date: 02/06/17  Time: 1:21 PM    [facility]  Notes     continues to provide care for patient and her family. Leelee Sullivan has recently  focused on grief support for daughter and son in law  given the recent family death. Daughter has been tearful at times but is able to compose herself quickly. Signed by: Lars Brewer       [facility] IDG  Notes by Shama Mcgrath at 02/06/17 1320  Version 1 of 1    Author:  Shama Mcgrath Service:  Kvng Ferreira Author Type:      Filed:  02/06/17 1322 Date of Service:  02/06/17 1320 Status:  Signed    :  Shama Mcgrath ()           Patient: No Berg    Date: 02/06/17  Time: 1:20 PM    [facility]  Notes    LMSW to continue to provide emotional support for the daughter.        Signed by: Shama Mcgrath       [facility] IDG  Notes by Lars Brewer at 02/03/17 1030  Version 1 of 1    Author:  Lars Brewer Service:  Spiritual Care Author Type:  Pastoral Care    Filed:  02/03/17 1308 Date of Service:  02/03/17 1030 Status:  Signed    :  Lars Brewer (Pastoral Care)           Patient: No Berg    Date: 17  Time: 10:30 AM    [facility]  Notes  Difficult week for the family. Patient's grand son in law  this week. Patient's daughter Saran Ruiz has been more emotional throughout this time. She has gone today to attend the  while volunteers sit with her mom.  spoke to her as she left this morning offering a hug and prayer. She thanked  for being there for her as tears rolled down her cheeks. Patient appears to be declining. Patient's ability to interact is much more limited. Signed by: Rosina Bruner       [facility] IDG Nurse Notes by Kaylah Goldsmith at 17 1303  Version 1 of 1    Author:  Kaylah Goldsmith Service:  Rene Bettencourt Author Type:  Registered Nurse    Filed:  17 1307 Date of Service:  17 1303 Status:  Signed    :  Kaylah Goldsmith (Registered Nurse)           Patient: Lars Tucker    Date: 17  Time: 1:03 PM    [facility] Nurse Notes    UPDATE: patient continues on fentanyl patch 125 mcg and requiring frequent PRN morphine injectables for breakthrough pain; continues to receive PRN injectable haldol for agitation and lorazepam for anxiety; ongoing support for family     Goals of care: increase fentanyl patch to 150 mcg for better long-acting pain medication; effectiveness of symptom management continuously evaluated to achieve optimum comfort            Signed by: Kaylah Goldsmith       [facility] IDG  Notes by Missy Cortes at 17 1302  Version 1 of 1    Author:  Missy Cortes Service:  Rene Bettencourt Author Type:      Filed:  17 1306 Date of Service:  17 1302 Status:  Signed    :  Missy Cortes ()           Patient: Lars Tucker    Date: 17  Time: 1:02 PM    [facility]  Notes  LMSW to continue to provide emotional support to pt and Saran Ruiz. Yajaira's son in [facility] was today. We had a volunteer sit with her while Saran Ruiz is gone. [facility] has been involved. Signed by: Magda Flower       \[facility] [place] [facility]\"" IDG Volunteer Notes by Melba Gates at 02/03/17 1155  Version 1 of 1    Author:  Melba Gates Service:  Claire Malik Author Type:  Hospice Volunteer/    Filed:  02/03/17 1156 Date of Service:  02/03/17 1155 Status:  Signed    :  Melba Gates (Hospice Volunteer/)               [facility] Interdisciplinary Plan of Care Review     Status Codes I = Initiated C=Continued R=Revised RS = Resolved     C. Volunteer     Goal: Hospice house volunteer (s) enhances the quality of remaining life while patient is at the hospice house. Interventions: Madina Dc Madina Rm Roberto Duenas Volunteer (s) will provide companionship to the patient and/or family by visiting at the hospice house       . [place] Dao Duenas Volunteer (s) will provide respite as needed when requested by patient and/or family. Madina Chapman  Volunteer will provide activities such as music, reading, pet therapy, etc. as requested. Madina Bowmans  Comfort bag delivered. Any other special requests or information regarding volunteer services:     Multiple visits for prayer, companionship, pet therapy, and volunteer nursing assistance are recorded. No further needs identified at this time. These notes have been discussed in 888 [facility] meeting.         Signed by: Melba Gates       [address] [place] [address] Nurse Notes by Peggy Mcnamara at 01/30/17 1252  Version 1 of 1    Author:  Peggy Mcnamara Service:  Claire Malik Author Type:  Registered Nurse    Filed:  01/30/17 1256 Date of Service:  01/30/17 1252 Status:  Signed    :  Peggy Mcnamara (Registered Nurse)           Patient: Kevin Garcia    Date: 01/30/17  Time: 12:52 PM    \[facility]\"" Nurse Notes    UPDATE: patient remains on fentanyl patch 125 mcg and receiving PRN morphine injectables to manage breakthrough pain; minimal fluid intake; urine output dropped over the weekend and only 200 past 24 hours; newly febrile and an increase in agitation overnight requiring PRN injectable haldol; ongoing emotional support to family    Goals of care: effectiveness of symptom management continuously evaluated to achieve optimum symptom management        Signed by: Walter Moreland       [facility] IDG  Notes by Damon Fang at 01/30/17 1250  Version 1 of 1    Author:  Damon Fang Service:  Spiritual Care Author Type:  Pastoral Care    Filed:  01/30/17 1254 Date of Service:  01/30/17 1250 Status:  Signed    :  Damon Fang (Pastoral Care)           Patient: Fredi Rosa    Date: 01/30/17  Time: 12:50 PM    [facility]  Notes     continues to be available for patient and family. Since last visit  has spoken to daughter on a couple of occassions outside the room. Family is at peace with patient's impending death. Yajaira's son in law has cancer and is under hospice care. The family appears to bee dealing well with all the stress. Signed by: Damon Fang       [facility] IDG  Notes by Paul Massey at 01/30/17 1247  Version 1 of 1    Author:  Paul Massey Service:  Sampson Jesus Author Type:      Filed:  01/30/17 1254 Date of Service:  01/30/17 1247 Status:  Signed    :  Paul Massey ()           Patient: Fredi Rosa    Date: 01/30/17  Time: 12:47 PM    [facility]  Notes  Today,  emotional support was provided to daughter. Pt continues to decline. Family is surprised at how long she has survived without food. Family states she has not had any food in 2 weeks.           Signed by: Paul Massey       [facility] IDG  Notes by Paul Massey at 01/27/17 1234  Version 1 of 1    Author:  Paul Massey Service:  Sampson Jesus Author Type:      Filed:  01/27/17 1238 Date of Service:  01/27/17 1234 Status:  Signed    :  Paul Massey ()           Patient: Fredi Rosa    Date: 01/27/17  Time: 12:34 PM    [facility]  Notes  LMSW to continue with support to the Abby Page the daughter. She does not leave her bedside very often. Pt was awake today. Pt is still showing signs of agitation and pain. Signed by: Yajaira Villa       [facility] IDG  Notes by Zakia Garcia at 01/27/17 1230  Version 1 of 1    Author:  Zakia Garcia Service:  Spiritual Care Author Type:  Pastoral Care    Filed:  01/27/17 1238 Date of Service:  01/27/17 1230 Status:  Signed    :  Zakia Garcia (Pastoral Care)           Patient: Melissa Edmonds    Date: 01/27/17  Time: 12:30 PM    [facility]  Notes    's last encounter with the was in the cafeteria. Daughter and son in law continue to depend on God to give them strength for the journey. They are at peace with mom's impending death. No new spiritual issues. Signed by: Zakia WILKINSONFINN [facility] IDG Volunteer Notes by Franki Gottlieb at 01/27/17 1151  Version 1 of 1    Author:  Franki Gottlieb Service:  Pallavi Mejia Author Type:  Hospice Volunteer/    Filed:  01/27/17 1152 Date of Service:  01/27/17 1151 Status:  Signed    :  Franki Gottlieb (Hospice Volunteer/)               [address] [place] [address] Interdisciplinary Plan of Care Review     Status Codes I = Initiated C=Continued R=Revised RS = Resolved     C Volunteer     Goal: Hospice house volunteer (s) enhances the quality of remaining life while patient is at the hospice house. Interventions: Salma Skelton Volunteer (s) will provide companionship to the patient and/or family by visiting at the hospice house       . Salma Skelton Volunteer (s) will provide respite as needed when requested by patient and/or family. Salma Maravilla  Volunteer will provide activities such as music, reading, pet therapy, etc. as requested. Salma Maravilla  Comfort bag delivered.         Any other special requests or information regarding volunteer services:    13 visits recorded for prayer, pet therapy, companionship. No further needs identified at this time. These notes have been discussed in 888 [facility] meeting. [facility] IDG  Notes by Angeles Melgar at 01/23/17 1113  Version 1 of 1    Author:  Angeles Melgar Service:  Spiritual Care Author Type:  Pastoral Care    Filed:  01/23/17 1333 Date of Service:  01/23/17 1113 Status:  Signed    :  Angeles Melgar (Pastoral Care)           Patient: Ana Tesfaye    Date: 01/23/17  Time: 11:13 AM    [facility]  Notes    Patient and family care being provided. Patient and family are at peace with patient's impending death.  to offer emotional and spiritual support. Family is involved in a local [employer] and  is supportive. Patient's grandson in law continues to struggle with cancer. Signed by: Angeles Melgar       [facility] IDG  Notes by Reese Colmenares at 01/23/17 1330  Version 1 of 1    Author:  Reees Colmenares Service:  J Luis Hurtado Author Type:      Filed:  01/23/17 1333 Date of Service:  01/23/17 1330 Status:  Signed    :  Reese Colmenares ()           Patient: Ana Tesfaye    Date: 01/23/17  Time: 1:30 PM    [facility]  Notes  LMSW will continue to visit with the family to provide emotional support. Signed by: Reese Colmenares       [facility] IDG Volunteer Notes by Fernando Mcfarland at 01/20/17 1443  Version 1 of 1    Author:  Fernando Mcfarland Service:  J Luis Hurtado Author Type:  Hospice Volunteer/    Filed:  01/20/17 1445 Date of Service:  01/20/17 1443 Status:  Signed    :  Fernando Mcfarland (Hospice Volunteer/)               [facility] Interdisciplinary Plan of Care Review     Status Codes I = Initiated C=Continued R=Revised RS = Resolved     C. Volunteer     Goal: Hospice house volunteer (s) enhances the quality of remaining life while patient is at the hospice house. Interventions: Galileo Benitez Volunteer (s) will provide companionship to the patient and/or family by visiting at the hospice house       . Galileo Benitez Volunteer (s) will provide respite as needed when requested by patient and/or family. Galileo Marsh  Volunteer will provide activities such as music, reading, pet therapy, etc. as requested. Galileo Marsh  Comfort bag delivered. Any other special requests or information regarding volunteer services:    Seven visits are recorded for prayer, companionship, and volunteer nursing assistance. Comfort bag delivered. Extra visits are requested for companionship. No further needs identified at this time. These notes have been discussed in 8 [facility] meeting. Signed by: Maria Luisa MONET [facility] IDG  Notes by Dusty Becerra at 01/20/17 1412  Version 1 of 1    Author:  Dusty Becerra Service:  Brittnee Pichardo Author Type:      Filed:  01/20/17 1414 Date of Service:  01/20/17 1412 Status:  Signed    :  Dusty Becerra ()           Patient: Dragan Cornejo    Date: 01/20/17  Time: 2:12 PM    [facility]  Notes  LMSW to continue to provide emotional support for the patient and her daughter. [address] son in law has a brain tumor and he is on hospice as well. Signed by: Dusty Becerra       [facility] IDG  Notes by Gabrielle Negrete at 01/19/17 1618  Version 1 of 1    Author:  Gabrielle Negrete Service:  Spiritual Care Author Type:  Pastoral Care    Filed:  01/20/17 1414 Date of Service:  01/19/17 1618 Status:  Signed    :  Gabrielle Negrete (Pastoral Care)           Patient: Dragan Cornejo    Date: 01/19/17  Time: 4:18 PM    [facility]  Notes     continues to provide spiritual and emotional support for patient and family. During my last visit with the patient she was able to talk, although her voice was much weaker.   Patient's daughter came to 's office 1/8/17 and was able to express her feelings. She leans heavily on her clifford as her main source of strength. It is difficult having mom and son in law very sick at the same time. . She has told her daughter to stay at home with her  who is on hospice and take care of him. He has been able to visit patient once since she has been here but most of the time he is confined to home. Pennelope Earing will continue to provide support throughout patient's stay at [facility]. Signed by: Curt Valdez       [facility] IDG  Notes by Curt Valdez at 01/16/17 1407  Version 1 of 1    Author:  Curt Valdez Service:  Spiritual Care Author Type:  Pastoral Care    Filed:  01/16/17 1412 Date of Service:  01/16/17 1407 Status:  Signed    :  Curt Valdez (Pastoral Care)           Patient: Hilda Abrams    Date: 01/16/17  Time: 2:07 PM    [facility]  Notes     continues to be available for patient and family. Patient's daughter continues to be at the bedside faithfully. Her  gives her a break at times. Family feels mom is ready to go   Be with God. Daughter is at peace with mom's impending death. Patient waxes and wanes. During last encounter patient was awake and communicating.        Signed by: Curt Valdez       [facility] IDG Nurse Notes by Gaetano Toribio at 01/16/17 1408  Version 1 of 1    Author:  Gaetano Toribio Service:  Jon Hurtado Author Type:  Registered Nurse    Filed:  01/16/17 1411 Date of Service:  01/16/17 1408 Status:  Signed    :  Gaetano Toribio (Registered Nurse)           Patient: Hilda Abrams    Date: 01/16/17  Time: 2:08 PM    [facility] Nurse Notes    UPDATE: fentanyl patch increased to 37.5 mcg over the weekend related to increased use of PRN medications; despite change, continues to receive a number of PRN injectable morphine doses to manage pain; continues receiving PRN injectable haldol for agitation and nausea - last episode of vomiting this morning; now receiving PRN glyco for secretions; ongoing family support     Goals of care: d/c roxanol; titrate fentanyl patch to 50 mcg to better manage pain with goal to minimize PRN injections; effectiveness of symptom management continuously evaluated to achieve optimum comfort and ultimately a peaceful death      Signed by: Cherise Victorai       [address] [place] [address]  Notes by Eliel Keith at 01/16/17 1407  Version 1 of 1    Author:  Eliel Keith Service:  Rajni Jesus Author Type:      Filed:  01/16/17 1409 Date of Service:  01/16/17 1407 Status:  Signed    :  Eliel Keith ()           Patient: Sameer Parsons    Date: 01/16/17  Time: 2:07 PM    [address] [place] [address]  Notes  Daughter Kaci Bedoya at bedside. She vomited this am.  Family is always at bedside. LMSW continues to visit to provide emotional support, active listening and reassurance. Signed by: Eliel Keith       [address] [place] [address]  Notes by Eliel Keith at 01/13/17 1229  Version 1 of 1    Author:  Eliel Keith Service:  Rajni Jesus Author Type:      Filed:  01/13/17 1235 Date of Service:  01/13/17 1229 Status:  Signed    :  Eliel Keith ()           Patient: Sameer Parsons    Date: 01/13/17  Time: 12:29 PM    [facility]  Notes  LMSW will continue to provide emotional support. Pt was alert and oriented yesterday. Today she is barely opening her eyes. Family is usually at bedside. No financial concerns for family around final arrangements. Extra support to the daughter.         Signed by: Eliel Keith       [facility] by Cherise Victoria at 01/13/17 1230  Version 1 of 1    Author:  Cherise Victoria Service:  Rajni Jesus Author Type:  Registered Nurse    Filed:  01/13/17 5715 Date of Service:  01/13/17 1230 Status:  Signed    :  Cherise Victoria (Registered Nurse)           Patient: Sameer Parsons    Date: 01/13/17  Time: 12:30 PM    [facility] Nurse Notes    UPDATE: fentanyl patch increased to 25 mcg yesterday; since increase in patch, patient has required less but still requiring PRN morphine injectables for pain; continues to only take bites and sips; significantly less responsive overall than earlier this week; BP down into the 60's this morning; continues to receive PRN haldol injectables for agitation    Goals of care: will discontinue oral medications at this time; effectiveness of symptom management continuously evaluated to achieve optimum comfort and ultimately a peaceful death        Signed by: Marcos Graham       [facility]  Notes by José Armenta at 01/12/17 1641  Version 1 of 1    Author:  José Armenta Service:  Spiritual Care Author Type:  Pastoral Care    Filed:  01/13/17 1233 Date of Service:  01/12/17 1641 Status:  Signed    :  José Armenta (Pastoral Care)           Patient: Savannah Gil    Date: 01/12/17  Time: 4:41 PM    [facility]  Notes     was quite surprised with my last encounter on 11/12/17. Patient was alert and quite able to communicate. She was oriented to herself, family and surroundings. She is grateful to be awake. She says the pain medicine causes her to sleep but she knows she needs that sleep. Visit was interrupted by LORENZO Mercer. Ruslan wanted  to keep talking while she examined her but  felt it was better to return at a later date.  will continue to be available for support to patient and family during patient's time at [facility]   According to [place], NP patient has changed today and is very sleepy again with little response.          Signed by: José Armenta       [facility] IDG Volunteer Notes by Duncan Tesfaye at 01/13/17 1157  Version 1 of 1    Author:  Duncna Tesfaye Service:  Baldo Moran Author Type:  Hospice Volunteer/    Filed:  01/13/17 1158 Date of Service:  01/13/17 1157 Status:  Signed    :  Duncan Tesfaye [facility] Volunteer/) [address] Review     Status Codes I = Initiated C=Continued R=Revised RS = Resolved     C Volunteer     Goal: Hospice house volunteer (s) enhances the quality of remaining life while patient is at the hospice house. Interventions: Katherene Means Katherene Means Clerance Sprinkles Volunteer (s) will provide companionship to the patient and/or family by visiting at the hospice house       . Katherene Means Clerance Sprinkles Volunteer (s) will provide respite as needed when requested by patient and/or family. Katherene Means Bharath Fetch  Volunteer will provide activities such as music, reading, pet therapy, etc. as requested. Katherene Means Bharath Fetch  Comfort bag delivered. Any other special requests or information regarding volunteer services:   Six visits recorded for companionship, prayer, visits with family. Daughter enjoys company, per team. Volunteers notified. No further needs identified at this time. These notes have been discussed in 888 [facility] meeting. [facility]  Notes by Vani Guerrero at 17 9614  Version 1 of 1    Author:  Vani Guerrero Service:  Spiritual Care Author Type:  Pastoral Care    Filed:  17 1447 Date of Service:  17 0944 Status:  Signed    :  Vani Guerrero (Pastoral Care)           Patient: Coretta Peres    Date: 17  Time: 9:44 AM    \[facility]\""  Notes   provided a safe place for patient's daughter to tell their recent journey. As she began to open up  learned much more than what the patient had gone through. This  family has had many deaths. Patient is one of 10 siblings. She and only one other are left. Patient's  is . Patient has a daughter who is . In addition to the stress of patient's grand son in law has a elizabeth tumor.  offered support with compassion, empathy, listening, reflection and prayer.  also spoke to bereavement coordinator about the various grief issues this family has faced.    to continued to provide support throughout patient's stay at [facility]. Signed by: Yoli Griffinchester       [address] [place] ID Nurse Notes by Sebastian Arboleda at 01/09/17 1442  Version 1 of 1    Author:  Sebastian Arboleda Service:  Tashia Jacobson Author Type:  Registered Nurse    Filed:  01/09/17 1445 Date of Service:  01/09/17 1442 Status:  Signed    :  Sebastian Arboleda (Registered Nurse)           Patient: Ira Saldivar    Date: 01/09/17  Time: 2:42 PM    [facility] Nurse Notes    UPDATE: patient not eating although taking sips at times; continues to see people in the room and asking for them to \"open the gate\"; continues to require PRN morphine injectables for breakthrough pain in addition to fentanyl 12mcg patch; receiving 3-4 doses of PRN haldol injectables daily; ongoing support for patient and family    Goals of care: effectiveness of symptom management continuously evaluated to achieve optimum comfort and ultimately a peaceful death        Signed by: Sebastian Arboleda       [address] [place] [address]  Notes by Simona Hurtado at 01/09/17 1441  Version 1 of 1    Author:  Simona Hurtado Service:  Tashia Jacobson Author Type:      Filed:  01/09/17 1444 Date of Service:  01/09/17 1441 Status:  Signed    :  Simona Hurtado ()           Patient: Ira Saldivar    Date: 01/09/17  Time: 2:41 PM    [facility]  Notes  LMSW continues to offer emotional support. No community resources have been needed for the family or the pt. Pt continues to decline.          Signed by: Simona Hurtado       [facility] IDG Volunteer Notes by Danny Parker at 01/06/17 1301  Version 1 of 1    Author:  Danny Parker Service:  Tashia Jacobson Author Type:  Hospice Volunteer/    Filed:  01/06/17 1301 Date of Service:  01/06/17 1301 Status:  Signed    :  Danny Parker (Hospice Volunteer/)               [address] Care Review     Status Codes I = Initiated C=Continued R=Revised RS = Resolved     I Volunteer     Goal: Hospice house volunteer (s) enhances the quality of remaining life while patient is at the hospice house. Interventions: Anali Patel Volunteer (s) will provide companionship to the patient and/or family by visiting at the hospice house       . Anali Patel Volunteer (s) will provide respite as needed when requested by patient and/or family. Anali Alva  Volunteer will provide activities such as music, reading, pet therapy, etc. as requested. Anali Alva  Comfort bag delivered. Any other special requests or information regarding volunteer services: Three visits recorded for prayer and companionship. No further needs identified at this time. These notes have been discussed in 8 [facility] meeting. [address] [place] [address] Nurse Notes by Emanuel Rivers at 01/06/17 1234  Version 1 of 1    Author:  Emanuel Rivers Service:  Ledon Kocher Author Type:  Registered Nurse    Filed:  01/06/17 1237 Date of Service:  01/06/17 1234 Status:  Signed    :  Emanuel Rivers (Registered Nurse)           Patient: Myron Freeman    Date: 01/06/17  Time: 12:34 PM    [address] [place] [address] Nurse Notes    UPDATE: increased frequency of morphine required more for pain as opposed to dyspnea since the last IDG; fentanyl patch added and PRN morphine continues to be administered to manage pain and dyspnea; delirium presenting more requiring PRN haldol to manage    Goals of care: effectiveness of symptom management continuously evaluated to achieve optimum comfort      Signed by: Emanuel Rivers       [address] [place] [address]  Notes by Cha Arce at 01/06/17 1234  Version 1 of 1    Author:  Cha Arce Service:  Ledon Kocher Author Type:  Pastoral Care    Filed:  01/06/17 1235 Date of Service:  01/06/17 1234 Status:  Signed    :  Cha Arce (Pastoral Care)           Patient: Myron Freeman    Date: 01/06/17  Time: 12:34 PM    Patient admitted on 1/2/17.    Kari Johnson and Chris have both provided spiritual care for patient/family. Se documentation below for  Lisbet's most recent report:     provided a safe place for patient's daughter to tell their recent journey. As she began to open up  learned much more than what the patient had gone through. I learned that the family has had many deaths. Patient is one of 10 siblings. She and only one other are left. Patient's  is . Patient has a daughter who is . In addition to the stress of patient's grand son in law has a elizabeth tumor. Their most resent turn of events has been the patient's decline. Patients daughter has done all that she knew possible to help her mom improve since she fell in October of last year. The patient went to rehab but while there continued the downward spiral. Daughter was in hopes of getting her mom to a spinal doctor. 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 Her organs began to be compromised and she continued to decline until it was determined she needed comfort rather than curative care. Family is at peace with patient being at Hospice. They are assured of her eternal destination. During this visit  was able to listen and affirm daughters wonderful care, affirm her clifford and offer prayer for patient and family's journey including the grand son in law who has a brain tumor.      Following the visit  updated our Bereavement Coordinator, ARIANA Ash Div of the family's additional stressors.        Signed by: Chelsy MONET [facility] ANA PAULA  Notes by Judie Summers at 17 1231  Version 1 of 1    Author:  Judie Summers Service:  Stevie Heller Author Type:      Filed:  17 1233 Date of Service:  17 1231 Status:  Signed    :  Judie Summers ()           Patient: Nicki Velarde    Date: 17  Time: 12:31 PM    [facility]  Notes  Spoke to family about giving the \"LTC\" Bed  Up at [facility]. Family is ok with that. Daughter is feeling a little better with the decline of mom. Signed by: Catherine Molina       [facility] IDG  Notes by Catherine Molina at 01/03/17 1150  Version 1 of 1    Author:  Catherine Molina Service:  Jayashree Ken Author Type:      Filed:  01/03/17 1217 Date of Service:  01/03/17 1150 Status:  Signed    :  Catherine Molina ()           Patient: Sofia Clifton    Date: 01/03/17  Time: 11:50 AM    [facility]  Notes  LMSW will visit to complete the initial assessment. The family was curious if they needed to cancel the LTC bed at [facility]. LMSW advised the family to not give up a bed for long term just yet. The family will be calling the facility to check on the patient's belonging's.   She was getting rehab before going to the hospital.         Signed by: Catherine Molina       [facility] IDG Nurse Notes by Leilani Cueto at 01/03/17 1205  Version 1 of 1    Author:  Leilani Cueto Service:  Jayashree Ken Author Type:  Registered Nurse    Filed:  01/03/17 1211 Date of Service:  01/03/17 1205 Status:  Signed    :  Leilani Cueto (Registered Nurse)           Patient: Sofia Clifton    Date: 01/03/17  Time: 12:05 PM    [facility] Nurse Notes    1st IDG since GIP admission to [facility] yesterday; patient minimally responsive with the exception of yes/no questions and observations of talking with beings unable to be seen by staff about how she is \"ready to go to heaven\"; noted aspiration with oral intake despite thickened and crushed medications; patient also noted to be dyspneic with speech; receiving PRN haldol for agitation/delirium and PRN morphine for dyspnea - both alternating between crushed and injectables; SW assessment pending    Goals of care: effectiveness of symptom management continuously evaluated to achieve optimum comfort        Signed by: Jon MONET [facility] IDG  Notes by Wendie Jackman at 01/03/17 1038  Version 1 of 1    Author:  Wendie Jackman Service:  Spiritual Care Author Type:  Pastoral Care    Filed:  01/03/17 1200 Date of Service:  01/03/17 1038 Status:  Signed    :  Wendie Jackman (Pastoral Care)           Patient: Carmen Schneider    Date: 01/03/17  Time: 10:38 AM    [facility]  Notes    Spiritual Care assessment completed on January 2, 2017 by Candace Martinez. Patient was initially at [facility] and when she became ill was hospitalized with pneumonia and sepsis. She is now GIP level of care. Patient is of R.CELI Gallagher. During 's visit family shared a touching story of how patient led her daughter to [employer] is very important to this family. Family expressed feelings of peace with decisions for patient to be at [facility].  provide support with active listening, compassion and prayer. [employer] to follow up. Signed by: Wendie WILKINSONR [facility] IDG Volunteer Notes by Terrance Gu at 01/03/17 1124  Version 1 of 1    Author:  Terrance Gu Service:  Sudha Pryor Author Type:  Hospice Volunteer/    Filed:  01/03/17 1124 Date of Service:  01/03/17 1124 Status:  Signed    :  Terrance Gu (Hospice Volunteer/)               [facility] Interdisciplinary Plan of Care Review     Status Codes I = Initiated C=Continued R=Revised RS = Resolved     I Volunteer     Goal: Hospice house volunteer (s) enhances the quality of remaining life while patient is at the hospice house. Interventions: [place] Ramsey Langford Volunteer (s) will provide companionship to the patient and/or family by visiting at the hospice house       . Isela Langford Volunteer (s) will provide respite as needed when requested by patient and/or family. [place] Ramsey Soriano  Volunteer will provide activities such as music, reading, pet therapy, etc. as requested. Angeles Gearing Cortney Hose  Comfort bag delivered. Any other special requests or information regarding volunteer services:     No further needs identified at this time. These notes have been discussed in [facility] meeting.

## 2017-02-21 NOTE — PROGRESS NOTES
Pt was visualized throughout this shift and more than what could be documented. Pt appeared to rest very well today with minimal complaints. Olivier Bone remained at the bedside today. All needs met. Report given to Bhargavi.

## 2017-02-21 NOTE — PROGRESS NOTES
Ms. Owen Woodward in room 117 passed peacefully at 06:01 this morning with daughter, Minna Plaza, at bedside. Family is tearful, yet coping appropriately. Family expresses gratitude for staff and the excellent care Ms. Jeanine eHrnandez received while at the hospice house. Laurie Whitaker is service requested to assume care of body.

## 2020-06-13 NOTE — PROGRESS NOTES
Summary Note- Patient responsive to pain and answering questions appropriately this afternoon. Required PRN SQ medication for agitation/apin. Mouth care only. Prater to gravity. Patient safety maintained through hourly rounding: bed low and locked, side rails x2, tab alerts on, call light within reach, and door closed with family remaining at bedside. Dr Terrence BURRELL

## 2021-01-01 NOTE — [facility] IDG BEREAVEMENT NOTES
Patient death and family bereavement needs discussed at [facility]. Bereavement risk factors indicate a bereavement risk score of LOW . Bereavement follow up to be provided accordingly. no

## 2025-01-03 NOTE — PROGRESS NOTES
2nd attempt - left message to call back to patient voice mail and to Melani (DAVID).  See message below.   Summary Note- Patient is responsive to pain, yet very lethargic. Required SQ PRN medications for pain. Mouth care only. Prater to gravity; no bowel movement this shift. Family refused digital exam/bisacodyl suppository. Patient safety maintained through hourly rounding: bed low and locked, side rails x2, tab alerts on, call light within reach, and door open for continuous monitoring except when family is at bedside.